# Patient Record
Sex: FEMALE | Race: WHITE | NOT HISPANIC OR LATINO | Employment: FULL TIME | URBAN - METROPOLITAN AREA
[De-identification: names, ages, dates, MRNs, and addresses within clinical notes are randomized per-mention and may not be internally consistent; named-entity substitution may affect disease eponyms.]

---

## 2017-01-02 RX ORDER — HYDROCHLOROTHIAZIDE 25 MG/1
TABLET ORAL
Qty: 90 TAB | Refills: 0 | Status: SHIPPED | OUTPATIENT
Start: 2017-01-02 | End: 2017-04-01 | Stop reason: SDUPTHER

## 2017-01-02 NOTE — TELEPHONE ENCOUNTER
Refill done. Patient is due for annual appointment. Please have patient schedule.  Pedrito Siddiqui M.D.

## 2017-01-31 ENCOUNTER — APPOINTMENT (OUTPATIENT)
Dept: MEDICAL GROUP | Facility: MEDICAL CENTER | Age: 32
End: 2017-01-31
Payer: COMMERCIAL

## 2017-02-09 ENCOUNTER — APPOINTMENT (OUTPATIENT)
Dept: MEDICAL GROUP | Facility: MEDICAL CENTER | Age: 32
End: 2017-02-09
Payer: COMMERCIAL

## 2017-02-14 ENCOUNTER — TELEPHONE (OUTPATIENT)
Dept: NEUROLOGY | Facility: MEDICAL CENTER | Age: 32
End: 2017-02-14

## 2017-02-14 RX ORDER — HYDROCODONE BITARTRATE AND ACETAMINOPHEN 5; 300 MG/1; MG/1
TABLET ORAL
Qty: 30 TAB | Refills: 0 | Status: SHIPPED
Start: 2017-02-14 | End: 2017-02-14 | Stop reason: SDUPTHER

## 2017-02-14 RX ORDER — HYDROCODONE BITARTRATE AND ACETAMINOPHEN 5; 300 MG/1; MG/1
TABLET ORAL
Qty: 30 TAB | Refills: 0 | Status: SHIPPED | OUTPATIENT
Start: 2017-02-14 | End: 2017-03-20

## 2017-02-14 RX ORDER — SUMATRIPTAN 100 MG/1
TABLET, FILM COATED ORAL
Qty: 9 TAB | Refills: 5 | Status: SHIPPED | OUTPATIENT
Start: 2017-02-14 | End: 2017-03-20

## 2017-02-14 NOTE — TELEPHONE ENCOUNTER
Pt is sending this message via [employer] e-mail. Please advise. Thank you.    Prescription Question  Message [id number]     From   Nataliia Merida    To   DAT Robels    Sent   2/12/2017 10:50 AM        Shanell Mckeon I was wondering if I can get another script for Vicodin? Also if I can get a script for imitrex? I went and tried to    the script for relpax and it was $400 for 6 pills with insurance.

## 2017-02-16 ENCOUNTER — PATIENT MESSAGE (OUTPATIENT)
Dept: MEDICAL GROUP | Facility: MEDICAL CENTER | Age: 32
End: 2017-02-16

## 2017-02-16 RX ORDER — FLUOXETINE HYDROCHLORIDE 20 MG/1
20 CAPSULE ORAL DAILY
Qty: 90 CAP | Refills: 0 | Status: SHIPPED | OUTPATIENT
Start: 2017-02-16 | End: 2017-06-28 | Stop reason: SDUPTHER

## 2017-03-10 ENCOUNTER — OFFICE VISIT (OUTPATIENT)
Dept: MEDICAL GROUP | Facility: PHYSICIAN GROUP | Age: 32
End: 2017-03-10
Payer: COMMERCIAL

## 2017-03-10 VITALS
SYSTOLIC BLOOD PRESSURE: 122 MMHG | DIASTOLIC BLOOD PRESSURE: 90 MMHG | OXYGEN SATURATION: 97 % | TEMPERATURE: 98.2 F | WEIGHT: 260.2 LBS | HEIGHT: 67 IN | BODY MASS INDEX: 40.84 KG/M2 | RESPIRATION RATE: 16 BRPM | HEART RATE: 86 BPM

## 2017-03-10 DIAGNOSIS — E66.01 MORBID OBESITY WITH BMI OF 40.0-44.9, ADULT (HCC): Chronic | ICD-10-CM

## 2017-03-10 DIAGNOSIS — F33.0 MDD (MAJOR DEPRESSIVE DISORDER), RECURRENT EPISODE, MILD (HCC): Chronic | ICD-10-CM

## 2017-03-10 DIAGNOSIS — Z13.89 SCREENING FOR CARDIOVASCULAR, RESPIRATORY, AND GENITOURINARY DISEASES: ICD-10-CM

## 2017-03-10 DIAGNOSIS — Z13.6 SCREENING FOR CARDIOVASCULAR, RESPIRATORY, AND GENITOURINARY DISEASES: ICD-10-CM

## 2017-03-10 DIAGNOSIS — F41.9 ANXIETY: ICD-10-CM

## 2017-03-10 DIAGNOSIS — I10 ESSENTIAL HYPERTENSION: Chronic | ICD-10-CM

## 2017-03-10 DIAGNOSIS — Z13.21 ENCOUNTER FOR VITAMIN DEFICIENCY SCREENING: ICD-10-CM

## 2017-03-10 DIAGNOSIS — Z13.83 SCREENING FOR CARDIOVASCULAR, RESPIRATORY, AND GENITOURINARY DISEASES: ICD-10-CM

## 2017-03-10 PROCEDURE — 99214 OFFICE O/P EST MOD 30 MIN: CPT | Performed by: NURSE PRACTITIONER

## 2017-03-10 ASSESSMENT — PATIENT HEALTH QUESTIONNAIRE - PHQ9: CLINICAL INTERPRETATION OF PHQ2 SCORE: 0

## 2017-03-11 NOTE — ASSESSMENT & PLAN NOTE
Patient reports that she has daily migraine headaches and is currently treated with Topamax and sumatriptan. She also reports that she has an ongoing daily headache other than her migraines which is a constant aching pain in the temples and forehead starting 6 years ago. Patient had epidural anesthesia for childbirth 6 years ago. Her headaches improved when she is laying supine and worsen when she is upright. She may benefit from a lumbar spinal tap to determine CSF pressure for possible low spinal fluid headache. She is given a discussed this with Dr. Du neurologist.

## 2017-03-11 NOTE — ASSESSMENT & PLAN NOTE
Patient is morbidly obese with a BMI of 40.74. She admits to poor diet and also drinks soda. We discussed food triggers may be contributing to her headaches and possible food she can eliminate from her diet.

## 2017-03-11 NOTE — ASSESSMENT & PLAN NOTE
Chronic condition: Patient is treated for chronic anxiety with Prozac and xanax as needed once or twice a month. Denies any recent panic attacks, suicidal or homicidal ideation. Anxiety increased because younger brother was recently diagnosed with Huntingtons disease

## 2017-03-11 NOTE — PROGRESS NOTES
Chief Complaint   Patient presents with   • Establish Care   • Medication Refill     Xanax        HPI:    Nataliia Merida is a 31 y.o. female here to establish care and to discuss the following:    Anxiety  Chronic condition: Patient is treated for chronic anxiety with Prozac and xanax as needed once or twice a month. Denies any recent panic attacks, suicidal or homicidal ideation. Anxiety increased because younger brother was recently diagnosed with Huntingtons disease        Chronic migraine  Patient reports that she has daily migraine headaches and is currently treated with Topamax and sumatriptan. She also reports that she has an ongoing daily headache other than her migraines which is a constant aching pain in the temples and forehead starting 6 years ago. Patient had epidural anesthesia for childbirth 6 years ago. Her headaches improved when she is laying supine and worsen when she is upright. She may benefit from a lumbar spinal tap to determine CSF pressure for possible low spinal fluid headache. She is given a discussed this with Dr. Du neurologist.    HTN (hypertension)  Chronic condition: Patient has been treated for hypertension for 4 years.She  is currently taking hctz.  She denies any chest pain, diaphoresis, shortness of breath, headaches, dizziness or blurred vision.     MDD (major depressive disorder), recurrent episode, mild  Chronic Condition: Patient is currently taking Prozac for ongoing depression without any side effects. She denies agitation, insomnia, hallucinations, delusions, feelings of worthlessness,  loss of interest, suicidal or homicidal ideation.     Morbid obesity with BMI of 40.0-44.9, adult (HCC)  Patient is morbidly obese with a BMI of 40.74. She admits to poor diet and also drinks soda. We discussed food triggers may be contributing to her headaches and possible food she can eliminate from her diet.        Current medicines (including changes today)  Current Outpatient  "Prescriptions   Medication Sig Dispense Refill   • fluoxetine (PROZAC) 20 MG Cap Take 1 Cap by mouth every day. 90 Cap 0   • sumatriptan (IMITREX) 100 MG tablet Take 1/2-1 tablet po at onset of headache, may repeat dose in 2 hours if unrelieved.  Do not exceed more than 2 tablets in 24 hours. 9 Tab 5   • Hydrocodone-Acetaminophen (VICODIN) 5-300 MG Tab Take one tablet po Q6 hours prn migraine.  Do not exceed more than 2 tablets in 24 hours. 30 Tab 0   • hydrochlorothiazide (HYDRODIURIL) 25 MG Tab TAKE ONE TABLET BY MOUTH DAILY 90 Tab 0   • eletriptan (RELPAX) 40 MG tablet Take 1/2-1 tablet po at onset of headache, may repeat dose in 2 hours if unrelieved.  Do not exceed more than 2 tablets in 24 hours. 9 Tab 2   • topiramate (TOPAMAX) 50 MG tablet Take one tablet qam and 1.5 tablets po qhs X 1 week then one tablet po qam and two tablets po qhs thereafter. 90 Tab 5   • alprazolam (XANAX) 0.25 MG Tab Take 1 Tab by mouth 1 time daily as needed for Anxiety. 20 Tab 0   • benzonatate (TESSALON) 100 MG Cap Take 1 Cap by mouth 3 times a day as needed for Cough. 30 Cap 0   • hydrocod polst-chlorphen polst (TUSSIONEX PENNKINETIC ER) 10-8 MG/5ML Liquid CR Take 5 mL by mouth every 12 hours. 60 mL 0   • hydrocodone-acetaminophen (NORCO) 5-325 MG Tab per tablet Take 1-2 Tabs by mouth every four hours as needed. 30 Tab 0   • levonorgestrel-ethinyl estradiol (SEASONALE) 0.15-0.03 MG per tablet Take 1 Tab by mouth every day. (Patient not taking: Reported on 10/12/2016) 84 Tab 3     No current facility-administered medications for this visit.     She  has a past medical history of Tension headache; Abnormal Pap smear; Depression (10/19/2009); and Hypertension.  She  has no past surgical history on file.  Social History   Substance Use Topics   • Smoking status: Never Smoker    • Smokeless tobacco: Never Used      Comment: \"sometimes\"   • Alcohol Use: No     Social History     Social History Narrative     History reviewed. No " "pertinent family history.  Family Status   Relation Status Death Age   • Mother Alive    • Father Alive          ROS  Review of Systems   Constitutional: Negative for fever, chills, weight loss and malaise/fatigue.   HENT: Negative for ear pain, nosebleeds, congestion, sore throat and neck pain.    Eyes: Negative for blurred vision.   Respiratory: Negative for cough, sputum production, shortness of breath and wheezing.    Cardiovascular: Negative for chest pain, palpitations,  and leg swelling.   Gastrointestinal: Negative for heartburn, nausea, vomiting, diarrhea and abdominal pain.   Genitourinary: Negative for dysuria, urgency and frequency.   Musculoskeletal: Negative for myalgias, back pain and joint pain.   Skin: Negative for rash and itching.   Neurological: Negative for dizziness, tingling, tremors, sensory change, focal weakness. Positive for headaches  Endo/Heme/Allergies: Does not bruise/bleed easily.   Psychiatric/Behavioral: Negative for depression, anxiety, suicidal ideas, insomnia and memory loss. Positive for depression and anxiety    All other systems reviewed and are negative except as in HPI.       Objective:     Blood pressure 122/90, pulse 86, temperature 36.8 °C (98.2 °F), resp. rate 16, height 1.702 m (5' 7.01\"), weight 118.026 kg (260 lb 3.2 oz), last menstrual period 02/24/2017, SpO2 97 %, not currently breastfeeding. Body mass index is 40.74 kg/(m^2).  Physical Exam:  Constitutional: Alert, no distress.  Skin: Warm, dry, good turgor, no rashes in visible areas.  Eye: Equal, round and reactive, conjunctiva clear, lids normal.  ENMT: Lips without lesions, good dentition, oropharynx clear. Ear canals are clear, TMs within normal limits bilaterally.   Neck: Trachea midline, no masses, no thyromegaly. No cervical or supraclavicular lymphadenopathy.  Respiratory: Unlabored respiratory effort, lungs clear to auscultation, no wheezes, no ronchi.  Cardiovascular: Normal S1, S2, no murmur, no " edema.  Abdomen: Morbidly obese Soft, non-tender, no masses, no hepatosplenomegaly.  Psych: Alert and oriented x3, normal affect and mood.  MS: Ambulates independently with steady gait. Has full range of motion of all extremities and spine.  Neuro:  Cranial nerves intact. DTRs within normal limits. No neurological deficits.  Head is smooth and symmetrical without lacerations, hemotympanum, mastoid bruising, raccoon eyes, otorrhea or rhinorrhea. There is no  scalp tenderness or temporal tenderness. Pupils are equal and reactive, and funduscopic exam is within normal limits, without papilledema. There is no lacrimation, or ptosis. There is full range of motion of the temporomandibular joint without crepitus or tenderness. Patient has full range of motion of neck without nuchal rigidity. Cranial nerves II through XII are intact. There are no cerebellar deficits as indicated by alternating hands, finger to nose, and steady gait    Assessment and Plan:   The following treatment plan was discussed   1. Anxiety      Continue current medication   2. Chronic migraine      Follow-up with neurology   3. Essential hypertension      Continue current medication   4. MDD (major depressive disorder), recurrent episode, mild (CMS-HCC)      Continue current medication   5. Morbid obesity with BMI of 40.0-44.9, adult (CMS-HCC)  Patient identified as having weight management issue.  Appropriate orders and counseling given.    Discussed diet   6. Screening for cardiovascular, respiratory, and genitourinary diseases  COMP METABOLIC PANEL    LIPID PROFILE    Labs ordered   7. Encounter for vitamin deficiency screening  VITAMIN D,25 HYDROXY    Labs ordered     Followup: Return in about 3 months (around 6/10/2017) for anxiety, depression.   Please note that this dictation was created using voice recognition software. I have made every reasonable attempt to correct obvious errors, but I expect that there are errors of grammar and possibly  content that I did not discover before finalizing the note.

## 2017-03-11 NOTE — ASSESSMENT & PLAN NOTE
Chronic condition: Patient has been treated for hypertension for 4 years.She  is currently taking hctz.  She denies any chest pain, diaphoresis, shortness of breath, headaches, dizziness or blurred vision.

## 2017-03-11 NOTE — ASSESSMENT & PLAN NOTE
Chronic Condition: Patient is currently taking Prozac for ongoing depression without any side effects. She denies agitation, insomnia, hallucinations, delusions, feelings of worthlessness,  loss of interest, suicidal or homicidal ideation.

## 2017-03-13 ENCOUNTER — TELEPHONE (OUTPATIENT)
Dept: NEUROLOGY | Facility: MEDICAL CENTER | Age: 32
End: 2017-03-13

## 2017-03-13 DIAGNOSIS — G44.89 OTHER HEADACHE SYNDROME: ICD-10-CM

## 2017-03-13 NOTE — TELEPHONE ENCOUNTER
Pt is sending this message via [employer] e-mail. Please advise. Thank you.    Non-Urgent Medical Question  Message [id number]     From   Nataliia Merida    To   DAT Robles    Sent   3/10/2017  5:58 PM        Shanell Mckeon,     So I saw Gely Barnett today as my new PCP. We were discussing my headaches. She had asked if had any kids and if I had any epidurals. I told her I did almost 6 years ago. Which is when my headaches started. She mentioned something called low CSF headaches. She said we should look at maybe look at getting a spinal tap ordered to check the pressure. So I was wondering what your thoughts were and if we could look at getting that ordered? Thank you!

## 2017-03-14 NOTE — TELEPHONE ENCOUNTER
"We can order a \"blood patch\" procedure which will hopefully provide relief for her.  The order is placed and she should be hearing from her eventual radiology to set up the appointment.  "

## 2017-03-14 NOTE — TELEPHONE ENCOUNTER
Non-Urgent Medical Question        From     Nataliia Merida      To     Neurology Mas      Sent     3/14/2017 10:31 AM            Cheng oro. So my headaches start from the moment I get up from bed until I go to bed. If I lay down, I am ok. They are constant. If I get up and down a lot, it makes them worse. The only thing that helps is the imitrex because it makes me sleepy and then I can lay down and go to sleep. But I can only take that when I am not working. I feel constant throbbing pressure when I am awake.

## 2017-03-15 ENCOUNTER — HOSPITAL ENCOUNTER (OUTPATIENT)
Dept: RADIOLOGY | Facility: MEDICAL CENTER | Age: 32
End: 2017-03-15
Attending: NURSE PRACTITIONER
Payer: COMMERCIAL

## 2017-03-15 ENCOUNTER — TELEPHONE (OUTPATIENT)
Dept: NEUROLOGY | Facility: MEDICAL CENTER | Age: 32
End: 2017-03-15

## 2017-03-15 DIAGNOSIS — R51.0 POSITIONAL HEADACHE: ICD-10-CM

## 2017-03-15 PROCEDURE — 72156 MRI NECK SPINE W/O & W/DYE: CPT

## 2017-03-15 PROCEDURE — 72157 MRI CHEST SPINE W/O & W/DYE: CPT

## 2017-03-15 PROCEDURE — A9579 GAD-BASE MR CONTRAST NOS,1ML: HCPCS | Performed by: NURSE PRACTITIONER

## 2017-03-15 PROCEDURE — 700117 HCHG RX CONTRAST REV CODE 255: Performed by: NURSE PRACTITIONER

## 2017-03-15 PROCEDURE — 72158 MRI LUMBAR SPINE W/O & W/DYE: CPT

## 2017-03-15 RX ADMIN — GADODIAMIDE 20 ML: 287 INJECTION INTRAVENOUS at 20:28

## 2017-03-15 NOTE — TELEPHONE ENCOUNTER
Message: patient would like to proceed with the spine MRI Stat and poss blood patch    Caller: yusra  Call Back #: [phone]  OK to leave detailed message: n

## 2017-03-16 ENCOUNTER — TELEPHONE (OUTPATIENT)
Dept: NEUROLOGY | Facility: MEDICAL CENTER | Age: 32
End: 2017-03-16

## 2017-03-16 NOTE — TELEPHONE ENCOUNTER
Pt is sending this message via [employer] e-mail. Please advise. Thank you.    Test Result Question  Message [id number]     From   Nataliia Merida    To   DAT Robles    Sent   3/16/2017  1:45 PM        I was wondering if my MRI results were in?

## 2017-03-20 ENCOUNTER — OFFICE VISIT (OUTPATIENT)
Dept: NEUROLOGY | Facility: MEDICAL CENTER | Age: 32
End: 2017-03-20
Payer: COMMERCIAL

## 2017-03-20 VITALS
HEART RATE: 81 BPM | WEIGHT: 255 LBS | HEIGHT: 67 IN | DIASTOLIC BLOOD PRESSURE: 74 MMHG | BODY MASS INDEX: 40.02 KG/M2 | RESPIRATION RATE: 16 BRPM | SYSTOLIC BLOOD PRESSURE: 110 MMHG | TEMPERATURE: 97.2 F | OXYGEN SATURATION: 95 %

## 2017-03-20 PROCEDURE — 99214 OFFICE O/P EST MOD 30 MIN: CPT | Performed by: NURSE PRACTITIONER

## 2017-03-20 RX ORDER — KETOROLAC TROMETHAMINE 10 MG/1
10 TABLET, FILM COATED ORAL EVERY 6 HOURS PRN
Qty: 30 TAB | Refills: 2 | Status: SHIPPED | OUTPATIENT
Start: 2017-03-20 | End: 2018-01-11

## 2017-03-20 RX ORDER — TOPIRAMATE 25 MG/1
100 CAPSULE, EXTENDED RELEASE ORAL
Qty: 120 EACH | Refills: 5 | Status: SHIPPED | OUTPATIENT
Start: 2017-03-20 | End: 2018-01-11

## 2017-03-20 ASSESSMENT — ENCOUNTER SYMPTOMS
SORE THROAT: 0
WEIGHT LOSS: 1
DOUBLE VISION: 0
ABDOMINAL PAIN: 0
DIARRHEA: 0
DIZZINESS: 0
DEPRESSION: 0
NERVOUS/ANXIOUS: 0
HEADACHES: 1
SEIZURES: 0
VOMITING: 0
NAUSEA: 0
COUGH: 0
MUSCULOSKELETAL NEGATIVE: 1

## 2017-03-20 NOTE — PROGRESS NOTES
"Subjective:      Nataliia Merida is a 31 y.o. female who presents with Follow-Up for Chronic Migraine.        HPI   Headaches are constant.  \"They are just annoying\".  She has been considering that something is wrong and is frustrated that the headaches persist.    She was just evaluated by the PCP.    Recent MRI's of spine:  1. Small right paracentral disc protrusions at the T6-7 and T7-8 levels.  2. No evidence of arachnoid diverticula.    She will awaken with most of her headaches and fall asleep with them.  The pain intensity waxes and wanes.  Does not notice a wax/wane pattern with her headaches.  Does not notice a menstrual quality.    Finds that she likes taking the imitrex but it will make her fall asleep.    Current Outpatient Prescriptions   Medication Sig Dispense Refill   • fluoxetine (PROZAC) 20 MG Cap Take 1 Cap by mouth every day. 90 Cap 0   • hydrochlorothiazide (HYDRODIURIL) 25 MG Tab TAKE ONE TABLET BY MOUTH DAILY 90 Tab 0   • topiramate (TOPAMAX) 50 MG tablet Take one tablet qam and 1.5 tablets po qhs X 1 week then one tablet po qam and two tablets po qhs thereafter. 90 Tab 5   • sumatriptan (IMITREX) 100 MG tablet Take 1/2-1 tablet po at onset of headache, may repeat dose in 2 hours if unrelieved.  Do not exceed more than 2 tablets in 24 hours. 9 Tab 5   • Hydrocodone-Acetaminophen (VICODIN) 5-300 MG Tab Take one tablet po Q6 hours prn migraine.  Do not exceed more than 2 tablets in 24 hours. (Patient not taking: Reported on 3/20/2017) 30 Tab 0   • eletriptan (RELPAX) 40 MG tablet Take 1/2-1 tablet po at onset of headache, may repeat dose in 2 hours if unrelieved.  Do not exceed more than 2 tablets in 24 hours. (Patient not taking: Reported on 3/20/2017) 9 Tab 2   • alprazolam (XANAX) 0.25 MG Tab Take 1 Tab by mouth 1 time daily as needed for Anxiety. 20 Tab 0   • benzonatate (TESSALON) 100 MG Cap Take 1 Cap by mouth 3 times a day as needed for Cough. (Patient not taking: Reported on " "3/20/2017) 30 Cap 0   • hydrocod polst-chlorphen polst (TUSSIONEX PENNKINETIC ER) 10-8 MG/5ML Liquid CR Take 5 mL by mouth every 12 hours. (Patient not taking: Reported on 3/20/2017) 60 mL 0   • hydrocodone-acetaminophen (NORCO) 5-325 MG Tab per tablet Take 1-2 Tabs by mouth every four hours as needed. 30 Tab 0   • levonorgestrel-ethinyl estradiol (SEASONALE) 0.15-0.03 MG per tablet Take 1 Tab by mouth every day. (Patient not taking: Reported on 10/12/2016) 84 Tab 3     No current facility-administered medications for this visit.         Review of Systems   Constitutional: Positive for weight loss.   HENT: Negative for hearing loss, nosebleeds and sore throat.         No recent head injury.   Eyes: Negative for double vision.        No new loss of vision.   Respiratory: Negative for cough.         No recent lung infections.   Cardiovascular: Negative for chest pain.   Gastrointestinal: Negative for nausea, vomiting, abdominal pain and diarrhea.   Genitourinary: Negative.    Musculoskeletal: Negative.    Skin: Negative.    Neurological: Positive for headaches. Negative for dizziness and seizures.   Endo/Heme/Allergies:        No history of endocrine dysfunction.  No new problems.   Psychiatric/Behavioral: Negative for depression. The patient is not nervous/anxious.         No recent mood changes.          Objective:     /74 mmHg  Pulse 81  Temp(Src) 36.2 °C (97.2 °F)  Resp 16  Ht 1.702 m (5' 7.01\")  Wt 115.667 kg (255 lb)  BMI 39.93 kg/m2  SpO2 95%  LMP 02/24/2017     Physical Exam   Constitutional: She is oriented to person, place, and time. She appears well-developed and well-nourished.   HENT:   Head: Normocephalic and atraumatic.   Eyes: EOM are normal.   Neck: Normal range of motion.   Cardiovascular: Normal rate and regular rhythm.    Pulmonary/Chest: Effort normal.   Neurological: She is alert and oriented to person, place, and time. She exhibits normal muscle tone. Gait normal.   No observable " changes in neurologic status.  See initial new patient examination for details.    Skin: Skin is warm.   Psychiatric: She has a normal mood and affect.   Flat affect           Assessment/Plan:     Chronic Migraine:  Headaches have not been well managed.  She is complaining of nearly a daily headache and will awaken with many of them.    She has many abortive prescription options available however prefers to use Exedrin migraine.  Continue to use Maxalt MLT's but does feel sleepy when taking it.    Counseled regarding anxiety and depression and activity level.  I would recommend a sleep study in the future.    Obtain labs as ordered.    Taper off of HTCZ-- she has a home BP monitoring kit.    Change TXP 50mg qhs to Qudexy 25mg capsules with titration to 100mg qhs ideally.    Encouraged to be active, exercise, eat healthy and routinely.  Recommend starting magnesium everyday.    We reviewed her spine MRI's at length.  It is decided at this time to not proceed with a blood patch.    Return for follow-up in 3 months.   I spent 35+ minutes with this patient, over fifty percent was spent counseling patient on their condition, best management practices, reviewing test results and risks and benefits of treatment.

## 2017-03-20 NOTE — PATIENT INSTRUCTIONS
Qudexy 25mg tablets:      Bedtime:  2 tablets X 1 week  3 tablets X 1 week  4 tablets therafter    FINDINGS:  The thoracic vertebral bodies have a normal height and alignment. The intervertebral disc have moderate narrowing at the T6-7 and T7-8 levelsa. The thoracic cord has a normal caliber course and signal intensity. There is no evidence of abnormal   enhancement in the thoracic spinal cord.    There is no evidence of central canal stenosis, or neural foraminal stenosis. There is small right paracentral disc protrusions at the T6-7 and more prominently at the T7-8 level.         Impression          1. Small right paracentral disc protrusions at the T6-7 and T7-8 levels.    2. No evidence of arachnoid diverticula.

## 2017-04-29 ENCOUNTER — HOSPITAL ENCOUNTER (OUTPATIENT)
Dept: LAB | Facility: MEDICAL CENTER | Age: 32
End: 2017-04-29
Attending: NURSE PRACTITIONER
Payer: COMMERCIAL

## 2017-04-29 DIAGNOSIS — Z13.21 ENCOUNTER FOR VITAMIN DEFICIENCY SCREENING: ICD-10-CM

## 2017-04-29 DIAGNOSIS — Z13.83 SCREENING FOR CARDIOVASCULAR, RESPIRATORY, AND GENITOURINARY DISEASES: ICD-10-CM

## 2017-04-29 DIAGNOSIS — Z13.89 SCREENING FOR CARDIOVASCULAR, RESPIRATORY, AND GENITOURINARY DISEASES: ICD-10-CM

## 2017-04-29 DIAGNOSIS — Z13.6 SCREENING FOR CARDIOVASCULAR, RESPIRATORY, AND GENITOURINARY DISEASES: ICD-10-CM

## 2017-04-29 LAB
25(OH)D3 SERPL-MCNC: 10 NG/ML (ref 30–100)
25(OH)D3 SERPL-MCNC: 11 NG/ML (ref 30–100)
ALBUMIN SERPL BCP-MCNC: 4.1 G/DL (ref 3.2–4.9)
ALBUMIN SERPL BCP-MCNC: 4.1 G/DL (ref 3.2–4.9)
ALBUMIN/GLOB SERPL: 1.2 G/DL
ALBUMIN/GLOB SERPL: 1.2 G/DL
ALP SERPL-CCNC: 114 U/L (ref 30–99)
ALP SERPL-CCNC: 114 U/L (ref 30–99)
ALT SERPL-CCNC: 15 U/L (ref 2–50)
ALT SERPL-CCNC: 16 U/L (ref 2–50)
ANION GAP SERPL CALC-SCNC: 8 MMOL/L (ref 0–11.9)
ANION GAP SERPL CALC-SCNC: 9 MMOL/L (ref 0–11.9)
AST SERPL-CCNC: 13 U/L (ref 12–45)
AST SERPL-CCNC: 14 U/L (ref 12–45)
BASOPHILS # BLD AUTO: 0.7 % (ref 0–1.8)
BASOPHILS # BLD: 0.05 K/UL (ref 0–0.12)
BILIRUB SERPL-MCNC: 0.5 MG/DL (ref 0.1–1.5)
BILIRUB SERPL-MCNC: 0.5 MG/DL (ref 0.1–1.5)
BUN SERPL-MCNC: 15 MG/DL (ref 8–22)
BUN SERPL-MCNC: 15 MG/DL (ref 8–22)
CALCIUM SERPL-MCNC: 9 MG/DL (ref 8.5–10.5)
CALCIUM SERPL-MCNC: 9 MG/DL (ref 8.5–10.5)
CHLORIDE SERPL-SCNC: 109 MMOL/L (ref 96–112)
CHLORIDE SERPL-SCNC: 110 MMOL/L (ref 96–112)
CHOLEST SERPL-MCNC: 185 MG/DL (ref 100–199)
CO2 SERPL-SCNC: 20 MMOL/L (ref 20–33)
CO2 SERPL-SCNC: 20 MMOL/L (ref 20–33)
CREAT SERPL-MCNC: 0.71 MG/DL (ref 0.5–1.4)
CREAT SERPL-MCNC: 0.72 MG/DL (ref 0.5–1.4)
EOSINOPHIL # BLD AUTO: 0.05 K/UL (ref 0–0.51)
EOSINOPHIL NFR BLD: 0.7 % (ref 0–6.9)
ERYTHROCYTE [DISTWIDTH] IN BLOOD BY AUTOMATED COUNT: 44.8 FL (ref 35.9–50)
GFR SERPL CREATININE-BSD FRML MDRD: >60 ML/MIN/1.73 M 2
GFR SERPL CREATININE-BSD FRML MDRD: >60 ML/MIN/1.73 M 2
GLOBULIN SER CALC-MCNC: 3.3 G/DL (ref 1.9–3.5)
GLOBULIN SER CALC-MCNC: 3.4 G/DL (ref 1.9–3.5)
GLUCOSE SERPL-MCNC: 92 MG/DL (ref 65–99)
GLUCOSE SERPL-MCNC: 93 MG/DL (ref 65–99)
HCT VFR BLD AUTO: 44.7 % (ref 37–47)
HDLC SERPL-MCNC: 47 MG/DL
HGB BLD-MCNC: 14.2 G/DL (ref 12–16)
IMM GRANULOCYTES # BLD AUTO: 0.02 K/UL (ref 0–0.11)
IMM GRANULOCYTES NFR BLD AUTO: 0.3 % (ref 0–0.9)
LDLC SERPL CALC-MCNC: 118 MG/DL
LYMPHOCYTES # BLD AUTO: 2.62 K/UL (ref 1–4.8)
LYMPHOCYTES NFR BLD: 36.8 % (ref 22–41)
MCH RBC QN AUTO: 28.4 PG (ref 27–33)
MCHC RBC AUTO-ENTMCNC: 31.8 G/DL (ref 33.6–35)
MCV RBC AUTO: 89.4 FL (ref 81.4–97.8)
MONOCYTES # BLD AUTO: 0.37 K/UL (ref 0–0.85)
MONOCYTES NFR BLD AUTO: 5.2 % (ref 0–13.4)
NEUTROPHILS # BLD AUTO: 4 K/UL (ref 2–7.15)
NEUTROPHILS NFR BLD: 56.3 % (ref 44–72)
NRBC # BLD AUTO: 0 K/UL
NRBC BLD AUTO-RTO: 0 /100 WBC
PLATELET # BLD AUTO: 310 K/UL (ref 164–446)
PMV BLD AUTO: 11.5 FL (ref 9–12.9)
POTASSIUM SERPL-SCNC: 3.8 MMOL/L (ref 3.6–5.5)
POTASSIUM SERPL-SCNC: 3.8 MMOL/L (ref 3.6–5.5)
PROT SERPL-MCNC: 7.4 G/DL (ref 6–8.2)
PROT SERPL-MCNC: 7.5 G/DL (ref 6–8.2)
RBC # BLD AUTO: 5 M/UL (ref 4.2–5.4)
SODIUM SERPL-SCNC: 138 MMOL/L (ref 135–145)
SODIUM SERPL-SCNC: 138 MMOL/L (ref 135–145)
T4 FREE SERPL-MCNC: 0.65 NG/DL (ref 0.53–1.43)
TRIGL SERPL-MCNC: 101 MG/DL (ref 0–149)
TSH SERPL DL<=0.005 MIU/L-ACNC: 1.36 UIU/ML (ref 0.3–3.7)
VIT B12 SERPL-MCNC: 400 PG/ML (ref 211–911)
WBC # BLD AUTO: 7.1 K/UL (ref 4.8–10.8)

## 2017-04-29 PROCEDURE — 82306 VITAMIN D 25 HYDROXY: CPT | Mod: 91

## 2017-04-29 PROCEDURE — 80053 COMPREHEN METABOLIC PANEL: CPT

## 2017-04-29 PROCEDURE — 80053 COMPREHEN METABOLIC PANEL: CPT | Mod: 91

## 2017-04-29 PROCEDURE — 84443 ASSAY THYROID STIM HORMONE: CPT

## 2017-04-29 PROCEDURE — 82607 VITAMIN B-12: CPT

## 2017-04-29 PROCEDURE — 85025 COMPLETE CBC W/AUTO DIFF WBC: CPT

## 2017-04-29 PROCEDURE — 82306 VITAMIN D 25 HYDROXY: CPT

## 2017-04-29 PROCEDURE — 80061 LIPID PANEL: CPT

## 2017-04-29 PROCEDURE — 84439 ASSAY OF FREE THYROXINE: CPT

## 2017-04-29 PROCEDURE — 36415 COLL VENOUS BLD VENIPUNCTURE: CPT

## 2017-05-01 ENCOUNTER — TELEPHONE (OUTPATIENT)
Dept: NEUROLOGY | Facility: MEDICAL CENTER | Age: 32
End: 2017-05-01

## 2017-05-01 NOTE — TELEPHONE ENCOUNTER
Please let Nataliia know that out of all her labs the only concern is a low Vit D level-- needs to be taking at least Vit D3 2000IU per day.

## 2017-05-03 DIAGNOSIS — E55.9 VITAMIN D DEFICIENCY: ICD-10-CM

## 2017-05-03 RX ORDER — ERGOCALCIFEROL 1.25 MG/1
50000 CAPSULE ORAL
Qty: 30 CAP | Refills: 0 | Status: SHIPPED | OUTPATIENT
Start: 2017-05-03 | End: 2018-01-11

## 2017-06-26 RX ORDER — FLUOXETINE HYDROCHLORIDE 20 MG/1
20 CAPSULE ORAL DAILY
Qty: 90 CAP | Refills: 0 | Status: CANCELLED | OUTPATIENT
Start: 2017-06-26

## 2017-06-28 DIAGNOSIS — F33.0 MDD (MAJOR DEPRESSIVE DISORDER), RECURRENT EPISODE, MILD (HCC): ICD-10-CM

## 2017-06-28 RX ORDER — FLUOXETINE HYDROCHLORIDE 20 MG/1
20 CAPSULE ORAL DAILY
Qty: 90 CAP | Refills: 0 | Status: SHIPPED | OUTPATIENT
Start: 2017-06-28 | End: 2017-09-27 | Stop reason: SDUPTHER

## 2017-07-12 ENCOUNTER — TELEPHONE (OUTPATIENT)
Dept: NEUROLOGY | Facility: MEDICAL CENTER | Age: 32
End: 2017-07-12

## 2017-07-12 RX ORDER — TOPIRAMATE 50 MG/1
50 CAPSULE, EXTENDED RELEASE ORAL EVERY MORNING
Qty: 90 EACH | Refills: 5 | Status: SHIPPED | OUTPATIENT
Start: 2017-07-12 | End: 2017-07-31 | Stop reason: SDUPTHER

## 2017-07-12 NOTE — TELEPHONE ENCOUNTER
Patient is currently taking 50mg in the morning and 100mg at night. She is willing to try the Quedexy, she reports that last time even with a discount card it was still $300, as the discount card only covered $100 of the medication.

## 2017-07-12 NOTE — TELEPHONE ENCOUNTER
Please let Nataliia know that at the pharmacy they need to run the Quedexy generic and name brand.  One will be preferred over the other meaning have a low to none co-pay.

## 2017-07-12 NOTE — TELEPHONE ENCOUNTER
Can you please call to see what her current dose of TPX is so I can prescribe the same dose in Quedexy?

## 2017-07-17 ENCOUNTER — TELEPHONE (OUTPATIENT)
Dept: NEUROLOGY | Facility: MEDICAL CENTER | Age: 32
End: 2017-07-17

## 2017-07-17 DIAGNOSIS — F33.0 MDD (MAJOR DEPRESSIVE DISORDER), RECURRENT EPISODE, MILD (HCC): ICD-10-CM

## 2017-07-17 RX ORDER — ALPRAZOLAM 0.25 MG/1
TABLET ORAL
Qty: 20 TAB | Refills: 0 | Status: SHIPPED | OUTPATIENT
Start: 2017-07-17 | End: 2017-12-05 | Stop reason: SDUPTHER

## 2017-07-17 NOTE — TELEPHONE ENCOUNTER
Patient's pharmacy is asking for:    Alprazolam 0.25mg Tab  Sig: Take one tablet po PRN for anxiety        Was the patient seen in the last year in this department? Yes  3/20/17    Does patient have an active prescription for medications requested? Yes     Received Request Via: Pharmacy    No follow up appointment at this time.

## 2017-07-31 ENCOUNTER — TELEPHONE (OUTPATIENT)
Dept: NEUROLOGY | Facility: MEDICAL CENTER | Age: 32
End: 2017-07-31

## 2017-07-31 RX ORDER — TOPIRAMATE 50 MG/1
50 CAPSULE, EXTENDED RELEASE ORAL EVERY MORNING
Qty: 90 EACH | Refills: 5 | Status: SHIPPED
Start: 2017-07-31 | End: 2018-07-02

## 2017-07-31 NOTE — TELEPHONE ENCOUNTER
Called and spoke with patient to let her know that [employer] was trying to reach out to her about patient assistance for her medication. She stated that she will call them in the morning. Could you please reprint a script for topiramate Er so that I can send it into their pharmacy?

## 2017-08-16 ENCOUNTER — HOSPITAL ENCOUNTER (OUTPATIENT)
Facility: MEDICAL CENTER | Age: 32
End: 2017-08-16
Payer: COMMERCIAL

## 2017-08-16 LAB
BDY FAT % MEASURED: 45.4 %
BP DIAS: 88 MMHG
BP SYS: 132 MMHG
CHOLEST SERPL-MCNC: 185 MG/DL (ref 100–199)
DIABETES HTDIA: NO
EVENT NAME HTEVT: NORMAL
FASTING HTFAS: YES
GLUCOSE SERPL-MCNC: 88 MG/DL (ref 65–99)
HDLC SERPL-MCNC: 44 MG/DL
HYPERTENSION HTHYP: YES
LDLC SERPL CALC-MCNC: 125 MG/DL
SCREENING LOC CITY HTCIT: NORMAL
SCREENING LOC STATE HTSTA: NORMAL
SCREENING LOCATION HTLOC: NORMAL
SMOKING HTSMO: NO
SUBSCRIBER ID HTSID: NORMAL
TRIGL SERPL-MCNC: 79 MG/DL (ref 0–149)

## 2017-08-16 PROCEDURE — 82947 ASSAY GLUCOSE BLOOD QUANT: CPT

## 2017-08-16 PROCEDURE — 80061 LIPID PANEL: CPT

## 2017-08-16 PROCEDURE — S5190 WELLNESS ASSESSMENT BY NONPH: HCPCS

## 2017-09-27 DIAGNOSIS — F33.0 MDD (MAJOR DEPRESSIVE DISORDER), RECURRENT EPISODE, MILD (HCC): ICD-10-CM

## 2017-09-28 RX ORDER — FLUOXETINE HYDROCHLORIDE 20 MG/1
CAPSULE ORAL
Qty: 90 CAP | Refills: 0 | Status: SHIPPED | OUTPATIENT
Start: 2017-09-28 | End: 2017-09-29 | Stop reason: SDUPTHER

## 2017-09-29 ENCOUNTER — OFFICE VISIT (OUTPATIENT)
Dept: MEDICAL GROUP | Facility: PHYSICIAN GROUP | Age: 32
End: 2017-09-29
Payer: COMMERCIAL

## 2017-09-29 VITALS
WEIGHT: 255 LBS | OXYGEN SATURATION: 98 % | HEIGHT: 67 IN | TEMPERATURE: 98.1 F | DIASTOLIC BLOOD PRESSURE: 86 MMHG | HEART RATE: 88 BPM | BODY MASS INDEX: 40.02 KG/M2 | SYSTOLIC BLOOD PRESSURE: 122 MMHG | RESPIRATION RATE: 14 BRPM

## 2017-09-29 DIAGNOSIS — G43.911 INTRACTABLE MIGRAINE WITH STATUS MIGRAINOSUS, UNSPECIFIED MIGRAINE TYPE: ICD-10-CM

## 2017-09-29 DIAGNOSIS — F33.0 MDD (MAJOR DEPRESSIVE DISORDER), RECURRENT EPISODE, MILD (HCC): ICD-10-CM

## 2017-09-29 DIAGNOSIS — Z23 NEED FOR VACCINATION: ICD-10-CM

## 2017-09-29 DIAGNOSIS — F41.9 ANXIETY: ICD-10-CM

## 2017-09-29 PROCEDURE — 99214 OFFICE O/P EST MOD 30 MIN: CPT | Mod: 25 | Performed by: NURSE PRACTITIONER

## 2017-09-29 PROCEDURE — 90471 IMMUNIZATION ADMIN: CPT | Performed by: NURSE PRACTITIONER

## 2017-09-29 PROCEDURE — 90686 IIV4 VACC NO PRSV 0.5 ML IM: CPT | Performed by: NURSE PRACTITIONER

## 2017-09-29 RX ORDER — FLUOXETINE HYDROCHLORIDE 20 MG/1
20 CAPSULE ORAL DAILY
Qty: 90 CAP | Refills: 0 | Status: SHIPPED | OUTPATIENT
Start: 2017-09-29 | End: 2018-01-02

## 2017-10-06 NOTE — ASSESSMENT & PLAN NOTE
Chronic condition: Patient is treated for chronic anxiety with Prozac and Xanax as needed once or twice a month She denies any recent panic attacks, suicidal or homicidal ideation. She is requesting refill on Prozac today

## 2017-10-06 NOTE — PROGRESS NOTES
Subjective:     Chief Complaint   Patient presents with   • Medication Management     Prozac    • Immunizations     Flu        HPI:  Nataliia Merida is a 32 y.o. female here today to discuss the following:    Anxiety  Chronic condition: Patient is treated for chronic anxiety with Prozac and Xanax as needed once or twice a month She denies any recent panic attacks, suicidal or homicidal ideation. She is requesting refill on Prozac today    MDD (major depressive disorder), recurrent episode, mild  Depression is currently controlled with Prozac.    Chronic migraine  Migraine headaches are currently controlled with Topamax and managed by neurology         Current medicines (including changes today)  Current Outpatient Prescriptions   Medication Sig Dispense Refill   • fluoxetine (PROZAC) 20 MG Cap Take 1 Cap by mouth every day. 90 Cap 0   • vitamin D, Ergocalciferol, (DRISDOL) 14431 UNITS Cap capsule Take 1 Cap by mouth every 7 days. 30 Cap 0   • topiramate (TOPAMAX) 50 MG tablet Take one tablet qam and 1.5 tablets po qhs X 1 week then one tablet po qam and two tablets po qhs thereafter. 90 Tab 5   • Topiramate ER (QUDEXY XR) 50 MG Capsule ER 24 Hour Sprinkle Take 50 mg by mouth every morning. Take 100 mg by mouth every night. 90 Each 5   • alprazolam (XANAX) 0.25 MG Tab Take one tablet po Q6 hours prn anxiety. 20 Tab 0   • Topiramate ER (QUDEXY XR) 25 MG Capsule ER 24 Hour Sprinkle Take 100 mg by mouth every bedtime. 120 Each 5   • ketorolac (TORADOL) 10 MG Tab Take 1 Tab by mouth every 6 hours as needed. 30 Tab 2     No current facility-administered medications for this visit.        She  has a past medical history of Abnormal Pap smear; Depression (10/19/2009); Hypertension; and Tension headache.    ROS   Review of Systems   Constitutional: Negative for fever, chills, weight loss and malaise/fatigue.   HENT: Negative for ear pain, nosebleeds, congestion, sore throat and neck pain.    Respiratory: Negative for  "cough, sputum production, shortness of breath and wheezing.    Cardiovascular: Negative for chest pain, palpitations,  and leg swelling.   Gastrointestinal: Negative for heartburn, nausea, vomiting, diarrhea and abdominal pain.   Neurological: Negative for dizziness, tingling, tremors, sensory change, focal weakness and Positive for headaches.   Psychiatric/Behavioral: Positive for depression And anxiety. Negative for suicidal ideas, insomnia and memory loss.    All other systems reviewed and are negative except as in HPI.     Objective:   Physical Exam:  Blood pressure 122/86, pulse 88, temperature 36.7 °C (98.1 °F), resp. rate 14, height 1.702 m (5' 7.01\"), weight 115.7 kg (255 lb), last menstrual period 09/15/2017, SpO2 98 %, not currently breastfeeding. Body mass index is 39.93 kg/m².   Physical Exam:  Alert, oriented in no acute distress.  Eye contact is good, speech goal directed, affect calm  HEENT: conjunctiva non-injected, sclera non-icteric.  Pinna normal.   Neck No adenopathy or masses in the neck or supraclavicular regions.  Lungs: clear to auscultation bilaterally with good excursion.  CV: regular rate and rhythm.   Ext: no edema, color normal, vascularity normal, temperature normal  MS: Normal gait and station    Assessment and Plan:   The following treatment plan was discussed   1. Need for vaccination  Flu Quad Inj >3 Year Pre-Filled (Preservative Free)   2. MDD (major depressive disorder), recurrent episode, mild (CMS-HCC)  fluoxetine (PROZAC) 20 MG Cap   3. Intractable migraine with status migrainosus, unspecified migraine type     4. Anxiety     5. Chronic migraine       I have placed the below orders and discussed them with an approved delegating provider. The MA is performing the below orders under the direction of Dr. Dave, who have provided verbal consent for supervision.  Followup: Return in about 6 months (around 3/29/2018), or if symptoms worsen or fail to improve, for depression, anxiety. "   Please note that this dictation was created using voice recognition software. I have made every reasonable attempt to correct obvious errors, but I expect that there are errors of grammar and possibly content that I did not discover before finalizing the note.

## 2017-12-05 DIAGNOSIS — F33.0 MDD (MAJOR DEPRESSIVE DISORDER), RECURRENT EPISODE, MILD (HCC): ICD-10-CM

## 2017-12-05 RX ORDER — ALPRAZOLAM 0.25 MG/1
TABLET ORAL
Qty: 20 TAB | Refills: 0 | Status: SHIPPED
Start: 2017-12-05 | End: 2018-01-17 | Stop reason: SDUPTHER

## 2017-12-05 NOTE — TELEPHONE ENCOUNTER
Was the patient seen in the last year in this department? Yes  3/20/17    Does patient have an active prescription for medications requested? Yes     Received Request Via: Pharmacy    No scheduled follow up appointment at this time.

## 2017-12-31 DIAGNOSIS — F33.0 MDD (MAJOR DEPRESSIVE DISORDER), RECURRENT EPISODE, MILD (HCC): ICD-10-CM

## 2018-01-02 RX ORDER — FLUOXETINE HYDROCHLORIDE 20 MG/1
CAPSULE ORAL
Qty: 90 CAP | Refills: 1 | Status: SHIPPED | OUTPATIENT
Start: 2018-01-02 | End: 2018-07-02 | Stop reason: SDUPTHER

## 2018-01-02 NOTE — TELEPHONE ENCOUNTER
Was the patient seen in the last year in this department? Yes     Does patient have an active prescription for medications requested? No     Received Request Via: Pharmacy      Pt met protocol?: Yes, OV 9/17

## 2018-01-11 ENCOUNTER — OFFICE VISIT (OUTPATIENT)
Dept: NEUROLOGY | Facility: MEDICAL CENTER | Age: 33
End: 2018-01-11
Payer: COMMERCIAL

## 2018-01-11 VITALS
HEART RATE: 80 BPM | OXYGEN SATURATION: 98 % | DIASTOLIC BLOOD PRESSURE: 70 MMHG | SYSTOLIC BLOOD PRESSURE: 118 MMHG | HEIGHT: 67 IN | RESPIRATION RATE: 16 BRPM | TEMPERATURE: 98.6 F

## 2018-01-11 DIAGNOSIS — F41.9 ANXIETY: ICD-10-CM

## 2018-01-11 PROCEDURE — 99214 OFFICE O/P EST MOD 30 MIN: CPT | Performed by: NURSE PRACTITIONER

## 2018-01-11 RX ORDER — ZOLMITRIPTAN 5 MG/1
TABLET, ORALLY DISINTEGRATING ORAL
Qty: 9 TAB | Refills: 5 | Status: SHIPPED | OUTPATIENT
Start: 2018-01-11 | End: 2021-01-07

## 2018-01-11 ASSESSMENT — ENCOUNTER SYMPTOMS
VOMITING: 0
SORE THROAT: 0
MUSCULOSKELETAL NEGATIVE: 1
DEPRESSION: 0
DOUBLE VISION: 0
ABDOMINAL PAIN: 0
HEADACHES: 1
NERVOUS/ANXIOUS: 1
DIARRHEA: 0
CONSTITUTIONAL NEGATIVE: 1
NAUSEA: 0
SEIZURES: 0
COUGH: 0

## 2018-01-11 NOTE — PROGRESS NOTES
Subjective:      Nataliia Merida is a 32 y.o. female who presents with Follow-Up (chronic migraine)            HPI  Seen today for migraine management.    Wakes-up with a migraine almost every day.  If she wakes-up in the early morning hour, then her migraine will be really bad when she finally awakens 1-2 hours later.  Sometimes she is needing to stay home and start work a few hours later.  Other times she is calling in to work the entire day.    May not drink as much water as she should.    Abortive treatment is imitrex but is not very effective.    Did have a normal at-home sleep study in 2014.    Emotionally she struggles with anxiety but feels that it is controlled at this time.    Current Outpatient Prescriptions   Medication Sig Dispense Refill   • fluoxetine (PROZAC) 20 MG Cap TAKE ONE CAPSULE BY MOUTH DAILY 90 Cap 1   • Topiramate ER (QUDEXY XR) 50 MG Capsule ER 24 Hour Sprinkle Take 50 mg by mouth every morning. Take 100 mg by mouth every night. 90 Each 5   • topiramate (TOPAMAX) 50 MG tablet Take one tablet qam and 1.5 tablets po qhs X 1 week then one tablet po qam and two tablets po qhs thereafter. 90 Tab 5   • alprazolam (XANAX) 0.25 MG Tab Take one tablet po Q6 hours prn anxiety. 20 Tab 0   • vitamin D, Ergocalciferol, (DRISDOL) 01105 UNITS Cap capsule Take 1 Cap by mouth every 7 days. (Patient not taking: Reported on 1/11/2018) 30 Cap 0   • Topiramate ER (QUDEXY XR) 25 MG Capsule ER 24 Hour Sprinkle Take 100 mg by mouth every bedtime. (Patient not taking: Reported on 1/11/2018) 120 Each 5   • ketorolac (TORADOL) 10 MG Tab Take 1 Tab by mouth every 6 hours as needed. 30 Tab 2     No current facility-administered medications for this visit.          Review of Systems   Constitutional: Negative.    HENT: Negative for hearing loss, nosebleeds and sore throat.         No recent head injury.   Eyes: Negative for double vision.        No new loss of vision.   Respiratory: Negative for cough.         No  "recent lung infections.   Cardiovascular: Negative for chest pain.   Gastrointestinal: Negative for abdominal pain, diarrhea, nausea and vomiting.   Genitourinary: Negative.    Musculoskeletal: Negative.    Skin: Negative.    Neurological: Positive for headaches. Negative for seizures.   Endo/Heme/Allergies:        No history of endocrine dysfunction.  No new problems.   Psychiatric/Behavioral: Negative for depression. The patient is nervous/anxious.         No recent mood changes.          Objective:     /70 Comment: 118/70  Pulse 80   Temp 37 °C (98.6 °F)   Resp 16   Ht 1.702 m (5' 7\")   SpO2 98%      Physical Exam   Constitutional: She is oriented to person, place, and time. She appears well-developed and well-nourished.   HENT:   Head: Normocephalic and atraumatic.   Eyes: EOM are normal.   Neck: Normal range of motion.   Cardiovascular: Normal rate and regular rhythm.    Pulmonary/Chest: Effort normal.   Neurological: She is alert and oriented to person, place, and time. She exhibits normal muscle tone. Gait normal.   No observable changes in neurologic status.  See initial new patient examination for details.    Skin: Skin is warm.   Psychiatric: She has a normal mood and affect.               Assessment/Plan:     Chronic Migraine:  Headaches have not been well managed.  She is complaining of nearly a daily headache and will awaken with many of them.     Counseled regarding anxiety and depression and activity level.  I would recommend a sleep study in the future, once again referral is not placed.     Obtain labs as ordered.     Continue Qudexy 50-100mg.    Encouraged to be active, exercise, eat healthy and routinely.  Recommend starting magnesium everyday.  Needs to drink more water!     Return for follow-up in 6 months.  Consider CGRP-modulator in the near future.   I spent 35+ minutes with this patient, over fifty percent was spent counseling patient on their condition, best management practices, " reviewing test results and risks and benefits of treatment.

## 2018-01-11 NOTE — LETTER
January 11, 2018        Nataliia Merida  [address] [place] [address]        To whom this may concern:    Ms Merida is followed by neurology for chronic intractable migraines.  She often needs to rest when she has a migraine.  We met this morning to discuss plan of care and she is very proactive with her health care.  If possible, please allow for schedule change to enhance her health and well-being.    If you have any questions or concerns, please don't hesitate to call.        Sincerely,        APURVA RoblesPASHLEY.    Electronically Signed

## 2018-02-01 DIAGNOSIS — F33.0 MDD (MAJOR DEPRESSIVE DISORDER), RECURRENT EPISODE, MILD (HCC): ICD-10-CM

## 2018-02-01 RX ORDER — ALPRAZOLAM 0.25 MG/1
TABLET ORAL
Qty: 20 TAB | Refills: 0 | Status: SHIPPED
Start: 2018-02-01 | End: 2018-05-01 | Stop reason: SDUPTHER

## 2018-02-06 DIAGNOSIS — J34.9 SINUS DISEASE: ICD-10-CM

## 2018-04-09 ENCOUNTER — OFFICE VISIT (OUTPATIENT)
Dept: URGENT CARE | Facility: PHYSICIAN GROUP | Age: 33
End: 2018-04-09
Payer: COMMERCIAL

## 2018-04-09 VITALS
OXYGEN SATURATION: 97 % | DIASTOLIC BLOOD PRESSURE: 86 MMHG | RESPIRATION RATE: 18 BRPM | BODY MASS INDEX: 38.51 KG/M2 | TEMPERATURE: 97.8 F | WEIGHT: 260 LBS | HEART RATE: 89 BPM | SYSTOLIC BLOOD PRESSURE: 112 MMHG | HEIGHT: 69 IN

## 2018-04-09 DIAGNOSIS — J06.9 UPPER RESPIRATORY TRACT INFECTION, UNSPECIFIED TYPE: ICD-10-CM

## 2018-04-09 PROCEDURE — 99214 OFFICE O/P EST MOD 30 MIN: CPT | Performed by: PHYSICIAN ASSISTANT

## 2018-04-09 ASSESSMENT — ENCOUNTER SYMPTOMS
SORE THROAT: 1
DIARRHEA: 0
VOMITING: 0
SPUTUM PRODUCTION: 1
CHILLS: 0
NECK PAIN: 0
WHEEZING: 0
TINGLING: 0
COUGH: 1
DIZZINESS: 0
ABDOMINAL PAIN: 0
MYALGIAS: 0
EYE DISCHARGE: 0
RHINORRHEA: 1
HEADACHES: 0
SHORTNESS OF BREATH: 0
EYE REDNESS: 0

## 2018-04-09 NOTE — PROGRESS NOTES
"Subjective:      Nataliia Merida is a 33 y.o. female who presents with Cough (cough, congestion, fevers x3 days)            Cough   This is a new problem. Episode onset: 3 days ago. The problem has been waxing and waning. The problem occurs every few minutes. The cough is non-productive. Associated symptoms include nasal congestion, postnasal drip, rhinorrhea and a sore throat. Pertinent negatives include no chest pain, chills, ear congestion, ear pain, eye redness, headaches, myalgias, rash, shortness of breath or wheezing. Associated symptoms comments: Low grade fevers  . Nothing aggravates the symptoms. She has tried OTC cough suppressant for the symptoms. The treatment provided mild relief. There is no history of asthma, bronchitis or pneumonia.       Review of Systems   Constitutional: Positive for malaise/fatigue. Negative for chills.   HENT: Positive for congestion, postnasal drip, rhinorrhea and sore throat. Negative for ear discharge and ear pain.    Eyes: Negative for discharge and redness.   Respiratory: Positive for cough and sputum production. Negative for shortness of breath and wheezing.    Cardiovascular: Negative for chest pain and leg swelling.   Gastrointestinal: Negative for abdominal pain, diarrhea and vomiting.   Genitourinary: Negative for dysuria and urgency.   Musculoskeletal: Negative for myalgias and neck pain.   Skin: Negative for itching and rash.   Neurological: Negative for dizziness, tingling and headaches.          Objective:     /86   Pulse 89   Temp 36.6 °C (97.8 °F)   Resp 18   Ht 1.753 m (5' 9\")   Wt 117.9 kg (260 lb)   SpO2 97%   BMI 38.40 kg/m²    PMH:  has a past medical history of Abnormal Pap smear; Depression (10/19/2009); Hypertension; and Tension headache.  MEDS:   Current Outpatient Prescriptions:   •  ALPRAZolam (XANAX) 0.25 MG Tab, TAKE ONE TABLET BY MOUTH EVERY 6 HOURS AS NEEDED FOR ANXIETY, Disp: 20 Tab, Rfl: 0  •  ketorolac (TORADOL) 10 MG Tab, " TAKE ONE TABLET BY MOUTH EVERY 6 HOURS AS NEEDED, Disp: 20 Tab, Rfl: 2  •  zolmitriptan (ZOMIG-ZMT) 5 MG disintegrating tablet, Take 1/2-1 tablet po at onset of headache, may repeat dose in 2 hours if unrelieved.  Do not exceed more than 2 tablets in 24 hours., Disp: 9 Tab, Rfl: 5  •  fluoxetine (PROZAC) 20 MG Cap, TAKE ONE CAPSULE BY MOUTH DAILY, Disp: 90 Cap, Rfl: 1  •  Topiramate ER (QUDEXY XR) 50 MG Capsule ER 24 Hour Sprinkle, Take 50 mg by mouth every morning. Take 100 mg by mouth every night., Disp: 90 Each, Rfl: 5  •  topiramate (TOPAMAX) 50 MG tablet, Take one tablet qam and 1.5 tablets po qhs X 1 week then one tablet po qam and two tablets po qhs thereafter., Disp: 90 Tab, Rfl: 5  ALLERGIES:   Allergies   Allergen Reactions   • Nkda [No Known Drug Allergy]      SURGHX: History reviewed. No pertinent surgical history.  SOCHX:  reports that she has never smoked. She has never used smokeless tobacco. She reports that she does not drink alcohol or use drugs.  FH: Family history was reviewed, no pertinent findings to report    Physical Exam   Constitutional: She is oriented to person, place, and time. She appears well-developed and well-nourished.   HENT:   Head: Normocephalic and atraumatic.   Mouth/Throat: No oropharyngeal exudate.   Ears- Canals clear- TM- with clear fluid effusions bilaterally.   Pos. PND, with slight erythema- without tonsillar edema or exudate.   Mild discharge noted bilaterally- to nares.      Eyes: EOM are normal. Pupils are equal, round, and reactive to light.   Neck: Normal range of motion. Neck supple.   Cardiovascular: Normal rate and regular rhythm.    No murmur heard.  Pulmonary/Chest: Effort normal and breath sounds normal. No respiratory distress.   Musculoskeletal: Normal range of motion. She exhibits no tenderness.   Lymphadenopathy:     She has no cervical adenopathy.   Neurological: She is alert and oriented to person, place, and time.   Skin: Skin is warm. No rash noted.    Psychiatric: She has a normal mood and affect. Her behavior is normal.   Vitals reviewed.              Assessment/Plan:     1. Upper respiratory tract infection, unspecified type    It was explained to the pt. Today that due to the viral nature of the pt's illness, we will treat symptomatically today. Encouraged OTC supportive meds PRN. Humidification, increase fluids, avoid night time dairy.   Patient given precautionary s/sx that mandate immediate follow up and evaluation in the ED. Advised of risks of not doing so.    DDX, Supportive care, and indications for immediate follow-up discussed with patient.    Instructed to return to clinic or nearest emergency department if we are not available for any change in condition, further concerns, or worsening of symptoms.    The patient demonstrated a good understanding and agreed with the treatment plan.

## 2018-05-01 DIAGNOSIS — F33.0 MDD (MAJOR DEPRESSIVE DISORDER), RECURRENT EPISODE, MILD (HCC): ICD-10-CM

## 2018-05-01 RX ORDER — ALPRAZOLAM 0.25 MG/1
TABLET ORAL
Qty: 20 TAB | Refills: 0 | Status: SHIPPED
Start: 2018-05-01 | End: 2018-07-09 | Stop reason: SDUPTHER

## 2018-05-01 RX ORDER — SUMATRIPTAN 100 MG/1
TABLET, FILM COATED ORAL
Qty: 9 TAB | Refills: 5 | Status: SHIPPED | OUTPATIENT
Start: 2018-05-01 | End: 2022-12-20

## 2018-07-02 ENCOUNTER — OFFICE VISIT (OUTPATIENT)
Dept: MEDICAL GROUP | Facility: PHYSICIAN GROUP | Age: 33
End: 2018-07-02
Payer: COMMERCIAL

## 2018-07-02 VITALS
BODY MASS INDEX: 38.36 KG/M2 | TEMPERATURE: 98.1 F | DIASTOLIC BLOOD PRESSURE: 84 MMHG | RESPIRATION RATE: 16 BRPM | HEIGHT: 69 IN | SYSTOLIC BLOOD PRESSURE: 116 MMHG | OXYGEN SATURATION: 95 % | HEART RATE: 87 BPM | WEIGHT: 259 LBS

## 2018-07-02 DIAGNOSIS — Z76.89 ENCOUNTER TO ESTABLISH CARE WITH NEW DOCTOR: ICD-10-CM

## 2018-07-02 DIAGNOSIS — F32.A DEPRESSION, UNSPECIFIED DEPRESSION TYPE: ICD-10-CM

## 2018-07-02 DIAGNOSIS — F33.0 MDD (MAJOR DEPRESSIVE DISORDER), RECURRENT EPISODE, MILD (HCC): ICD-10-CM

## 2018-07-02 PROBLEM — E66.01 MORBID OBESITY WITH BMI OF 40.0-44.9, ADULT (HCC): Status: RESOLVED | Noted: 2017-03-10 | Resolved: 2018-07-02

## 2018-07-02 PROCEDURE — 99214 OFFICE O/P EST MOD 30 MIN: CPT | Performed by: NURSE PRACTITIONER

## 2018-07-02 RX ORDER — FLUOXETINE HYDROCHLORIDE 40 MG/1
20 CAPSULE ORAL DAILY
Qty: 90 CAP | Refills: 3 | Status: SHIPPED | OUTPATIENT
Start: 2018-07-02 | End: 2019-09-26 | Stop reason: SDUPTHER

## 2018-07-02 ASSESSMENT — PATIENT HEALTH QUESTIONNAIRE - PHQ9
CLINICAL INTERPRETATION OF PHQ2 SCORE: 3
SUM OF ALL RESPONSES TO PHQ QUESTIONS 1-9: 15
5. POOR APPETITE OR OVEREATING: 3 - NEARLY EVERY DAY

## 2018-07-02 NOTE — LETTER
[facility]  DARREN Franklin.  [address]  [place] [address]  Fax: [phone]   Authorization for Release/Disclosure of   Protected Health Information   Name: DEVYN CAO : 1985 SSN: [id number]   Address: [address] [place] [address] Phone:    [phone] (home)    I authorize the entity listed below to release/disclose the PHI below to:   [facility]/DAT [place] and DAT [place]   Provider or Entity Name: Dr Fischer      Address   City, State, [facility]   Phone:      Fax:     Reason for request: continuity of care   Information to be released:    [  ] LAST COLONOSCOPY,  including any PATH REPORT and follow-up  [  ] LAST FIT/COLOGUARD RESULT [  ] LAST DEXA  [  ] LAST MAMMOGRAM  [ ** ] LAST PAP  [  ] LAST LABS [  ] RETINA EXAM REPORT  [  ] IMMUNIZATION RECORDS  [  ] Release all info      [  ] Check here and initial the line next to each item to release ALL health information INCLUDING  _____ Care and treatment for drug and / or alcohol abuse  _____ HIV testing, infection status, or AIDS  _____ Genetic Testing    DATES OF SERVICE OR TIME PERIOD TO BE DISCLOSED: _____________  I understand and acknowledge that:  * This Authorization may be revoked at any time by you in writing, except if your health information has already been used or disclosed.  * Your health information that will be used or disclosed as a result of you signing this authorization could be re-disclosed by the recipient. If this occurs, your re-disclosed health information may no longer be protected by State or Federal laws.  * You may refuse to sign this Authorization. Your refusal will not affect your ability to obtain treatment.  * This Authorization becomes effective upon signing and will  on (date) __________.      If no date is indicated, this Authorization will  one (1) year from the signature date.    Name: Devyn Cao       Date:     2018       PLEASE FAX REQUESTED RECORDS BACK TO: [phone]

## 2018-07-03 NOTE — PROGRESS NOTES
Chief Complaint   Patient presents with   • Depression   • Migraine     Med Management        HISTORY OF PRESENT ILLNESS: Patient is a 33 y.o. female new patient who presents today to discuss the following issues:    Encounter to establish care with new doctor  Is here to establish with a new primary care provider.  Was previously seen by Kirsten Waller.    BMI 38.0-38.9,adult  Patient is aware of BMI elevation.  Brief discussion of diet, exercise, and lifestyle modification.      Chronic migraine  Followed by neurology.    Depression  Was diagnosed with depression at age 18 and has tried many different medications over the years.  Prozac was working for her, but recently she doesn't think that it is strong enough.  Denies SI/HI.  Discussed options, and we will increase her prozac to 40 mg.      Patient Active Problem List    Diagnosis Date Noted   • Encounter to establish care with new doctor 07/02/2018   • BMI 38.0-38.9,adult 07/02/2018   • Depression 07/02/2018   • Chronic migraine 11/03/2015   • Anxiety 01/23/2015       Allergies:Nkda [no known drug allergy]    Current Outpatient Prescriptions   Medication Sig Dispense Refill   • FLUoxetine (PROZAC) 40 MG capsule Take 1 Cap by mouth every day. 90 Cap 3   • topiramate (TOPAMAX) 100 MG Tab TAKE ONE-HALF TABLET BY MOUTH EVERY MORNING AND TAKE ONE TABLET EVERY NIGHT AT BEDTIME 45 Tab 5   • topiramate (TOPAMAX) 50 MG tablet Take one tablet qam and 1.5 tablets po qhs X 1 week then one tablet po qam and two tablets po qhs thereafter. 90 Tab 5   • ALPRAZolam (XANAX) 0.25 MG Tab TAKE ONE TABLET BY MOUTH EVERY 6 HOURS AS NEEDED FOR ANXIETY 20 Tab 0   • sumatriptan (IMITREX) 100 MG tablet Take 1/2-1 tablet po at onset of headache, may repeat dose in 2 hours if unrelieved.  Do not exceed more than 2 tablets in 24 hours. 9 Tab 5   • ketorolac (TORADOL) 10 MG Tab TAKE ONE TABLET BY MOUTH EVERY 6 HOURS AS NEEDED 20 Tab 2   • zolmitriptan (ZOMIG-ZMT) 5 MG disintegrating  "tablet Take 1/2-1 tablet po at onset of headache, may repeat dose in 2 hours if unrelieved.  Do not exceed more than 2 tablets in 24 hours. 9 Tab 5     No current facility-administered medications for this visit.        Social History   Substance Use Topics   • Smoking status: Never Smoker   • Smokeless tobacco: Never Used      Comment: \"sometimes\"   • Alcohol use No       Family Status   Relation Status   • Mother Alive   • Father Alive   No family history on file.    Review of Systems:   Constitutional: Negative for fever, chills, weight loss and malaise/fatigue.   HENT: Negative for ear pain, nosebleeds, congestion, sore throat and neck pain.    Eyes: Negative for blurred vision.   Respiratory: Negative for cough, sputum production, shortness of breath and wheezing.    Cardiovascular: Negative for chest pain, palpitations, orthopnea and leg swelling.   Gastrointestinal: Negative for heartburn, nausea, vomiting and abdominal pain.   Genitourinary: Negative for dysuria, urgency and frequency.   Musculoskeletal: Negative for myalgias, joint pain, and back pain.  Skin: Negative for rash and itching.   Neurological: Negative for dizziness, tingling, tremors, sensory change, and focal weakness.  Positive for chronic migraine headaches.   Endo/Heme/Allergies: Does not bruise/bleed easily.   Psychiatric/Behavioral: Positive for depression and anxiety.  Denies SI/HI.  All other systems reviewed and are negative except as in HPI.    Exam:  Blood pressure 116/84, pulse 87, temperature 36.7 °C (98.1 °F), resp. rate 16, height 1.753 m (5' 9\"), weight 117.5 kg (259 lb), last menstrual period 06/18/2018, SpO2 95 %, not currently breastfeeding.  General:  Well nourished, well developed female in NAD  Head: Grossly normal.  Neck: Supple without JVD or bruit. Thyroid is not enlarged.  Pulmonary: Clear to ausculation. Normal effort. No rales, ronchi, or wheezing.  Cardiovascular: Regular rate and rhythm without murmur.   Abdomen:  " Bowel sounds + x 4. Soft, non-tender, nondistended.  Extremities: No clubbing, cyanosis, or edema.  Skin: Intact with no obvious rashes or lesions.  Neuro: Grossly intact.  Psych: Alert and oriented x 3.  Mood and affect appropriate.    Medical decision-making and discussion: Nataliia is here to establish with a new primary care provider.  We reviewed her past medical history and discussed her current medications.  A prescription for prozac 40 mg was sent to her pharmacy. She will sign a records release for her previous provider, [facility], and she will plan to follow-up here as needed.         Assessment/Plan:  1. Encounter to establish care with new doctor     2. BMI 38.0-38.9,adult  Patient identified as having weight management issue.  Appropriate orders and counseling given.   3. Depression, unspecified depression type  Patient has been identified as being depressed and appropriate orders and counseling have been given   4. MDD (major depressive disorder), recurrent episode, mild (HCC)  FLUoxetine (PROZAC) 40 MG capsule   5. Chronic migraine         Return if symptoms worsen or fail to improve.    Please note that this dictation was created using voice recognition software. I have made every reasonable attempt to correct obvious errors, but I expect that there are errors of grammar and possibly content that I did not discover before finalizing the note.

## 2018-07-03 NOTE — ASSESSMENT & PLAN NOTE
Was diagnosed with depression at age 18 and has tried many different medications over the years.  Prozac was working for her, but recently she doesn't think that it is strong enough.  Denies SI/HI.  Discussed options, and we will increase her prozac to 40 mg.

## 2018-07-09 DIAGNOSIS — F33.0 MDD (MAJOR DEPRESSIVE DISORDER), RECURRENT EPISODE, MILD (HCC): ICD-10-CM

## 2018-07-09 NOTE — TELEPHONE ENCOUNTER
.Was the patient seen in the last year in this department? Yes     Does patient have an active prescription for medications requested? Yes     Received Request Via: Pharmacy

## 2018-07-10 RX ORDER — ALPRAZOLAM 0.25 MG/1
TABLET ORAL
Qty: 30 TAB | Refills: 0 | Status: SHIPPED
Start: 2018-07-10 | End: 2018-08-02 | Stop reason: SDUPTHER

## 2018-08-02 DIAGNOSIS — F33.0 MDD (MAJOR DEPRESSIVE DISORDER), RECURRENT EPISODE, MILD (HCC): ICD-10-CM

## 2018-08-02 RX ORDER — ALPRAZOLAM 0.5 MG/1
TABLET ORAL
Qty: 30 TAB | Refills: 1 | Status: SHIPPED
Start: 2018-08-02 | End: 2018-08-22 | Stop reason: SDUPTHER

## 2018-08-22 ENCOUNTER — TELEPHONE (OUTPATIENT)
Dept: NEUROLOGY | Facility: MEDICAL CENTER | Age: 33
End: 2018-08-22

## 2018-08-22 DIAGNOSIS — F33.0 MDD (MAJOR DEPRESSIVE DISORDER), RECURRENT EPISODE, MILD (HCC): ICD-10-CM

## 2018-08-22 RX ORDER — ALPRAZOLAM 0.5 MG/1
TABLET ORAL
Qty: 30 TAB | Refills: 1 | Status: SHIPPED
Start: 2018-08-22 | End: 2018-12-05 | Stop reason: SDUPTHER

## 2018-09-19 ENCOUNTER — HOSPITAL ENCOUNTER (OUTPATIENT)
Dept: LAB | Facility: MEDICAL CENTER | Age: 33
End: 2018-09-19
Attending: OBSTETRICS & GYNECOLOGY
Payer: COMMERCIAL

## 2018-09-19 PROCEDURE — 88175 CYTOPATH C/V AUTO FLUID REDO: CPT

## 2018-09-19 PROCEDURE — 87624 HPV HI-RISK TYP POOLED RSLT: CPT

## 2018-09-21 LAB
CYTOLOGY REG CYTOL: NORMAL
HPV HR 12 DNA CVX QL NAA+PROBE: NEGATIVE
HPV16 DNA SPEC QL NAA+PROBE: NEGATIVE
HPV18 DNA SPEC QL NAA+PROBE: NEGATIVE
SPECIMEN SOURCE: NORMAL

## 2018-09-24 ENCOUNTER — IMMUNIZATION (OUTPATIENT)
Dept: OCCUPATIONAL MEDICINE | Facility: CLINIC | Age: 33
End: 2018-09-24

## 2018-09-24 DIAGNOSIS — Z23 NEED FOR VACCINATION: ICD-10-CM

## 2018-09-24 PROCEDURE — 90686 IIV4 VACC NO PRSV 0.5 ML IM: CPT | Performed by: PREVENTIVE MEDICINE

## 2018-12-03 ENCOUNTER — TELEPHONE (OUTPATIENT)
Dept: NEUROLOGY | Facility: MEDICAL CENTER | Age: 33
End: 2018-12-03

## 2018-12-03 DIAGNOSIS — F33.0 MDD (MAJOR DEPRESSIVE DISORDER), RECURRENT EPISODE, MILD (HCC): ICD-10-CM

## 2018-12-05 RX ORDER — HYDROCODONE BITARTRATE AND ACETAMINOPHEN 5; 300 MG/1; MG/1
TABLET ORAL
Qty: 30 TAB | Refills: 0 | Status: SHIPPED
Start: 2018-12-05 | End: 2018-12-06 | Stop reason: SDUPTHER

## 2018-12-05 RX ORDER — ALPRAZOLAM 0.5 MG/1
TABLET ORAL
Qty: 30 TAB | Refills: 1 | Status: SHIPPED
Start: 2018-12-05 | End: 2019-01-30 | Stop reason: SDUPTHER

## 2018-12-06 RX ORDER — HYDROCODONE BITARTRATE AND ACETAMINOPHEN 5; 300 MG/1; MG/1
TABLET ORAL
Qty: 30 TAB | Refills: 0 | Status: SHIPPED | OUTPATIENT
Start: 2018-12-06 | End: 2020-12-28 | Stop reason: SDUPTHER

## 2018-12-11 ENCOUNTER — OFFICE VISIT (OUTPATIENT)
Dept: URGENT CARE | Facility: PHYSICIAN GROUP | Age: 33
End: 2018-12-11
Payer: COMMERCIAL

## 2018-12-11 VITALS
DIASTOLIC BLOOD PRESSURE: 80 MMHG | HEART RATE: 108 BPM | RESPIRATION RATE: 20 BRPM | TEMPERATURE: 98 F | SYSTOLIC BLOOD PRESSURE: 132 MMHG | BODY MASS INDEX: 39.87 KG/M2 | OXYGEN SATURATION: 96 % | WEIGHT: 270 LBS

## 2018-12-11 DIAGNOSIS — J04.0 LARYNGITIS: ICD-10-CM

## 2018-12-11 DIAGNOSIS — J10.1 INFLUENZA A: ICD-10-CM

## 2018-12-11 DIAGNOSIS — H61.23 BILATERAL IMPACTED CERUMEN: ICD-10-CM

## 2018-12-11 LAB
FLUAV+FLUBV AG SPEC QL IA: POSITIVE
INT CON NEG: NEGATIVE
INT CON NEG: NEGATIVE
INT CON POS: POSITIVE
INT CON POS: POSITIVE
S PYO AG THROAT QL: NEGATIVE

## 2018-12-11 PROCEDURE — 99214 OFFICE O/P EST MOD 30 MIN: CPT | Performed by: PHYSICIAN ASSISTANT

## 2018-12-11 PROCEDURE — 87880 STREP A ASSAY W/OPTIC: CPT | Performed by: PHYSICIAN ASSISTANT

## 2018-12-11 PROCEDURE — 87804 INFLUENZA ASSAY W/OPTIC: CPT | Performed by: PHYSICIAN ASSISTANT

## 2018-12-11 RX ORDER — OSELTAMIVIR PHOSPHATE 75 MG/1
75 CAPSULE ORAL 2 TIMES DAILY
Qty: 10 CAP | Refills: 0 | Status: SHIPPED | OUTPATIENT
Start: 2018-12-11 | End: 2018-12-16

## 2018-12-11 NOTE — LETTER
December 11, 2018         Patient: Nataliia Merida   YOB: 1985   Date of Visit: 12/11/2018           To Whom it May Concern:    Nataliia Merida was seen in my clinic on 12/11/2018. She tested positive for Influenza A.    If you have any questions or concerns, please don't hesitate to call.        Sincerely,           Ria Reeves P.A.-C.  Electronically Signed

## 2018-12-12 DIAGNOSIS — J11.1 INFLUENZA: ICD-10-CM

## 2018-12-12 DIAGNOSIS — R05.9 COUGH IN ADULT: ICD-10-CM

## 2018-12-12 RX ORDER — PROMETHAZINE HYDROCHLORIDE AND CODEINE PHOSPHATE 6.25; 1 MG/5ML; MG/5ML
5 SYRUP ORAL 3 TIMES DAILY PRN
Qty: 150 ML | Refills: 0 | Status: SHIPPED | OUTPATIENT
Start: 2018-12-12 | End: 2018-12-22

## 2018-12-14 ASSESSMENT — ENCOUNTER SYMPTOMS
HEADACHES: 1
NAUSEA: 0
MYALGIAS: 1
SWOLLEN GLANDS: 0
ANOREXIA: 1
FATIGUE: 1
VOMITING: 0
SORE THROAT: 1
FEVER: 1
CHILLS: 1
CHANGE IN BOWEL HABIT: 0
COUGH: 1
ABDOMINAL PAIN: 0

## 2018-12-14 NOTE — PROGRESS NOTES
Subjective:      Nataliia Merida is a 33 y.o. female who presents with Pharyngitis (x3 days, cough, body aches, loss of voice )      Influenza   This is a new problem. The current episode started in the past 7 days (Symptoms started a little less than 48 hours ago). The problem occurs constantly. The problem has been gradually worsening. Associated symptoms include anorexia, chills, congestion, coughing, fatigue, a fever (Subjective), headaches, myalgias and a sore throat. Pertinent negatives include no abdominal pain, change in bowel habit, chest pain, nausea, swollen glands or vomiting. Nothing aggravates the symptoms. She has tried NSAIDs, sleep and drinking for the symptoms. The treatment provided mild relief.       Review of Systems   Constitutional: Positive for chills, fatigue and fever (Subjective).   HENT: Positive for congestion and sore throat.    Respiratory: Positive for cough.    Cardiovascular: Negative for chest pain.   Gastrointestinal: Positive for anorexia. Negative for abdominal pain, change in bowel habit, nausea and vomiting.   Musculoskeletal: Positive for myalgias.   Neurological: Positive for headaches.       PMH:  has a past medical history of Abnormal Pap smear; Depression (10/19/2009); Hypertension; and Tension headache.  MEDS:   Current Outpatient Prescriptions:   •  oseltamivir (TAMIFLU) 75 MG Cap, Take 1 Cap by mouth 2 times a day for 5 days., Disp: 10 Cap, Rfl: 0  •  ALPRAZolam (XANAX) 0.5 MG Tab, TAKE ONE TABLET BY MOUTH EVERY 6 HOURS AS NEEDED FOR ANXIETY, Disp: 30 Tab, Rfl: 1  •  FLUoxetine (PROZAC) 40 MG capsule, Take 1 Cap by mouth every day., Disp: 90 Cap, Rfl: 3  •  topiramate (TOPAMAX) 100 MG Tab, TAKE ONE-HALF TABLET BY MOUTH EVERY MORNING AND TAKE ONE TABLET EVERY NIGHT AT BEDTIME, Disp: 45 Tab, Rfl: 5  •  topiramate (TOPAMAX) 50 MG tablet, Take one tablet qam and 1.5 tablets po qhs X 1 week then one tablet po qam and two tablets po qhs thereafter., Disp: 90 Tab, Rfl:  5  •  promethazine-codeine (PHENERGAN-CODEINE) 6.25-10 MG/5ML Syrup, Take 5 mL by mouth 3 times a day as needed for Cough for up to 10 days., Disp: 150 mL, Rfl: 0  •  Hydrocodone-Acetaminophen (VICODIN) 5-300 MG Tab, Take one tablet po Q6 hours prn migraine.  Do not exceed more than 2 tablets in 24 hours., Disp: 30 Tab, Rfl: 0  •  sumatriptan (IMITREX) 100 MG tablet, Take 1/2-1 tablet po at onset of headache, may repeat dose in 2 hours if unrelieved.  Do not exceed more than 2 tablets in 24 hours., Disp: 9 Tab, Rfl: 5  •  ketorolac (TORADOL) 10 MG Tab, TAKE ONE TABLET BY MOUTH EVERY 6 HOURS AS NEEDED, Disp: 20 Tab, Rfl: 2  •  zolmitriptan (ZOMIG-ZMT) 5 MG disintegrating tablet, Take 1/2-1 tablet po at onset of headache, may repeat dose in 2 hours if unrelieved.  Do not exceed more than 2 tablets in 24 hours., Disp: 9 Tab, Rfl: 5  ALLERGIES:   Allergies   Allergen Reactions   • Nkda [No Known Drug Allergy]      SURGHX: No past surgical history on file.  SOCHX:  reports that she has never smoked. She has never used smokeless tobacco. She reports that she does not drink alcohol or use drugs.  FH: Family history was reviewed, no pertinent findings to report     Objective:     /80   Pulse (!) 108   Temp 36.7 °C (98 °F)   Resp 20   Wt 122.5 kg (270 lb)   SpO2 96%   BMI 39.87 kg/m²        Physical Exam   Constitutional: She is oriented to person, place, and time. She appears well-developed and well-nourished.   HENT:   Head: Normocephalic and atraumatic.   Right Ear: External ear normal.   Left Ear: External ear normal.   Nose: Mucosal edema and rhinorrhea present.   Mouth/Throat: Uvula is midline and mucous membranes are normal. Posterior oropharyngeal erythema present. No oropharyngeal exudate.   Bilateral ear canals with cerumen impaction   Eyes: Pupils are equal, round, and reactive to light. Conjunctivae are normal.   Neck: Normal range of motion.   Cardiovascular: Regular rhythm and normal heart sounds.   Tachycardia present.    No murmur heard.  Pulmonary/Chest: Effort normal and breath sounds normal. No accessory muscle usage. No respiratory distress. She has no decreased breath sounds. She has no wheezes. She has no rhonchi. She has no rales.   Lymphadenopathy:     She has no cervical adenopathy.   Neurological: She is alert and oriented to person, place, and time.   Skin: Skin is warm and dry. Capillary refill takes less than 2 seconds.   Psychiatric: She has a normal mood and affect. Her behavior is normal. Judgment normal.   Vitals reviewed.      POCT Rapid Strep A - Negative    POCT Influenza A/B - Positive for Influenza A      Cerumen removal completed by MA using irrigation technique.  Examination of the ears s/p cerumen removal reveals bilateral TM's intact without significant erythema. External canals sustain mild erythema no active bleeding present      Assessment/Plan:     1. Influenza A  - POCT Influenza A/B  - oseltamivir (TAMIFLU) 75 MG Cap; Take 1 Cap by mouth 2 times a day for 5 days.  Dispense: 10 Cap; Refill: 0  - PO fluids  - Rest  - Tylenol or ibuprofen as needed for fever > 100.4 F  - Note given for work    2. Laryngitis  - POCT Rapid Strep A    3. Bilateral impacted cerumen  - Follow up as needed      Differential Diagnosis, natural history, and supportive care discussed. Return to the Urgent Care or follow up with your PCP if symptoms fail to resolve, or for any new or worsening symptoms. Emergency room precautions discussed. Patient and/or family appears understanding of information.

## 2019-01-09 ENCOUNTER — TELEPHONE (OUTPATIENT)
Dept: NEUROLOGY | Facility: MEDICAL CENTER | Age: 34
End: 2019-01-09

## 2019-01-30 ENCOUNTER — OFFICE VISIT (OUTPATIENT)
Dept: NEUROLOGY | Facility: MEDICAL CENTER | Age: 34
End: 2019-01-30
Payer: COMMERCIAL

## 2019-01-30 VITALS
OXYGEN SATURATION: 95 % | WEIGHT: 267.2 LBS | SYSTOLIC BLOOD PRESSURE: 138 MMHG | HEIGHT: 69 IN | DIASTOLIC BLOOD PRESSURE: 82 MMHG | BODY MASS INDEX: 39.58 KG/M2 | HEART RATE: 68 BPM | TEMPERATURE: 97.2 F

## 2019-01-30 DIAGNOSIS — F41.9 ANXIETY: ICD-10-CM

## 2019-01-30 DIAGNOSIS — F33.0 MDD (MAJOR DEPRESSIVE DISORDER), RECURRENT EPISODE, MILD (HCC): ICD-10-CM

## 2019-01-30 PROBLEM — Z76.89 ENCOUNTER TO ESTABLISH CARE WITH NEW DOCTOR: Status: RESOLVED | Noted: 2018-07-02 | Resolved: 2019-01-30

## 2019-01-30 PROCEDURE — 99213 OFFICE O/P EST LOW 20 MIN: CPT | Performed by: NURSE PRACTITIONER

## 2019-01-30 RX ORDER — ZOLMITRIPTAN 5 MG/1
SPRAY NASAL
Qty: 6 EACH | Refills: 5 | Status: SHIPPED
Start: 2019-01-30 | End: 2019-12-19

## 2019-01-30 RX ORDER — ALPRAZOLAM 0.5 MG/1
TABLET ORAL
Qty: 30 TAB | Refills: 1 | Status: SHIPPED | OUTPATIENT
Start: 2019-01-30 | End: 2019-04-28

## 2019-01-30 ASSESSMENT — ENCOUNTER SYMPTOMS
SORE THROAT: 0
CONSTITUTIONAL NEGATIVE: 1
DIARRHEA: 0
MUSCULOSKELETAL NEGATIVE: 1
DOUBLE VISION: 0
NAUSEA: 0
NERVOUS/ANXIOUS: 1
DEPRESSION: 0
ABDOMINAL PAIN: 0
COUGH: 0
HEADACHES: 1
VOMITING: 0

## 2019-01-30 NOTE — PROGRESS NOTES
Subjective:      Nataliia Merida is a 33 y.o. female who presents with Follow-Up (Chronic migraine)          HPI Still has about 5-6 migraines per month with nearly daily headaches.  She is hopeful for much better management.    Typical headache: band of tightness on her forehead/temples that intensifies with tension/stress.  Photophobia, minimal nausea.    Has failed Botox.    Typically treats a migraine with imitrex tablets if she would like to sleep for the day.    TPX 50-100mg    Current Outpatient Prescriptions   Medication Sig Dispense Refill   • topiramate (TOPAMAX) 100 MG Tab TAKE ONE-HALF TABLET BY MOUTH EVERY MORNING AND TAKE ONE TABLET BY MOUTH EVERY NIGHT AT BEDTIME 45 Tab 5   • ALPRAZolam (XANAX) 0.5 MG Tab TAKE ONE TABLET BY MOUTH EVERY 6 HOURS AS NEEDED FOR ANXIETY 30 Tab 1   • FLUoxetine (PROZAC) 40 MG capsule Take 1 Cap by mouth every day. 90 Cap 3   • sumatriptan (IMITREX) 100 MG tablet Take 1/2-1 tablet po at onset of headache, may repeat dose in 2 hours if unrelieved.  Do not exceed more than 2 tablets in 24 hours. 9 Tab 5   • ketorolac (TORADOL) 10 MG Tab TAKE ONE TABLET BY MOUTH EVERY 6 HOURS AS NEEDED 20 Tab 2   • zolmitriptan (ZOMIG-ZMT) 5 MG disintegrating tablet Take 1/2-1 tablet po at onset of headache, may repeat dose in 2 hours if unrelieved.  Do not exceed more than 2 tablets in 24 hours. 9 Tab 5   • topiramate (TOPAMAX) 50 MG tablet Take one tablet qam and 1.5 tablets po qhs X 1 week then one tablet po qam and two tablets po qhs thereafter. 90 Tab 5     No current facility-administered medications for this visit.        Review of Systems   Constitutional: Negative.    HENT: Negative for hearing loss, nosebleeds and sore throat.         No recent head injury.   Eyes: Negative for double vision.        No new loss of vision.   Respiratory: Negative for cough.         No recent lung infections.   Cardiovascular: Negative for chest pain.   Gastrointestinal: Negative for abdominal  "pain, diarrhea, nausea and vomiting.   Genitourinary: Negative.    Musculoskeletal: Negative.    Skin: Negative.    Neurological: Positive for headaches.   Endo/Heme/Allergies:        No history of endocrine dysfunction.  No new problems.   Psychiatric/Behavioral: Negative for depression. The patient is nervous/anxious.         No recent mood changes.          Objective:     /82   Pulse 68   Temp 36.2 °C (97.2 °F) (Temporal)   Ht 1.753 m (5' 9\")   Wt 121.2 kg (267 lb 3.2 oz)   SpO2 95%   BMI 39.46 kg/m²      Physical Exam   Constitutional: She is oriented to person, place, and time. No distress.   HENT:   Head: Normocephalic and atraumatic.   Nose: Nose normal.   Eyes: Pupils are equal, round, and reactive to light.   Cardiovascular: Normal rate and regular rhythm.  Exam reveals no gallop and no friction rub.    No murmur heard.  Pulmonary/Chest: Effort normal and breath sounds normal. No respiratory distress.   Lymphadenopathy:     She has no cervical adenopathy.   Neurological: She is alert and oriented to person, place, and time.   CN II: Fundi normal, visual fields full to confrontation.  CN III, IV, VI: Pupils equal, round, and reactive to light.  Extraocular movements full and intact in horizontal and vertical gaze.  CN V: Normal in motor and sensory modalities.  CN VII: No evidence of facial asymmetry.  CN VIII: Hearing grossly intact.  CN IX, X: Palate elevates symmetrically and in the midline.  CN XI: Normal sternocleidomastoid strength.  CN XII: Tongue is in the midline.    Motor: Normal muscle bulk and tone, with full and symmetric strength.  Sensory: Intact to light touch, pinprick, vibration, proprioception, and graphesthesia.  DTR's: 2+ throughout with flexor plantar responses.  Cerebellar/Coordination: Normal finger to finger, finger-tapping, rapid alternating movements, and foot tapping.  Gait: Normal casual gait.  Walks well on heels and toes, as well as in tandem gait.   Skin: Skin is " warm and dry.           Assessment/Plan:     Chronic Migraine:  Headaches persist-- 5-6 bad migraines per month with nearly a daily headache.    Counseled regarding anxiety and depression and activity level.  I would recommend a sleep study in the future.     Continue Topiramate 50-100mg.     Encouraged to be active, exercise, eat healthy and routinely.  Recommend starting magnesium everyday.  Needs to drink more water!     Ms Merida has chronic daily migraines, defined as having 15 or more headaches days per month over a minimum of the last three months. Episodes last more than 4 hours (if untreated). Previous treatments for at least three months have included beta-blockers such as propranolol, anti-epileptics such as Topamax, Qudexy, Zonegran, NSAIDs such as naproxen, anti-depressants such as amitriptyline and Lexapro, Botox, and triptans. The patients has not responded to any of these treatments. At this point the only option left would be to use Ajovy (CGRP modulator).    New Rx for Zomig nasal spray provided.    We will continue Xanax as needed for panic attacks/anxiety.    Return for follow-up in 6 months.  To stay in close telephone contact.

## 2019-06-03 ENCOUNTER — TELEPHONE (OUTPATIENT)
Dept: NEUROLOGY | Facility: MEDICAL CENTER | Age: 34
End: 2019-06-03

## 2019-06-03 ENCOUNTER — OFFICE VISIT (OUTPATIENT)
Dept: NEUROLOGY | Facility: MEDICAL CENTER | Age: 34
End: 2019-06-03
Payer: COMMERCIAL

## 2019-06-03 VITALS
DIASTOLIC BLOOD PRESSURE: 74 MMHG | HEART RATE: 85 BPM | HEIGHT: 69 IN | SYSTOLIC BLOOD PRESSURE: 124 MMHG | WEIGHT: 265 LBS | BODY MASS INDEX: 39.25 KG/M2 | OXYGEN SATURATION: 94 % | TEMPERATURE: 97.9 F

## 2019-06-03 DIAGNOSIS — F41.9 ANXIETY: ICD-10-CM

## 2019-06-03 DIAGNOSIS — F32.A DEPRESSION, UNSPECIFIED DEPRESSION TYPE: ICD-10-CM

## 2019-06-03 PROCEDURE — 99214 OFFICE O/P EST MOD 30 MIN: CPT | Performed by: NURSE PRACTITIONER

## 2019-06-03 ASSESSMENT — PATIENT HEALTH QUESTIONNAIRE - PHQ9
1. LITTLE INTEREST OR PLEASURE IN DOING THINGS: NOT AT ALL
3. TROUBLE FALLING OR STAYING ASLEEP OR SLEEPING TOO MUCH: NEARLY EVERY DAY
8. MOVING OR SPEAKING SO SLOWLY THAT OTHER PEOPLE COULD HAVE NOTICED. OR THE OPPOSITE, BEING SO FIGETY OR RESTLESS THAT YOU HAVE BEEN MOVING AROUND A LOT MORE THAN USUAL: NEARLY EVERY DAY
6. FEELING BAD ABOUT YOURSELF - OR THAT YOU ARE A FAILURE OR HAVE LET YOURSELF OR YOUR FAMILY DOWN: NOT AL ALL
5. POOR APPETITE OR OVEREATING: NOT AT ALL
7. TROUBLE CONCENTRATING ON THINGS, SUCH AS READING THE NEWSPAPER OR WATCHING TELEVISION: NOT AT ALL
SUM OF ALL RESPONSES TO PHQ9 QUESTIONS 1 AND 2: 0
2. FEELING DOWN, DEPRESSED, IRRITABLE, OR HOPELESS: NOT AT ALL
4. FEELING TIRED OR HAVING LITTLE ENERGY: NEARLY EVERY DAY
SUM OF ALL RESPONSES TO PHQ QUESTIONS 1-9: 9
9. THOUGHTS THAT YOU WOULD BE BETTER OFF DEAD, OR OF HURTING YOURSELF: NOT AT ALL

## 2019-06-03 NOTE — PROGRESS NOTES
"Subjective:      Nataliia Merida is a 34 y.o. female who presents with Follow-Up (Chronic migraine)      Present with  today.      HPI  Migraines:  Still with nearly a daily headache/head pressure.  Perhaps her migraines have improved though with the increase of Topamax.  They are worried about potential or presumed side effects though and would like to taper the Topamax.    Of concern, she is having cognitive changes.  Sometimes she notices more of an impact in the morning hours.  She is concerned that the memory deficits are becoming more obvious and more peristent.    Forgetfulness is the biggest concern.    Describes loosing track of her words, forgetful of what she is saying.  Gets \"stuck\" on a word.    Naps are excessive and does sleep a lot at night.  Extremely tired every day.  She has not discussed with PCP.    Anxiety is heightened when she notices the cognitive changes.  At work, she tries to make light of the situation.    4/2015 Brain MRI Impression     1.  There are a few scattered punctate areas of of increased T2 signal intensity in the periventricular white matter consistent with areas of chronic ischemia, demyelination, or gliosis.    2.  No change from previous exam..         Current Outpatient Prescriptions   Medication Sig Dispense Refill   • ketorolac (TORADOL) 10 MG Tab TAKE ONE TABLET BY MOUTH EVERY 6 HOURS AS NEEDED 3 Tab 2   • NON SPECIFIED Ajovy 225mg/1.5ml injection  Use one injection, 225mg, SQ one time per month. 1 Each 5   • topiramate (TOPAMAX) 100 MG Tab TAKE ONE-HALF TABLET BY MOUTH EVERY MORNING AND TAKE ONE TABLET BY MOUTH EVERY NIGHT AT BEDTIME 45 Tab 5   • FLUoxetine (PROZAC) 40 MG capsule Take 1 Cap by mouth every day. 90 Cap 3   • topiramate (TOPAMAX) 50 MG tablet Take one tablet qam and 1.5 tablets po qhs X 1 week then one tablet po qam and two tablets po qhs thereafter. 90 Tab 5   • zolmitriptan (ZOMIG) 5 MG nasal solution Use one spray at onset of headache, may " "repeat dose in 2 hours if unrelieved. (Patient not taking: Reported on 6/3/2019) 6 Each 5   • sumatriptan (IMITREX) 100 MG tablet Take 1/2-1 tablet po at onset of headache, may repeat dose in 2 hours if unrelieved.  Do not exceed more than 2 tablets in 24 hours. 9 Tab 5   • zolmitriptan (ZOMIG-ZMT) 5 MG disintegrating tablet Take 1/2-1 tablet po at onset of headache, may repeat dose in 2 hours if unrelieved.  Do not exceed more than 2 tablets in 24 hours. (Patient not taking: Reported on 6/3/2019) 9 Tab 5     No current facility-administered medications for this visit.      Review of Systems   Constitutional: Positive for malaise/fatigue. Negative for weight loss.   HENT: Negative for hearing loss, nosebleeds and sore throat.         No recent head injury.   Eyes: Negative for double vision.        No new loss of vision.   Respiratory: Negative for cough.         No recent lung infections.   Cardiovascular: Negative for chest pain.   Gastrointestinal: Negative for abdominal pain, diarrhea, nausea and vomiting.   Genitourinary: Negative.    Musculoskeletal: Negative.    Skin: Negative.    Neurological: Positive for headaches. Negative for focal weakness and seizures.   Endo/Heme/Allergies:        No history of endocrine dysfunction.  No new problems.   Psychiatric/Behavioral: Positive for depression and memory loss. The patient is not nervous/anxious.         No recent mood changes.          Objective:     /74   Pulse 85   Temp 36.6 °C (97.9 °F) (Temporal)   Ht 1.753 m (5' 9\")   Wt 120.2 kg (265 lb)   SpO2 94%   BMI 39.13 kg/m²      Physical Exam   Constitutional: She is oriented to person, place, and time. She appears well-developed and well-nourished.   HENT:   Head: Normocephalic and atraumatic.   Eyes: Pupils are equal, round, and reactive to light.   Neck: Normal range of motion.   Cardiovascular: Normal rate and regular rhythm.    Pulmonary/Chest: Effort normal.   Neurological: She is alert and " oriented to person, place, and time. She exhibits normal muscle tone. Gait normal.   No observable changes in neurologic status.  See initial new patient examination for details.    Skin: Skin is warm.   Psychiatric: She exhibits abnormal recent memory.   Flat affect               Assessment/Plan:       Chronic Migraine:  With nearly a daily headache but minimal intensified migraines since dose increase of Topiramate from 100mg qhs to 50mg qam and 100mg qhs.     Counseled regarding anxiety and depression and activity level.  I would recommend a sleep study in the future as well as establishing with Behavioral Health, at the very least with a counselor.     Ultimately, they wish to begin topiramate reduction from 50-100mg to 100mg qhs.     Encouraged to be active, exercise, eat healthy and routinely.  Recommend starting magnesium everyday.  Needs to drink more water!     Ms Merida has chronic daily migraines, defined as having 15 or more headaches days per month over a minimum of the last three months. Episodes last more than 4 hours (if untreated). Previous treatments for at least three months have included beta-blockers such as propranolol, anti-epileptics such as Topamax, Qudexy, Zonegran, NSAIDs such as naproxen, anti-depressants such as amitriptyline and Lexapro, Botox, and triptans. The patients has not responded to any of these treatments. At this point the only option left would be to use Ajovy (CGRP modulator).     She has many options of triptans and NSAID's for abortive treatment.     We will continue Xanax as needed for panic attacks/anxiety.    Referral placed for [facility] per request.     Return for follow-up in 6-8 weeks.  To stay in close telephone contact.      I spent 35+ minutes with this patient, over fifty percent was spent counseling patient on their condition, best management practices, reviewing test results and risks and benefits of treatment.

## 2019-06-05 ASSESSMENT — ENCOUNTER SYMPTOMS
MUSCULOSKELETAL NEGATIVE: 1
SORE THROAT: 0
DOUBLE VISION: 0
FOCAL WEAKNESS: 0
DIARRHEA: 0
DEPRESSION: 1
NERVOUS/ANXIOUS: 0
NAUSEA: 0
WEIGHT LOSS: 0
ABDOMINAL PAIN: 0
SEIZURES: 0
MEMORY LOSS: 1
COUGH: 0
HEADACHES: 1
VOMITING: 0

## 2019-07-17 ENCOUNTER — HOSPITAL ENCOUNTER (OUTPATIENT)
Facility: MEDICAL CENTER | Age: 34
End: 2019-07-17
Payer: COMMERCIAL

## 2019-07-17 LAB
BDY FAT % MEASURED: 45.7 %
BP DIAS: 80 MMHG
BP SYS: 122 MMHG
CHOLEST SERPL-MCNC: 175 MG/DL (ref 100–199)
DIABETES HTDIA: NO
EVENT NAME HTEVT: NORMAL
FASTING HTFAS: YES
FASTING STATUS PATIENT QL REPORTED: NORMAL
GLUCOSE SERPL-MCNC: 100 MG/DL (ref 65–99)
HDLC SERPL-MCNC: 43 MG/DL
HYPERTENSION HTHYP: NO
LDLC SERPL CALC-MCNC: 115 MG/DL
SCREENING LOC CITY HTCIT: NORMAL
SCREENING LOC STATE HTSTA: NORMAL
SCREENING LOCATION HTLOC: NORMAL
SMOKING HTSMO: NO
SUBSCRIBER ID HTSID: NORMAL
TRIGL SERPL-MCNC: 85 MG/DL (ref 0–149)

## 2019-07-17 PROCEDURE — 82947 ASSAY GLUCOSE BLOOD QUANT: CPT

## 2019-07-17 PROCEDURE — 80061 LIPID PANEL: CPT

## 2019-07-17 PROCEDURE — S5190 WELLNESS ASSESSMENT BY NONPH: HCPCS

## 2019-09-23 ENCOUNTER — IMMUNIZATION (OUTPATIENT)
Dept: OCCUPATIONAL MEDICINE | Facility: CLINIC | Age: 34
End: 2019-09-23

## 2019-09-23 DIAGNOSIS — Z23 NEED FOR VACCINATION: ICD-10-CM

## 2019-09-23 PROCEDURE — 90686 IIV4 VACC NO PRSV 0.5 ML IM: CPT | Performed by: PREVENTIVE MEDICINE

## 2019-12-04 DIAGNOSIS — F33.0 MDD (MAJOR DEPRESSIVE DISORDER), RECURRENT EPISODE, MILD (HCC): ICD-10-CM

## 2019-12-05 RX ORDER — FLUOXETINE HYDROCHLORIDE 40 MG/1
CAPSULE ORAL
Qty: 30 CAP | Refills: 0 | Status: SHIPPED | OUTPATIENT
Start: 2019-12-05 | End: 2019-12-19 | Stop reason: SDUPTHER

## 2019-12-17 ENCOUNTER — TELEPHONE (OUTPATIENT)
Dept: NEUROLOGY | Facility: MEDICAL CENTER | Age: 34
End: 2019-12-17

## 2019-12-17 NOTE — TELEPHONE ENCOUNTER
Logan from patient:    Cheng Mckeon! Was wondering if you could go through my clinicals from my visit to [facility] in October. I know they are looking at putting me on a once a month injection instead of the topamax. They said I would need to do that through you.       Consult scanned in media .

## 2019-12-18 ENCOUNTER — TELEPHONE (OUTPATIENT)
Dept: NEUROLOGY | Facility: MEDICAL CENTER | Age: 34
End: 2019-12-18

## 2019-12-18 NOTE — TELEPHONE ENCOUNTER
Let's do a quick appointment to review the once a month injection option and prescribe.  Last seen June 2019.

## 2019-12-19 ENCOUNTER — OFFICE VISIT (OUTPATIENT)
Dept: MEDICAL GROUP | Facility: PHYSICIAN GROUP | Age: 34
End: 2019-12-19
Payer: COMMERCIAL

## 2019-12-19 VITALS
DIASTOLIC BLOOD PRESSURE: 86 MMHG | SYSTOLIC BLOOD PRESSURE: 102 MMHG | RESPIRATION RATE: 16 BRPM | HEIGHT: 69 IN | TEMPERATURE: 98.8 F | OXYGEN SATURATION: 98 % | BODY MASS INDEX: 40.88 KG/M2 | HEART RATE: 82 BPM | WEIGHT: 276 LBS

## 2019-12-19 DIAGNOSIS — F33.0 MDD (MAJOR DEPRESSIVE DISORDER), RECURRENT EPISODE, MILD (HCC): ICD-10-CM

## 2019-12-19 DIAGNOSIS — F32.A DEPRESSION, UNSPECIFIED DEPRESSION TYPE: ICD-10-CM

## 2019-12-19 PROCEDURE — 99213 OFFICE O/P EST LOW 20 MIN: CPT | Performed by: NURSE PRACTITIONER

## 2019-12-19 RX ORDER — FLUOXETINE HYDROCHLORIDE 40 MG/1
40 CAPSULE ORAL DAILY
Qty: 90 CAP | Refills: 3 | Status: SHIPPED | OUTPATIENT
Start: 2019-12-19 | End: 2021-01-08 | Stop reason: SDUPTHER

## 2019-12-19 RX ORDER — TOPIRAMATE 100 MG/1
100 TABLET, FILM COATED ORAL
COMMUNITY
Start: 2019-09-03 | End: 2019-12-19

## 2019-12-19 RX ORDER — FLUOXETINE HYDROCHLORIDE 20 MG/1
20 CAPSULE ORAL
COMMUNITY
End: 2019-12-19

## 2019-12-20 NOTE — PROGRESS NOTES
Chief Complaint   Patient presents with   • Medication Management       HISTORY OF PRESENT ILLNESS: Patient is a 34 y.o. female established patient who presents today to discuss the following issues:    Depression  Mood has improved since increasing prozac to 40 mg.  She would like to continue.  Due for refills.      BMI 40.0-44.9, adult ([facility])  Patient is aware of BMI elevation.  Brief discussion of diet, exercise, and lifestyle modification.        Patient Active Problem List    Diagnosis Date Noted   • Depression 07/02/2018   • BMI 40.0-44.9, adult ([facility]) 03/10/2017   • Chronic migraine 11/03/2015   • Anxiety 01/23/2015       Allergies:Nkda [no known drug allergy]    Current Outpatient Medications   Medication Sig Dispense Refill   • fluoxetine (PROZAC) 40 MG capsule Take 1 Cap by mouth every day. 90 Cap 3   • topiramate (TOPAMAX) 100 MG Tab TAKE ONE-HALF TABLET BY MOUTH EVERY MORNING AND TAKE ONE TABLET BY MOUTH EVERY NIGHT AT BEDTIME (Patient taking differently: Take 100 mg by mouth every day.) 45 Tab 5   • ketorolac (TORADOL) 10 MG Tab TAKE ONE TABLET BY MOUTH EVERY 6 HOURS AS NEEDED 3 Tab 2   • NON SPECIFIED Ajovy 225mg/1.5ml injection  Use one injection, 225mg, SQ one time per month. 1 Each 5   • sumatriptan (IMITREX) 100 MG tablet Take 1/2-1 tablet po at onset of headache, may repeat dose in 2 hours if unrelieved.  Do not exceed more than 2 tablets in 24 hours. 9 Tab 5   • zolmitriptan (ZOMIG-ZMT) 5 MG disintegrating tablet Take 1/2-1 tablet po at onset of headache, may repeat dose in 2 hours if unrelieved.  Do not exceed more than 2 tablets in 24 hours. 9 Tab 5     No current facility-administered medications for this visit.        Social History     Tobacco Use   • Smoking status: Never Smoker   • Smokeless tobacco: Never Used   Substance Use Topics   • Alcohol use: No     Alcohol/week: 0.0 oz   • Drug use: No       Family Status   Relation Name Status   • Mo  Alive   • Fa  Alive   History reviewed. No  "pertinent family history.    Review of Systems:   Constitutional: Negative for fever, chills, weight loss and malaise/fatigue.   HENT: Negative for ear pain, nosebleeds, congestion, sore throat and neck pain.    Eyes: Negative for blurred vision.   Respiratory: Negative for cough, sputum production, shortness of breath and wheezing.    Cardiovascular: Negative for chest pain, palpitations, orthopnea and leg swelling.   Gastrointestinal: Negative for heartburn, nausea, vomiting and abdominal pain.   Genitourinary: Negative for dysuria, urgency and frequency.   Musculoskeletal: Negative for myalgias, joint pain, and back pain.  Skin: Negative for rash and itching.   Neurological: Negative for dizziness, tingling, tremors, sensory change, focal weakness and headaches.   Endo/Heme/Allergies: Does not bruise/bleed easily.   Psychiatric/Behavioral: Positive for stable depression.  Denies SI/HI.    All other systems reviewed and are negative except as in HPI.    Exam:  Blood Pressure 102/86   Pulse 82   Temperature 37.1 °C (98.8 °F)   Respiration 16   Height 1.753 m (5' 9\")   Weight (Abnormal) 125.2 kg (276 lb)   Oxygen Saturation 98%   General:  Well nourished, well developed female in NAD  Head: Grossly normal.  Neck: Supple without JVD or bruit. Thyroid is not enlarged.  Pulmonary: Clear to ausculation. Normal effort. No rales, ronchi, or wheezing.  Cardiovascular: Regular rate and rhythm without murmur.   Abdomen:  Soft, nontender, nondistended.  Extremities: No clubbing, cyanosis, or edema.  Skin: Intact with no obvious rashes or lesions.  Neuro: Grossly intact.  Psych: Alert and oriented x 3.  Mood and affect appropriate.    Medical decision-making and discussion: Nataliia is here today for follow-up.  Her records were reviewed, medication was refilled, and she will follow-up here as needed.          Assessment/Plan:  1. BMI 40.0-44.9, adult ([facility])  Patient identified as having weight management issue.  Appropriate " orders and counseling given.   2. MDD (major depressive disorder), recurrent episode, mild ([facility]CC)  fluoxetine (PROZAC) 40 MG capsule   3. Depression, unspecified depression type         Return if symptoms worsen or fail to improve.    Please note that this dictation was created using voice recognition software. I have made every reasonable attempt to correct obvious errors, but I expect that there are errors of grammar and possibly content that I did not discover before finalizing the note.

## 2019-12-30 NOTE — ASSESSMENT & PLAN NOTE
Mood has improved since increasing prozac to 40 mg.  She would like to continue.  Due for refills.

## 2020-01-20 ENCOUNTER — OFFICE VISIT (OUTPATIENT)
Dept: NEUROLOGY | Facility: MEDICAL CENTER | Age: 35
End: 2020-01-20
Payer: COMMERCIAL

## 2020-01-20 VITALS
DIASTOLIC BLOOD PRESSURE: 84 MMHG | HEIGHT: 69 IN | TEMPERATURE: 98.6 F | WEIGHT: 276 LBS | BODY MASS INDEX: 40.88 KG/M2 | HEART RATE: 75 BPM | RESPIRATION RATE: 16 BRPM | OXYGEN SATURATION: 98 % | SYSTOLIC BLOOD PRESSURE: 106 MMHG

## 2020-01-20 DIAGNOSIS — F41.9 ANXIETY: ICD-10-CM

## 2020-01-20 PROCEDURE — 99214 OFFICE O/P EST MOD 30 MIN: CPT | Performed by: NURSE PRACTITIONER

## 2020-01-20 ASSESSMENT — ENCOUNTER SYMPTOMS
DEPRESSION: 1
NERVOUS/ANXIOUS: 0
DIARRHEA: 0
HEADACHES: 1
NAUSEA: 0
MUSCULOSKELETAL NEGATIVE: 1
VOMITING: 0
CONSTITUTIONAL NEGATIVE: 1
COUGH: 0
DOUBLE VISION: 0
SORE THROAT: 0
ABDOMINAL PAIN: 0
SEIZURES: 0

## 2020-01-20 NOTE — PROGRESS NOTES
Subjective:      Nataliia Merida is a 34 y.o. female who presents with Follow-Up (Chronic migraine)            HPI Here today for follow-up.    Most recently evaluated per Migraine clinic at [facility].  It has been recommended to her to try the CGRP inhibitor.    She has been sleeping less, more of a normal schedule because she can sleep up to 17 hours per day, and focusing on eating better.    Migraine profile:  Usually in the frontal region, pulsating in the frontal region.  Headaches are usually noticed in the morning.  It is tolerable and not as unbearable as in the past.    4/2015 Brain MRI:  Impression       1.  There are a few scattered punctate areas of of increased T2 signal intensity in the periventricular white matter consistent with areas of chronic ischemia, demyelination, or gliosis.    2.  No change from previous exam..          Ref. Range 4/29/2017 07:51   25-Hydroxy   Vitamin D 25 Latest Ref Range: 30 - 100 ng/mL 11 (L)     MVI daily   Vit D 5000IU  Current Outpatient Medications   Medication Sig Dispense Refill   • fluoxetine (PROZAC) 40 MG capsule Take 1 Cap by mouth every day. 90 Cap 3   • topiramate (TOPAMAX) 100 MG Tab TAKE ONE-HALF TABLET BY MOUTH EVERY MORNING AND TAKE ONE TABLET BY MOUTH EVERY NIGHT AT BEDTIME (Patient taking differently: Take 100 mg by mouth every day.) 45 Tab 5   • ketorolac (TORADOL) 10 MG Tab TAKE ONE TABLET BY MOUTH EVERY 6 HOURS AS NEEDED 3 Tab 2   • sumatriptan (IMITREX) 100 MG tablet Take 1/2-1 tablet po at onset of headache, may repeat dose in 2 hours if unrelieved.  Do not exceed more than 2 tablets in 24 hours. 9 Tab 5   • zolmitriptan (ZOMIG-ZMT) 5 MG disintegrating tablet Take 1/2-1 tablet po at onset of headache, may repeat dose in 2 hours if unrelieved.  Do not exceed more than 2 tablets in 24 hours. 9 Tab 5   • NON SPECIFIED Ajovy 225mg/1.5ml injection  Use one injection, 225mg, SQ one time per month. 1 Each 5     No current facility-administered  "medications for this visit.        Review of Systems   Constitutional: Negative.  Weight loss: 12# weight gain.   HENT: Negative for hearing loss, nosebleeds and sore throat.         No recent head injury.   Eyes: Negative for double vision.        No new loss of vision.   Respiratory: Negative for cough.         No recent lung infections.   Cardiovascular: Negative for chest pain.   Gastrointestinal: Negative for abdominal pain, diarrhea, nausea and vomiting.   Genitourinary: Negative.    Musculoskeletal: Negative.    Skin: Negative.    Neurological: Positive for headaches. Negative for seizures.   Endo/Heme/Allergies:        No history of endocrine dysfunction.  No new problems.   Psychiatric/Behavioral: Positive for depression. The patient is not nervous/anxious.         No recent mood changes.          Objective:     /84 (BP Location: Left arm, Patient Position: Sitting, BP Cuff Size: Adult long)   Pulse 75   Temp 37 °C (98.6 °F) (Temporal)   Resp 16   Ht 1.753 m (5' 9.02\")   Wt (!) 125.2 kg (276 lb)   SpO2 98%   BMI 40.74 kg/m²      Physical Exam  Constitutional:       Appearance: She is well-developed. She is obese.   HENT:      Head: Normocephalic and atraumatic.   Eyes:      Pupils: Pupils are equal, round, and reactive to light.   Neck:      Musculoskeletal: Normal range of motion.   Cardiovascular:      Rate and Rhythm: Normal rate and regular rhythm.   Pulmonary:      Effort: Pulmonary effort is normal.   Musculoskeletal: Normal range of motion.   Skin:     General: Skin is warm.   Neurological:      Mental Status: She is alert and oriented to person, place, and time.      Motor: No abnormal muscle tone.      Gait: Gait normal.      Comments: No observable changes in neurologic status.  See initial new patient examination for details.    Psychiatric:      Comments: Flat affect                Assessment/Plan:     Chronic Migraine:  Intercurrently seen at [facility] with " recommendation for lifestyle changes and to introduce CGRP inhibitor.    Migraine profile:  Usually in the frontal region, pulsating in the frontal region.  Headaches are usually noticed in the morning.  It is tolerable and not as unbearable as in the past.    Continue Topiramate 100mg qhs.     Counseled regarding anxiety and depression and activity level.  I would recommend a sleep study in the future as well as establishing with Behavioral Health, at the very least with a counselor.    Encouraged to be active, exercise, eat healthy and routinely.  Recommend starting magnesium everyday.  Needs to drink more water!     Ms Merida has chronic daily migraines, defined as having 15 or more headaches days per month over a minimum of the last three months. Episodes last more than 4 hours (if untreated). Previous treatments for at least three months have included beta-blockers such as propranolol, anti-epileptics such as Topamax, Qudexy, Zonegran, NSAIDs such as naproxen, anti-depressants such as amitriptyline and Lexapro, Botox, and triptans. The patients has not responded to any of these treatments. At this point the only option left would be to use Emgality (CGRP modulator) with new Rx being submitted today.     She has many options of triptans and NSAID's for abortive treatment.     We will continue Xanax as needed for panic attacks/anxiety.     Return for follow-up in 6 months.  To stay in close telephone contact.      I spent 35+ minutes with this patient, over fifty percent was spent counseling patient on their condition, best management practices, reviewing test results and risks and benefits of treatment.

## 2020-07-27 ENCOUNTER — TELEPHONE (OUTPATIENT)
Dept: NEUROLOGY | Facility: MEDICAL CENTER | Age: 35
End: 2020-07-27

## 2020-10-06 RX ORDER — GALCANEZUMAB 120 MG/ML
120 INJECTION, SOLUTION SUBCUTANEOUS
Qty: 1 EACH | Refills: 2 | Status: SHIPPED | OUTPATIENT
Start: 2020-10-06 | End: 2021-01-04 | Stop reason: SDUPTHER

## 2020-10-09 ENCOUNTER — IMMUNIZATION (OUTPATIENT)
Dept: OCCUPATIONAL MEDICINE | Facility: CLINIC | Age: 35
End: 2020-10-09

## 2020-10-09 DIAGNOSIS — Z23 NEED FOR VACCINATION: ICD-10-CM

## 2020-10-09 PROCEDURE — 90686 IIV4 VACC NO PRSV 0.5 ML IM: CPT | Performed by: NURSE PRACTITIONER

## 2020-12-04 ENCOUNTER — OFFICE VISIT (OUTPATIENT)
Dept: URGENT CARE | Facility: PHYSICIAN GROUP | Age: 35
End: 2020-12-04
Payer: COMMERCIAL

## 2020-12-04 ENCOUNTER — HOSPITAL ENCOUNTER (OUTPATIENT)
Facility: MEDICAL CENTER | Age: 35
End: 2020-12-04
Attending: FAMILY MEDICINE
Payer: COMMERCIAL

## 2020-12-04 VITALS
SYSTOLIC BLOOD PRESSURE: 130 MMHG | DIASTOLIC BLOOD PRESSURE: 100 MMHG | TEMPERATURE: 98 F | RESPIRATION RATE: 20 BRPM | HEIGHT: 69 IN | HEART RATE: 67 BPM | OXYGEN SATURATION: 99 % | BODY MASS INDEX: 39.84 KG/M2 | WEIGHT: 269 LBS

## 2020-12-04 DIAGNOSIS — J02.0 STREP THROAT: ICD-10-CM

## 2020-12-04 LAB
FLUAV+FLUBV AG SPEC QL IA: NEGATIVE
INT CON NEG: NEGATIVE
INT CON NEG: NEGATIVE
INT CON POS: POSITIVE
INT CON POS: POSITIVE
S PYO AG THROAT QL: POSITIVE

## 2020-12-04 PROCEDURE — 87880 STREP A ASSAY W/OPTIC: CPT | Performed by: FAMILY MEDICINE

## 2020-12-04 PROCEDURE — 87804 INFLUENZA ASSAY W/OPTIC: CPT | Performed by: FAMILY MEDICINE

## 2020-12-04 PROCEDURE — 99214 OFFICE O/P EST MOD 30 MIN: CPT | Performed by: FAMILY MEDICINE

## 2020-12-04 PROCEDURE — U0003 INFECTIOUS AGENT DETECTION BY NUCLEIC ACID (DNA OR RNA); SEVERE ACUTE RESPIRATORY SYNDROME CORONAVIRUS 2 (SARS-COV-2) (CORONAVIRUS DISEASE [COVID-19]), AMPLIFIED PROBE TECHNIQUE, MAKING USE OF HIGH THROUGHPUT TECHNOLOGIES AS DESCRIBED BY CMS-2020-01-R: HCPCS

## 2020-12-04 RX ORDER — AMOXICILLIN 875 MG/1
875 TABLET, COATED ORAL 2 TIMES DAILY
Qty: 20 TAB | Refills: 0 | Status: SHIPPED | OUTPATIENT
Start: 2020-12-04 | End: 2020-12-14

## 2020-12-04 NOTE — PROGRESS NOTES
"Subjective:        Chief Complaint   Patient presents with   • Pharyngitis     Pt reports headaches, fatigue,body aches. Onset 2 days.                  Pharyngitis   This is a new problem. The current episode started in the past 2 days. The problem has been unchanged. There has been no fever. The pain is moderate. Associated symptoms include a HA, myalgias. Pertinent negatives include no abdominal pain,   coughing, diarrhea, headaches, shortness of breath or vomiting. no exposure to strep or mono.   has tried acetaminophen for the symptoms. The treatment provided mild relief.     Social History     Tobacco Use   • Smoking status: Never Smoker   • Smokeless tobacco: Never Used   Substance Use Topics   • Alcohol use: No     Alcohol/week: 0.0 oz   • Drug use: No       Past Medical History:   Diagnosis Date   • Abnormal Pap smear    • Depression 10/19/2009   • Hypertension    • Tension headache        Review of Systems   Constitutional: Positive for malaise/fatigue. Negative for fever and weight loss.   HENT: Positive for sore throat  Respiratory: Negative for cough, sputum production and shortness of breath.    Cardiovascular: Negative for chest pain.   Gastrointestinal: Negative for nausea, vomiting, abdominal pain and diarrhea.   Genitourinary: Negative.    Neurological: Negative for dizziness and headaches.   All other systems reviewed and are negative.         Objective:   /100 (BP Location: Left arm, Patient Position: Sitting, BP Cuff Size: Large adult)   Pulse 67   Temp 36.7 °C (98 °F) (Temporal)   Resp 20   Ht 1.753 m (5' 9\")   Wt 122 kg (269 lb)   SpO2 99%         Physical Exam   Constitutional:   oriented to person, place, and time.  appears well-developed and well-nourished. No distress.   HENT:   Head: Normocephalic and atraumatic.   Right Ear: External ear normal.   Left Ear: External ear normal.   Nose: Mucosal edema present. Right sinus exhibits no maxillary sinus tenderness and no frontal " sinus tenderness. Left sinus exhibits no maxillary sinus tenderness and no frontal sinus tenderness.   Mouth/Throat: no posterior oropharyngeal exudate.   There is posterior oropharyngeal erythema present. No posterior oropharyngeal edema.   Tonsils 2+ bilaterally     Eyes: Conjunctivae and EOM are normal. Pupils are equal, round, and reactive to light. Right eye exhibits no discharge. Left eye exhibits no discharge. No scleral icterus.   Neck: Normal range of motion. Neck supple. No JVD present. No tracheal deviation present. No thyromegaly present.   Cardiovascular: Normal rate, regular rhythm, normal heart sounds and intact distal pulses.  Exam reveals no friction rub.    No murmur heard.  Pulmonary/Chest: Effort normal and breath sounds normal. No respiratory distress.   no wheezes.   no rales.    Musculoskeletal:  exhibits no edema.   Lymphadenopathy:   + cervical LAD  Neurological:   alert and oriented to person, place, and time.   Skin: Skin is warm and dry. No erythema.   Psychiatric:   normal mood and affect.   Nursing note and vitals reviewed.         Rapid influenza negative.     Assessment/Plan:     1. Strep throat     - POCT Rapid Strep A positive  - amoxicillin (AMOXIL) 875 MG tablet; Take 1 Tab by mouth 2 times a day for 10 days.  Dispense: 20 Tab; Refill: 0          Will send screen for COVID  Home isolation per CDC guidelines

## 2020-12-05 DIAGNOSIS — J02.0 STREP THROAT: ICD-10-CM

## 2020-12-05 LAB
COVID ORDER STATUS COVID19: NORMAL
SARS-COV-2 RNA RESP QL NAA+PROBE: NOTDETECTED
SPECIMEN SOURCE: NORMAL

## 2020-12-07 ENCOUNTER — TELEPHONE (OUTPATIENT)
Dept: NEUROLOGY | Facility: MEDICAL CENTER | Age: 35
End: 2020-12-07

## 2020-12-07 NOTE — TELEPHONE ENCOUNTER
"Logan from patient:       \"Hi there! I don't have a pcp anymore at this time. But I called [facility] this morning with my symptoms and they said I should get a COVID test. All the [facility] and [facility] are booked up. Can I get an order please? I am having full body chills, aches, nausea, sore throat, headaches\"     Please advise.     "

## 2020-12-07 NOTE — TELEPHONE ENCOUNTER
I did not see that it had been address already. I did reply to patient seeing if she had any concerns or flare ups from her headaches.

## 2020-12-07 NOTE — TELEPHONE ENCOUNTER
I addressed this problem on Saturday.  Can you please f/u with a call to make sure she is taken care of?  Have her let me know if her headaches flare-up or has any other concerns.

## 2020-12-11 NOTE — TELEPHONE ENCOUNTER
Can I ask what time of day her headaches seem to be the worst?  Also, how often are her headaches occuring?  Are they reduced or totally aborted with rescue medication-- if so, which ones.   requires assistance

## 2020-12-20 DIAGNOSIS — Z23 NEED FOR VACCINATION: ICD-10-CM

## 2020-12-21 ENCOUNTER — APPOINTMENT (OUTPATIENT)
Dept: FAMILY PLANNING/WOMEN'S HEALTH CLINIC | Facility: IMMUNIZATION CENTER | Age: 35
End: 2020-12-21
Attending: FAMILY MEDICINE
Payer: COMMERCIAL

## 2020-12-21 ENCOUNTER — IMMUNIZATION (OUTPATIENT)
Dept: FAMILY PLANNING/WOMEN'S HEALTH CLINIC | Facility: IMMUNIZATION CENTER | Age: 35
End: 2020-12-21
Payer: COMMERCIAL

## 2020-12-21 DIAGNOSIS — Z23 ENCOUNTER FOR VACCINATION: Primary | ICD-10-CM

## 2020-12-21 DIAGNOSIS — Z23 NEED FOR VACCINATION: ICD-10-CM

## 2020-12-21 PROCEDURE — 0001A PFIZER SARS-COV-2 VACCINE: CPT

## 2020-12-21 PROCEDURE — 91300 PFIZER SARS-COV-2 VACCINE: CPT

## 2020-12-28 DIAGNOSIS — F41.9 ANXIETY: ICD-10-CM

## 2020-12-28 RX ORDER — ALPRAZOLAM 0.5 MG/1
0.5 TABLET ORAL 2 TIMES DAILY PRN
Qty: 30 TAB | Refills: 0 | Status: SHIPPED | OUTPATIENT
Start: 2020-12-28 | End: 2021-03-28

## 2020-12-28 RX ORDER — ALPRAZOLAM 0.5 MG/1
TABLET ORAL
Qty: 30 TAB | Refills: 0 | Status: CANCELLED | OUTPATIENT
Start: 2020-12-28 | End: 2021-01-28

## 2020-12-28 RX ORDER — HYDROCODONE BITARTRATE AND ACETAMINOPHEN 5; 300 MG/1; MG/1
TABLET ORAL
Qty: 30 TAB | Refills: 0 | Status: SHIPPED | OUTPATIENT
Start: 2020-12-28 | End: 2021-06-16 | Stop reason: SDUPTHER

## 2020-12-28 NOTE — TELEPHONE ENCOUNTER
"Received request via: Patient    Was the patient seen in the last year in this department? Yes    Does the patient have an active prescription (recently filled or refills available) for medication(s) requested? No     MyChart from patient:     \"Cheng Mckeon! I was wondering if I can get a fill on my Vicodin and my Xanax please?\"    Last seen  01/20/2020   Next appt not scheduled     "

## 2021-01-04 RX ORDER — GALCANEZUMAB 120 MG/ML
120 INJECTION, SOLUTION SUBCUTANEOUS
Qty: 3 EACH | Refills: 1 | Status: SHIPPED | OUTPATIENT
Start: 2021-01-04 | End: 2021-06-16 | Stop reason: SDUPTHER

## 2021-01-04 NOTE — TELEPHONE ENCOUNTER
"Received request via: Pharmacy    Was the patient seen in the last year in this department? Yes    Does the patient have an active prescription (recently filled or refills available) for medication(s) requested? No     MyChart from patient:    \"Cheng Mckeon, you asked me to remind you about getting the Emgality refilled and putting a new auth through insurance at the beginning of 2021 so we can see if it will actually cover any of it. I'm due for my next dose on the 12th so I just wanted to get started on the process ASAP.     Thank you!\"           "

## 2021-01-07 ENCOUNTER — TELEMEDICINE (OUTPATIENT)
Dept: NEUROLOGY | Facility: MEDICAL CENTER | Age: 36
End: 2021-01-07
Attending: NURSE PRACTITIONER
Payer: COMMERCIAL

## 2021-01-07 ENCOUNTER — TELEMEDICINE (OUTPATIENT)
Dept: MEDICAL GROUP | Facility: IMAGING CENTER | Age: 36
End: 2021-01-07
Payer: COMMERCIAL

## 2021-01-07 VITALS — HEIGHT: 69 IN | BODY MASS INDEX: 39.71 KG/M2

## 2021-01-07 VITALS — WEIGHT: 275 LBS | HEIGHT: 69 IN | BODY MASS INDEX: 40.73 KG/M2

## 2021-01-07 DIAGNOSIS — Z13.220 SCREENING FOR HYPERLIPIDEMIA: ICD-10-CM

## 2021-01-07 DIAGNOSIS — Z13.1 SCREENING FOR DIABETES MELLITUS (DM): ICD-10-CM

## 2021-01-07 DIAGNOSIS — Z13.0 SCREENING FOR DEFICIENCY ANEMIA: ICD-10-CM

## 2021-01-07 DIAGNOSIS — F41.9 ANXIETY: ICD-10-CM

## 2021-01-07 DIAGNOSIS — R53.83 FATIGUE, UNSPECIFIED TYPE: ICD-10-CM

## 2021-01-07 DIAGNOSIS — G89.29 CHRONIC PAIN OF RIGHT KNEE: ICD-10-CM

## 2021-01-07 DIAGNOSIS — M25.561 CHRONIC PAIN OF RIGHT KNEE: ICD-10-CM

## 2021-01-07 DIAGNOSIS — F33.41 RECURRENT MAJOR DEPRESSIVE DISORDER, IN PARTIAL REMISSION (HCC): ICD-10-CM

## 2021-01-07 DIAGNOSIS — Z76.89 ENCOUNTER TO ESTABLISH CARE WITH NEW DOCTOR: ICD-10-CM

## 2021-01-07 DIAGNOSIS — Z82.0 FAMILY HISTORY OF HUNTINGTON'S DISEASE: ICD-10-CM

## 2021-01-07 PROCEDURE — 99214 OFFICE O/P EST MOD 30 MIN: CPT | Mod: 95,CR | Performed by: NURSE PRACTITIONER

## 2021-01-07 ASSESSMENT — PATIENT HEALTH QUESTIONNAIRE - PHQ9
5. POOR APPETITE OR OVEREATING: NOT AT ALL
6. FEELING BAD ABOUT YOURSELF - OR THAT YOU ARE A FAILURE OR HAVE LET YOURSELF OR YOUR FAMILY DOWN: NOT AL ALL
3. TROUBLE FALLING OR STAYING ASLEEP OR SLEEPING TOO MUCH: MORE THAN HALF THE DAYS
SUM OF ALL RESPONSES TO PHQ9 QUESTIONS 1 AND 2: 0
SUM OF ALL RESPONSES TO PHQ QUESTIONS 1-9: 4
4. FEELING TIRED OR HAVING LITTLE ENERGY: MORE THAN HALF THE DAYS
8. MOVING OR SPEAKING SO SLOWLY THAT OTHER PEOPLE COULD HAVE NOTICED. OR THE OPPOSITE, BEING SO FIGETY OR RESTLESS THAT YOU HAVE BEEN MOVING AROUND A LOT MORE THAN USUAL: NOT AT ALL
7. TROUBLE CONCENTRATING ON THINGS, SUCH AS READING THE NEWSPAPER OR WATCHING TELEVISION: NOT AT ALL
2. FEELING DOWN, DEPRESSED, IRRITABLE, OR HOPELESS: NOT AT ALL
1. LITTLE INTEREST OR PLEASURE IN DOING THINGS: NOT AT ALL
9. THOUGHTS THAT YOU WOULD BE BETTER OFF DEAD, OR OF HURTING YOURSELF: NOT AT ALL

## 2021-01-07 ASSESSMENT — PAIN SCALES - GENERAL: PAINLEVEL: 3=SLIGHT PAIN

## 2021-01-07 NOTE — PROGRESS NOTES
Virtual Visit: Established Patient   This visit was conducted via Zoom using secure and encrypted videoconferencing technology. The patient was in a private location in the state of [place].    The patient's identity was confirmed and verbal consent was obtained for this virtual visit.    Subjective:   CC: Chronic Migraines and Anxiety      Chief Complaint   Patient presents with   • Follow-Up     Chronic migraine       Nataliia Merida is a 35 y.o. female presenting for evaluation and management of:      Chronic Migraines:  Greatly appreciates the start of Emgality and reports she has many fewer headaches overall and utilizes an abortive medication rarely.  The Emgality is working very well as long as she stays consistent.  If the injection is late then she notices more headaches.    Triggers: poor sleep schedule and eating schedule.    She would like to taper off the Topamax.  Recalls that when she was taking a much higher dose than 100mg qhs she experienced cognitive fogginess and paraesthesias.    Abortive treatment: Aleve and tylenol      Strep throat in December 2020.    Anxiety primarily due to the chronic and debilitating condition of her 1/2 brother with [place]'s Disease.    4/2015 Brain MRI:  Impression         1.  There are a few scattered punctate areas of of increased T2 signal intensity in the periventricular white matter consistent with areas of chronic ischemia, demyelination, or gliosis.    2.  No change from previous exam..        ROS   Denies any recent fevers or chills. No nausea or vomiting. No chest pains or shortness of breath.     Allergies   Allergen Reactions   • Nkda [No Known Drug Allergy]        CBD edible at night prn  Current medicines (including changes today)  Current Outpatient Medications   Medication Sig Dispense Refill   • Galcanezumab-gnlm (EMGALITY) 120 MG/ML Solution Auto-injector Inject 120 mg under the skin every 30 days. 3 Each 1   • HYDROcodone-Acetaminophen  (VICODIN) 5-300 MG Tab Take one tablet po Q6 hours prn migraine.  Do not exceed more than 2 tablets in 24 hours. 30 Tab 0   • ALPRAZolam (XANAX) 0.5 MG Tab Take 1 Tab by mouth 2 times a day as needed for Anxiety (do not exceed more than 2 tablets in 24 hours) for up to 90 days. 30 Tab 0   • topiramate (TOPAMAX) 100 MG Tab TAKE ONE-HALF TABLET BY MOUTH EVERY MORNING AND TAKE ONE TABLET BY MOUTH EVERY NIGHT AT BEDTIME 45 Tab 5   • fluoxetine (PROZAC) 40 MG capsule Take 1 Cap by mouth every day. 90 Cap 3   • ketorolac (TORADOL) 10 MG Tab TAKE ONE TABLET BY MOUTH EVERY 6 HOURS AS NEEDED 3 Tab 2   • sumatriptan (IMITREX) 100 MG tablet Take 1/2-1 tablet po at onset of headache, may repeat dose in 2 hours if unrelieved.  Do not exceed more than 2 tablets in 24 hours. 9 Tab 5   • zolmitriptan (ZOMIG-ZMT) 5 MG disintegrating tablet Take 1/2-1 tablet po at onset of headache, may repeat dose in 2 hours if unrelieved.  Do not exceed more than 2 tablets in 24 hours. (Patient not taking: Reported on 12/4/2020) 9 Tab 5     No current facility-administered medications for this visit.        Patient Active Problem List    Diagnosis Date Noted   • Depression 07/02/2018   • BMI 40.0-44.9, adult ([facility]) 03/10/2017   • Chronic migraine 11/03/2015   • Anxiety 01/23/2015       No family history on file.    She  has a past medical history of Abnormal Pap smear, Depression (10/19/2009), Hypertension, and Tension headache.  She  has no past surgical history on file.       Objective:   There were no vitals taken for this visit.    Physical Exam: morbid obesity  Constitutional: Alert, no distress, well-groomed.  Skin: No rashes in visible areas.  Eye: Round. Conjunctiva clear, lids normal. No icterus.   ENMT: Lips pink without lesions, good dentition, moist mucous membranes. Phonation normal.  Neck: No masses, no thyromegaly. Moves freely without pain.  Respiratory: Unlabored respiratory effort, no cough or audible wheeze  Psych: Alert and  oriented x3, normal affect and mood, mildly flat.      Assessment and Plan:   The following treatment plan was discussed:     Chronic Migraine:  Has utilized Emgality monthly self-injection for one year now.  Migraine profile has greatly reduced and headaches are rare unless she is overdue for the CGRP injection.     Migraine profile:  Usually in the frontal region, pulsating in the frontal region.  Headaches are usually noticed in the morning.  It is tolerable and not as unbearable as in the past.     Has taken Topiramate 100mg qhs for 2+ years.  Plans to taper off Topamax from 100mg qhs to stopping per instructions.    We will continue Xanax as needed for panic attacks/anxiety    Referral placed for genetic counseling as paternal 1/2 brother is in late stages of [place]'s Disease.    Follow-up: Return in one year or sooner if needed.

## 2021-01-07 NOTE — PROGRESS NOTES
Virtual Visit: New Patient   This visit was conducted via Zoom using secure and encrypted videoconferencing technology. The patient was in a private location in the state of [place].    The patient's identity was confirmed and verbal consent was obtained for this virtual visit.  Patient is aware that this is a billable encounter.  Subjective:   CC:   Chief Complaint   Patient presents with   • [facility]   • Knee Pain     right knee, x 2 months     Nataliia Merida is a 35 y.o. female presenting to [facility]. Prior PCP was Tyshawn Enriquez and to discuss the evaluation and management of:     Depression:  Reports that this is a well controlled condition.  Reports taking Prozac 40 mg daily for the past 4 to 5 years.  Denies any side effects from medication.  States that she does have ongoing excessive daytime fatigue and she is not sure if this is from her medication and/or other medications that she uses for her treatment of her migraines.  Denies any noted snoring and/or apnea while she sleeps.  States that she takes Xanax 0.5 mg as needed for anxiety.  States that sometimes her anxiety is a trigger for migraines.  States that she does not use this medication often.  Denies any concerns at this time.    Depression Screening  Little interest or pleasure in doing things?   Not at all  Feeling down, depressed , or hopeless?  Not at all  Trouble falling or staying asleep, or sleeping too much?   More than half the days  Feeling tired or having little energy?   More than half the days  Poor appetite or overeating?   Not at all  Feeling bad about yourself - or that you are a failure or have let yourself or your family down?  Not al all  Trouble concentrating on things, such as reading the newspaper or watching television?  Not at all  Moving or speaking so slowly that other people could have noticed.  Or the opposite - being so fidgety or restless that you have been moving around a lot more than usual?   Not at  all  Thoughts that you would be better off dead, or of hurting yourself?   Not at all  Patient Health Questionnaire Score:  4    If depressive symptoms identified deferred to follow up visit unless specifically addressed in assesment and plan.    Interpretation of PHQ-9 Total Score   Score Severity   1-4 No Depression   5-9 Mild Depression   10-14 Moderate Depression   15-19 Moderately Severe Depression   20-27 Severe Depression    Migraines:  Reports seeing neurology about every 6 months for management of ongoing migraines.  States with her current regimen of medication her migraines are well controlled.  States that she is currently working with her neurologist to decrease her Topamax due to side effects and having better control of migraines with once a month injectable Emgality.  States that she has weaned herself to 50 mg Topamax daily and will continue at this regimen for 1 month and evaluate if she can continue to wean.  Denies any concerns at this time.    Right knee pain:  Reports a history of injury to her right knee when she was 18 years old.  Denies any intervention at that time.  Denies any problems since.  States for the last 2 months she has increasing right knee pain with walking and bending down.  States that her knee is swollen daily.  Denies any new injury or trauma to area.  Reports hearing popping in her knee often.  Denies any giving of knee going up or down stairs.  States pain is intermittently sharp and/or dull with certain positions.  Denies taking any regular over-the-counter medication and or remedies to improve symptoms.  States that she is intermittently used ibuprofen, ice, rice, and heat with minimal improvement.    ROS:  Constitutional: Denies fever, chills, night sweats, weight loss/gain or malaise. Fatigue, see HPI.  HENT: Denies nasal congestion, sore throat, hearing loss, enlarged thyroid, or difficulty swallowing.   Eyes: Denies changes in vision, pain.  Wears corrective wear,  glasses.  Respiratory: Denies cough, SOB at rest or activity.    Cardiovascular: Denies tachycardia, chest pain, palpitations, or leg swelling.   Gastrointestinal: Denies N/V/C/D, abdominal pain, loss appetite, reflux, or hematochezia.  Genitourinary: Denies difficulty voiding, dysuria, nocturia, or hematuria.   Skin: Negative for rash or worrisome moles.   Neurological: Negative for dizziness, focal weakness and headaches.   Endo/Heme/Allergies: Denies bruise/bleed easily, allergies.   Psychiatric/Behavioral: Denies depression, nervous/anxious, difficulty sleeping.  MSK: Right knee pain, see HPI.    Allergies   Allergen Reactions   • Nkda [No Known Drug Allergy]      Current medicines (including changes today)  Current Outpatient Medications   Medication Sig Dispense Refill   • fluoxetine (PROZAC) 40 MG capsule Take 1 Cap by mouth every day. 90 Cap 3   • Galcanezumab-gnlm (EMGALITY) 120 MG/ML Solution Auto-injector Inject 120 mg under the skin every 30 days. 3 Each 1   • HYDROcodone-Acetaminophen (VICODIN) 5-300 MG Tab Take one tablet po Q6 hours prn migraine.  Do not exceed more than 2 tablets in 24 hours. 30 Tab 0   • ALPRAZolam (XANAX) 0.5 MG Tab Take 1 Tab by mouth 2 times a day as needed for Anxiety (do not exceed more than 2 tablets in 24 hours) for up to 90 days. 30 Tab 0   • topiramate (TOPAMAX) 100 MG Tab TAKE ONE-HALF TABLET BY MOUTH EVERY MORNING AND TAKE ONE TABLET BY MOUTH EVERY NIGHT AT BEDTIME 45 Tab 5   • ketorolac (TORADOL) 10 MG Tab TAKE ONE TABLET BY MOUTH EVERY 6 HOURS AS NEEDED 3 Tab 2   • sumatriptan (IMITREX) 100 MG tablet Take 1/2-1 tablet po at onset of headache, may repeat dose in 2 hours if unrelieved.  Do not exceed more than 2 tablets in 24 hours. 9 Tab 5     No current facility-administered medications for this visit.      She  has a past medical history of Abnormal Pap smear, Anxiety, Depression (10/19/2009), Hypertension, Migraine, and Tension headache.  She  has no past surgical  "history on file.    Family History   Problem Relation Age of Onset   • No Known Problems Mother    • No Known Problems Father    • No Known Problems Sister    • Other Brother         huntings disease   • No Known Problems Maternal Grandmother    • No Known Problems Maternal Grandfather    • No Known Problems Paternal Grandmother    • No Known Problems Paternal Grandfather    • No Known Problems Brother    • No Known Problems Sister      Family Status   Relation Name Status   • Mo  Alive   • Fa  Alive     Patient Active Problem List    Diagnosis Date Noted   • Depression 07/02/2018   • BMI 40.0-44.9, adult (HCC) 03/10/2017   • Chronic migraine 11/03/2015   • Anxiety 01/23/2015      Objective:   Ht 1.753 m (5' 9\")   Wt 124.7 kg (275 lb)   LMP 12/16/2020   BMI 40.61 kg/m²   Respiratory rate observed during visit: 14 bpm    Physical Exam:  Constitutional: Alert, no distress, well-groomed.  Skin: No rashes in visible areas.  Eye: Round. Conjunctiva clear, lids normal. No icterus.   ENMT: Lips pink without lesions, good dentition, moist mucous membranes. Phonation normal.  Neck: No masses, no thyromegaly. Moves freely without pain.  Respiratory: Unlabored respiratory effort, no cough or audible wheeze  Psych: Alert and oriented x3, normal affect and mood.     Assessment and Plan:   1. Encounter to establish care with new doctor  2. Screening for diabetes mellitus (DM)  3. Screening for hyperlipidemia  4. Screening for deficiency anemia  Reviewed with patient medication use and side effects. Medical, past, surgical history reviewed with patient. Discussed with patient the risk and benefits of receiving vaccines. Discussed CDC recommendations for immunizations and USPSTF guidelines for screening exams.  Discussed getting second COVID-19 vaccine and then in 4 weeks from vaccine getting Tdap, verbalized understanding willingness. Encouraged patient to wash hands regularly and avoid sick contacts while supporting immune " system with Vitamin C and Zinc.  Discussed the overall benefit of a well-balanced diet, regular exercise, and stress management, verbalized understanding. Discussed preventive screening labs with patient, verbalized understanding. Will evaluate plan of care once labs are obtained.    - CBC WITH DIFFERENTIAL; Future  - Comp Metabolic Panel; Future  - Lipid Profile; Future    5. Chronic pain of right knee  This is a chronic stable condition. Discussed referral to orthopedics for further evaluation, verbalized understanding and willingness to participation in referral. Discussed using ice therapy 4 times a day for 20 minutes each time. Discussed taking ibuprofen 200-600 mg TID as needed for pain with food. Instructed to not exceed more than 3 days in a row to decrease risk of GI bleed or GI upset. RICE: Rest, Ice (ice therapy 4 times a day for 20 minutes each time), Compression (ankle brace or ace bandage, as well as, wear supportive shoes), and Elevation.     - REFERRAL TO ORTHOPEDICS    6. Fatigue, unspecified type  This is a chronic stable condition. Will evaluate plan of care once labs are obtained.    - TSH WITH REFLEX TO FT4; Future    7. Recurrent major depressive disorder, in partial remission (HCC)  This is a chronic stable condition. Reviewed PHQ9 results with patient, verbalized understanding.  Instructed continuing Prozac 40 mg daily as tolerated. Discussed with patient serotonin syndrome and symptoms that she is at increased risk if she takes NSAIDs continuously, verbalized understanding.  Instructed to notify PCP before he starts any medication or supplement.  Directed to seek emergency services if she develops any suicidal/homicidal thoughts.    - fluoxetine (PROZAC) 40 MG capsule; Take 1 Cap by mouth every day.  Dispense: 90 Cap; Refill: 3    8. Chronic migraine  This is a chronic stable condition.  Instructed to continue plan of care and medication regimen of neurology.     Follow-up: Return for  Pending lab results and referral.

## 2021-01-08 RX ORDER — FLUOXETINE HYDROCHLORIDE 40 MG/1
40 CAPSULE ORAL DAILY
Qty: 90 CAP | Refills: 3 | Status: SHIPPED | OUTPATIENT
Start: 2021-01-08 | End: 2022-02-03

## 2021-01-11 ENCOUNTER — IMMUNIZATION (OUTPATIENT)
Dept: FAMILY PLANNING/WOMEN'S HEALTH CLINIC | Facility: IMMUNIZATION CENTER | Age: 36
End: 2021-01-11
Attending: FAMILY MEDICINE
Payer: COMMERCIAL

## 2021-01-11 DIAGNOSIS — Z23 ENCOUNTER FOR VACCINATION: Primary | ICD-10-CM

## 2021-01-11 PROCEDURE — 0002A PFIZER SARS-COV-2 VACCINE: CPT

## 2021-01-11 PROCEDURE — 91300 PFIZER SARS-COV-2 VACCINE: CPT

## 2021-03-18 ENCOUNTER — HOSPITAL ENCOUNTER (OUTPATIENT)
Facility: MEDICAL CENTER | Age: 36
End: 2021-03-18
Attending: PHYSICIAN ASSISTANT
Payer: COMMERCIAL

## 2021-03-18 LAB
BASOPHILS # BLD AUTO: 0.5 % (ref 0–1.8)
BASOPHILS # BLD: 0.04 K/UL (ref 0–0.12)
EOSINOPHIL # BLD AUTO: 0.08 K/UL (ref 0–0.51)
EOSINOPHIL NFR BLD: 0.9 % (ref 0–6.9)
ERYTHROCYTE [DISTWIDTH] IN BLOOD BY AUTOMATED COUNT: 44.9 FL (ref 35.9–50)
HCT VFR BLD AUTO: 43.5 % (ref 37–47)
HGB BLD-MCNC: 13.6 G/DL (ref 12–16)
IMM GRANULOCYTES # BLD AUTO: 0.02 K/UL (ref 0–0.11)
IMM GRANULOCYTES NFR BLD AUTO: 0.2 % (ref 0–0.9)
LYMPHOCYTES # BLD AUTO: 3.27 K/UL (ref 1–4.8)
LYMPHOCYTES NFR BLD: 37.5 % (ref 22–41)
MCH RBC QN AUTO: 27.9 PG (ref 27–33)
MCHC RBC AUTO-ENTMCNC: 31.3 G/DL (ref 33.6–35)
MCV RBC AUTO: 89.3 FL (ref 81.4–97.8)
MONOCYTES # BLD AUTO: 0.67 K/UL (ref 0–0.85)
MONOCYTES NFR BLD AUTO: 7.7 % (ref 0–13.4)
NEUTROPHILS # BLD AUTO: 4.65 K/UL (ref 2–7.15)
NEUTROPHILS NFR BLD: 53.2 % (ref 44–72)
NRBC # BLD AUTO: 0 K/UL
NRBC BLD-RTO: 0 /100 WBC
PLATELET # BLD AUTO: 306 K/UL (ref 164–446)
PMV BLD AUTO: 11.3 FL (ref 9–12.9)
RBC # BLD AUTO: 4.87 M/UL (ref 4.2–5.4)
TSH SERPL DL<=0.005 MIU/L-ACNC: 2.76 UIU/ML (ref 0.38–5.33)
WBC # BLD AUTO: 8.7 K/UL (ref 4.8–10.8)

## 2021-03-18 PROCEDURE — 87624 HPV HI-RISK TYP POOLED RSLT: CPT

## 2021-03-18 PROCEDURE — 87491 CHLMYD TRACH DNA AMP PROBE: CPT

## 2021-03-18 PROCEDURE — 87591 N.GONORRHOEAE DNA AMP PROB: CPT

## 2021-03-18 PROCEDURE — 84443 ASSAY THYROID STIM HORMONE: CPT

## 2021-03-18 PROCEDURE — 88175 CYTOPATH C/V AUTO FLUID REDO: CPT

## 2021-03-18 PROCEDURE — 36415 COLL VENOUS BLD VENIPUNCTURE: CPT

## 2021-03-18 PROCEDURE — 85025 COMPLETE CBC W/AUTO DIFF WBC: CPT

## 2021-03-19 LAB
C TRACH DNA GENITAL QL NAA+PROBE: NEGATIVE
CYTOLOGY REG CYTOL: NORMAL
HPV HR 12 DNA CVX QL NAA+PROBE: NEGATIVE
HPV16 DNA SPEC QL NAA+PROBE: NEGATIVE
HPV18 DNA SPEC QL NAA+PROBE: NEGATIVE
N GONORRHOEA DNA GENITAL QL NAA+PROBE: NEGATIVE
SPECIMEN SOURCE: NORMAL
SPECIMEN SOURCE: NORMAL

## 2021-06-16 RX ORDER — GALCANEZUMAB 120 MG/ML
120 INJECTION, SOLUTION SUBCUTANEOUS
Qty: 3 EACH | Refills: 0 | Status: SHIPPED | OUTPATIENT
Start: 2021-06-16 | End: 2021-08-11 | Stop reason: SDUPTHER

## 2021-06-16 RX ORDER — HYDROCODONE BITARTRATE AND ACETAMINOPHEN 5; 300 MG/1; MG/1
TABLET ORAL
Qty: 30 TABLET | Refills: 0 | Status: SHIPPED | OUTPATIENT
Start: 2021-06-16 | End: 2021-07-17

## 2021-06-16 NOTE — TELEPHONE ENCOUNTER
"Received request via: Pharmacy    Was the patient seen in the last year in this department? Yes    Does the patient have an active prescription (recently filled or refills available) for medication(s) requested? No       \"Cheng Mckeon! I was wondering if I could get a refill for my Vicodin? I know you are leaving soon. Also I want to make sure my prescription for my Emgality is still good?\"      "

## 2021-06-21 ENCOUNTER — TELEPHONE (OUTPATIENT)
Dept: NEUROLOGY | Facility: MEDICAL CENTER | Age: 36
End: 2021-06-21

## 2021-06-21 RX ORDER — HYDROCODONE BITARTRATE AND ACETAMINOPHEN 5; 325 MG/1; MG/1
1-2 TABLET ORAL EVERY 6 HOURS PRN
Qty: 30 TABLET | Refills: 0 | Status: SHIPPED | OUTPATIENT
Start: 2021-06-21 | End: 2021-07-21

## 2021-06-21 NOTE — TELEPHONE ENCOUNTER
Pharmacy called state that they are not able to get the the hydrocodone-acetaminophen 5-300 mg.    They are asking if patient can have the hydrocodone-acetaminophen 5-325 mg instead. If yes if could you can please send a new rx.     Please advise.

## 2021-08-04 DIAGNOSIS — F43.21 GRIEVING: ICD-10-CM

## 2021-08-10 RX ORDER — TOPIRAMATE 100 MG/1
TABLET, FILM COATED ORAL
Qty: 45 TABLET | Refills: 2 | Status: SHIPPED
Start: 2021-08-10 | End: 2021-11-17

## 2021-08-10 NOTE — TELEPHONE ENCOUNTER
Received request via: Pharmacy    Was the patient seen in the last year in this department? Yes    Does the patient have an active prescription (recently filled or refills available) for medication(s) requested? No     Last seen by Linda Du 01/2021   Next appt not scheduled. Pt notified that appt is need for further refills.

## 2021-08-11 RX ORDER — GALCANEZUMAB 120 MG/ML
120 INJECTION, SOLUTION SUBCUTANEOUS
Qty: 3 EACH | Refills: 0 | Status: SHIPPED | OUTPATIENT
Start: 2021-08-11 | End: 2022-08-11

## 2021-09-30 ENCOUNTER — IMMUNIZATION (OUTPATIENT)
Dept: OCCUPATIONAL MEDICINE | Facility: CLINIC | Age: 36
End: 2021-09-30
Payer: COMMERCIAL

## 2021-09-30 DIAGNOSIS — Z23 ENCOUNTER FOR VACCINATION: Primary | ICD-10-CM

## 2021-09-30 DIAGNOSIS — Z23 NEED FOR VACCINATION: Primary | ICD-10-CM

## 2021-09-30 PROCEDURE — 91300 PFIZER SARS-COV-2 VACCINE: CPT | Performed by: INTERNAL MEDICINE

## 2021-09-30 PROCEDURE — 0003A PFIZER SARS-COV-2 VACCINE: CPT | Performed by: INTERNAL MEDICINE

## 2021-09-30 PROCEDURE — 90686 IIV4 VACC NO PRSV 0.5 ML IM: CPT | Performed by: NURSE PRACTITIONER

## 2021-11-01 ENCOUNTER — OFFICE VISIT (OUTPATIENT)
Dept: URGENT CARE | Facility: PHYSICIAN GROUP | Age: 36
End: 2021-11-01
Payer: COMMERCIAL

## 2021-11-01 VITALS
RESPIRATION RATE: 20 BRPM | OXYGEN SATURATION: 94 % | DIASTOLIC BLOOD PRESSURE: 88 MMHG | SYSTOLIC BLOOD PRESSURE: 134 MMHG | HEIGHT: 70 IN | WEIGHT: 280 LBS | TEMPERATURE: 97.6 F | HEART RATE: 90 BPM | BODY MASS INDEX: 40.09 KG/M2

## 2021-11-01 DIAGNOSIS — J01.90 ACUTE NON-RECURRENT SINUSITIS, UNSPECIFIED LOCATION: ICD-10-CM

## 2021-11-01 PROCEDURE — 99213 OFFICE O/P EST LOW 20 MIN: CPT | Performed by: FAMILY MEDICINE

## 2021-11-01 RX ORDER — AMOXICILLIN AND CLAVULANATE POTASSIUM 875; 125 MG/1; MG/1
1 TABLET, FILM COATED ORAL 2 TIMES DAILY
Qty: 14 TABLET | Refills: 0 | Status: SHIPPED | OUTPATIENT
Start: 2021-11-01 | End: 2021-11-08

## 2021-11-01 NOTE — PROGRESS NOTES
"  Subjective:      36 y.o. female presents to urgent care for cold symptoms that started approximately two weeks ago. She is experiencing sore throat, increased facial pressure, and bilateral ear pressure. She denies any cough, fever, diarrhea, vomiting, body aches, metallic tastes, or tooth pain. She has tried Dayquil, mucinex, Tylenol, and Iburpofen, nothing is really improving her symptoms. She denies any tobacco product use. No history of asthma or COPD. She is fully vaccinated against COVID. No known sick contacts.     She denies any other questions or concerns at this time.    Current problem list, medication, and past medical/surgical history were reviewed in Epic.    ROS  See HPI     Objective:      /88 (BP Location: Right arm, Patient Position: Sitting, BP Cuff Size: Adult)   Pulse 90   Temp 36.4 °C (97.6 °F) (Temporal)   Resp 20   Ht 1.778 m (5' 10\")   Wt (!) 127 kg (280 lb)   LMP 10/15/2021 (Approximate)   SpO2 94%   Breastfeeding No   BMI 40.18 kg/m²     Physical Exam  Constitutional:       General: She is not in acute distress.     Appearance: She is not diaphoretic.   HENT:      Right Ear: Tympanic membrane, ear canal and external ear normal.      Left Ear: Tympanic membrane, ear canal and external ear normal.      Nose:      Right Sinus: Maxillary sinus tenderness and frontal sinus tenderness present.      Left Sinus: Maxillary sinus tenderness and frontal sinus tenderness present.      Mouth/Throat:      Palate: No lesions.      Pharynx: Oropharynx is clear. Uvula midline. Posterior oropharyngeal erythema present.      Tonsils: No tonsillar exudate. 1+ on the right. 1+ on the left.   Cardiovascular:      Rate and Rhythm: Normal rate and regular rhythm.      Heart sounds: Normal heart sounds.   Pulmonary:      Effort: Pulmonary effort is normal. No respiratory distress.      Breath sounds: Normal breath sounds.   Neurological:      Mental Status: She is alert.   Psychiatric:         " Mood and Affect: Affect normal.         Judgment: Judgment normal.       Assessment/Plan:     1. Acute non-recurrent sinusitis, unspecified location  Symptoms have been present for greater than 14 days, without any improvement.  Therefore likely to be bacterial in origin.  Prescription has been sent for sinusitis.  - amoxicillin-clavulanate (AUGMENTIN) 875-125 MG Tab; Take 1 Tablet by mouth 2 times a day for 7 days.  Dispense: 14 Tablet; Refill: 0      Instructed to return to Urgent Care or nearest Emergency Department if symptoms fail to improve, for any change in condition, further concerns, or new concerning symptoms. Patient states understanding of the plan of care and discharge instructions.    Chiquita Ludwig M.D.

## 2021-11-17 ENCOUNTER — OFFICE VISIT (OUTPATIENT)
Dept: MEDICAL GROUP | Facility: PHYSICIAN GROUP | Age: 36
End: 2021-11-17
Payer: COMMERCIAL

## 2021-11-17 VITALS
HEIGHT: 69 IN | OXYGEN SATURATION: 99 % | WEIGHT: 286 LBS | RESPIRATION RATE: 16 BRPM | SYSTOLIC BLOOD PRESSURE: 138 MMHG | HEART RATE: 78 BPM | TEMPERATURE: 97.6 F | DIASTOLIC BLOOD PRESSURE: 96 MMHG | BODY MASS INDEX: 42.36 KG/M2

## 2021-11-17 DIAGNOSIS — R53.83 FATIGUE, UNSPECIFIED TYPE: ICD-10-CM

## 2021-11-17 DIAGNOSIS — Z23 NEED FOR VACCINATION: ICD-10-CM

## 2021-11-17 DIAGNOSIS — N93.9 ABNORMAL VAGINAL BLEEDING: ICD-10-CM

## 2021-11-17 DIAGNOSIS — J01.00 ACUTE MAXILLARY SINUSITIS, RECURRENCE NOT SPECIFIED: Primary | ICD-10-CM

## 2021-11-17 PROCEDURE — 99214 OFFICE O/P EST MOD 30 MIN: CPT | Mod: 25 | Performed by: NURSE PRACTITIONER

## 2021-11-17 PROCEDURE — 90471 IMMUNIZATION ADMIN: CPT | Performed by: NURSE PRACTITIONER

## 2021-11-17 PROCEDURE — 90715 TDAP VACCINE 7 YRS/> IM: CPT | Performed by: NURSE PRACTITIONER

## 2021-11-17 RX ORDER — METHYLPREDNISOLONE 4 MG/1
TABLET ORAL
Qty: 21 TABLET | Refills: 0 | Status: ON HOLD | OUTPATIENT
Start: 2021-11-17 | End: 2022-02-10

## 2021-11-17 RX ORDER — DOXYCYCLINE HYCLATE 100 MG
100 TABLET ORAL 2 TIMES DAILY
Qty: 14 TABLET | Refills: 0 | Status: ON HOLD | OUTPATIENT
Start: 2021-11-17 | End: 2022-02-10

## 2021-11-23 ENCOUNTER — HOSPITAL ENCOUNTER (OUTPATIENT)
Dept: RADIOLOGY | Facility: MEDICAL CENTER | Age: 36
End: 2021-11-23
Attending: NURSE PRACTITIONER
Payer: COMMERCIAL

## 2021-11-23 DIAGNOSIS — N93.9 ABNORMAL VAGINAL BLEEDING: ICD-10-CM

## 2021-11-23 PROCEDURE — 76830 TRANSVAGINAL US NON-OB: CPT

## 2021-11-24 NOTE — PROGRESS NOTES
Subjective:     CC: Sinus infection, fatigue, abnormal vaginal bleeding    HPI:   Nataliia presents today to establish care with me. She is an established patient with [facility], but her provider is relocating.  Her past medical, family, social and surgical history were reviewed today. She reports a sinus infection for about five weeks that is persistent despite a course of antibiotics.  She also has some abnormal vaginal bleeding that she was supposed to have a workup with GYN about, but the order was inadvertently not placed.      Past Medical History:   Diagnosis Date   • Abnormal Pap smear    • Anxiety    • Depression 10/19/2009   • Hypertension    • Migraine    • Tension headache        Social History     Tobacco Use   • Smoking status: Never Smoker   • Smokeless tobacco: Never Used   Vaping Use   • Vaping Use: Never used   Substance Use Topics   • Alcohol use: No     Alcohol/week: 0.0 oz   • Drug use: No       Current Outpatient Medications Ordered in Epic   Medication Sig Dispense Refill   • HYDROcodone-Acetaminophen (VICODIN PO) Take 375 mg by mouth.     • doxycycline (VIBRAMYCIN) 100 MG Tab Take 1 Tablet by mouth 2 times a day. 14 Tablet 0   • methylPREDNISolone (MEDROL DOSEPAK) 4 MG Tablet Therapy Pack As directed on the packaging label. 21 Tablet 0   • Galcanezumab-gnlm (EMGALITY) 120 MG/ML Solution Auto-injector Inject 120 mg under the skin every 30 days. 3 Each 0   • fluoxetine (PROZAC) 40 MG capsule Take 1 Cap by mouth every day. 90 Cap 3   • ketorolac (TORADOL) 10 MG Tab TAKE ONE TABLET BY MOUTH EVERY 6 HOURS AS NEEDED 3 Tab 2   • sumatriptan (IMITREX) 100 MG tablet Take 1/2-1 tablet po at onset of headache, may repeat dose in 2 hours if unrelieved.  Do not exceed more than 2 tablets in 24 hours. 9 Tab 5     No current Epic-ordered facility-administered medications on file.       Allergies:  Nkda [no known drug allergy]    Health Maintenance: Completed    ROS:  Gen: no fevers/chills, no  "changes in weight  Eyes: no changes in vision  ENT: no sore throat, no hearing loss, no bloody nose  Pulm: no sob, no cough  CV: no chest pain, no palpitations  GI: no nausea/vomiting, no diarrhea  : no dysuria  MSk: no myalgias  Skin: no rash  Neuro: no headaches, no numbness/tingling  Heme/Lymph: no easy bruising      Objective:       Exam:  /96 (BP Location: Left arm, Patient Position: Sitting, BP Cuff Size: Adult long)   Pulse 78   Temp 36.4 °C (97.6 °F)   Resp 16   Ht 1.753 m (5' 9\")   Wt (!) 130 kg (286 lb)   LMP 11/08/2021   SpO2 99%   BMI 42.23 kg/m²  Body mass index is 42.23 kg/m².    Gen: Alert and oriented, No apparent distress.  HEENT:  NCAT, PERRLA.  Diffuse tenderness over maxillary sinuses.  Oropharynx without erythema or edema.    Neck: Neck is supple without lymphadenopathy.   Lungs: Normal effort, CTA bilaterally, no wheezes, rhonchi, or rales  CV: Regular rate and rhythm. No murmurs, rubs, or gallops.  Ext: No clubbing, cyanosis, edema.    Labs: None.    Assessment & Plan:     36 y.o. female with the following -     1. Acute maxillary sinusitis, recurrence not specified  Acute condition. Patient reports that she has had a sinus infection for over five weeks. She was seen in urgent care on 11/1/2021 and given a course of antibiotics.  She is still symptomatic with sinus pain, pressure and headaches despite the course of antibiotics. She does have sinus drainage. She denies fever or chills.  She reports significant fatigue associated with this infection.   - doxycycline (VIBRAMYCIN) 100 MG Tab; Take 1 Tablet by mouth 2 times a day.  Dispense: 14 Tablet; Refill: 0  - methylPREDNISolone (MEDROL DOSEPAK) 4 MG Tablet Therapy Pack; As directed on the packaging label.  Dispense: 21 Tablet; Refill: 0    2. Fatigue, unspecified type  - ASHLEE REFLEXIVE PROFILE; Future  - Sed Rate; Future  - CRP QUANTITIVE (NON-CARDIAC); Future  - TSH WITH REFLEX TO FT4; Future  - VITAMIN B12; Future  - " FERRITIN; Future  - IRON/TOTAL IRON BIND; Future  - CBC WITHOUT DIFFERENTIAL; Future    3. Abnormal vaginal bleeding  Abnormal vaginal bleeding  Acute condition.  Patient states that she has had abnormal vaginal bleeding for eight months with clotting after her periods. Her periods are heavier than normal. Her period stops and then the clots begin four to five days later, and those last about 1 to 2 days.  She was supposed to have a transvaginal ultrasound with her GYN provider, but the order did not get placed.  She reports she does have pain with these episodes.  Discussed with patient that I will order the ultrasound and have her follow up with her GYN provider for review.  She is in agreement with this plan.   - US-PELVIC COMPLETE (TRANSABDOMINAL/TRANSVAGINAL) (COMBO); Future    4. Need for vaccination  - Tdap Vaccine =>8YO IM      Return in about 4 weeks (around 12/15/2021), or if symptoms worsen or fail to improve.    I have placed the below orders and discussed them with an approved delegating provider.  The MA is performing the below orders under the direction of Angelina Reyes MD.    Please note that this dictation was created using voice recognition software. I have made every reasonable attempt to correct obvious errors, but I expect that there are errors of grammar and possibly content that I did not discover before finalizing the note.

## 2021-12-06 ENCOUNTER — HOSPITAL ENCOUNTER (OUTPATIENT)
Dept: LAB | Facility: MEDICAL CENTER | Age: 36
End: 2021-12-06
Attending: NURSE PRACTITIONER
Payer: COMMERCIAL

## 2021-12-06 DIAGNOSIS — R53.83 FATIGUE, UNSPECIFIED TYPE: ICD-10-CM

## 2021-12-06 LAB
CRP SERPL HS-MCNC: 3.07 MG/DL (ref 0–0.75)
ERYTHROCYTE [DISTWIDTH] IN BLOOD BY AUTOMATED COUNT: 41.6 FL (ref 35.9–50)
ERYTHROCYTE [SEDIMENTATION RATE] IN BLOOD BY WESTERGREN METHOD: 29 MM/HOUR (ref 0–25)
FERRITIN SERPL-MCNC: 114 NG/ML (ref 10–291)
HCT VFR BLD AUTO: 43.1 % (ref 37–47)
HGB BLD-MCNC: 14.2 G/DL (ref 12–16)
IRON SATN MFR SERPL: 30 % (ref 15–55)
IRON SERPL-MCNC: 86 UG/DL (ref 40–170)
MCH RBC QN AUTO: 30 PG (ref 27–33)
MCHC RBC AUTO-ENTMCNC: 32.9 G/DL (ref 33.6–35)
MCV RBC AUTO: 90.9 FL (ref 81.4–97.8)
PLATELET # BLD AUTO: 268 K/UL (ref 164–446)
PMV BLD AUTO: 10.5 FL (ref 9–12.9)
RBC # BLD AUTO: 4.74 M/UL (ref 4.2–5.4)
TIBC SERPL-MCNC: 285 UG/DL (ref 250–450)
TSH SERPL DL<=0.005 MIU/L-ACNC: 2.31 UIU/ML (ref 0.38–5.33)
UIBC SERPL-MCNC: 199 UG/DL (ref 110–370)
VIT B12 SERPL-MCNC: 495 PG/ML (ref 211–911)
WBC # BLD AUTO: 7.8 K/UL (ref 4.8–10.8)

## 2021-12-06 PROCEDURE — 36415 COLL VENOUS BLD VENIPUNCTURE: CPT

## 2021-12-06 PROCEDURE — 86038 ANTINUCLEAR ANTIBODIES: CPT

## 2021-12-06 PROCEDURE — 85652 RBC SED RATE AUTOMATED: CPT

## 2021-12-06 PROCEDURE — 82607 VITAMIN B-12: CPT

## 2021-12-06 PROCEDURE — 84443 ASSAY THYROID STIM HORMONE: CPT

## 2021-12-06 PROCEDURE — 83540 ASSAY OF IRON: CPT

## 2021-12-06 PROCEDURE — 83550 IRON BINDING TEST: CPT

## 2021-12-06 PROCEDURE — 86140 C-REACTIVE PROTEIN: CPT

## 2021-12-06 PROCEDURE — 85027 COMPLETE CBC AUTOMATED: CPT

## 2021-12-06 PROCEDURE — 82728 ASSAY OF FERRITIN: CPT

## 2021-12-08 DIAGNOSIS — J01.01 ACUTE RECURRENT MAXILLARY SINUSITIS: ICD-10-CM

## 2021-12-10 DIAGNOSIS — R53.83 FATIGUE, UNSPECIFIED TYPE: ICD-10-CM

## 2021-12-10 LAB — NUCLEAR IGG SER QL IA: NORMAL

## 2021-12-13 ENCOUNTER — HOSPITAL ENCOUNTER (OUTPATIENT)
Dept: LAB | Facility: MEDICAL CENTER | Age: 36
End: 2021-12-13
Attending: NURSE PRACTITIONER
Payer: COMMERCIAL

## 2021-12-13 DIAGNOSIS — R53.83 FATIGUE, UNSPECIFIED TYPE: ICD-10-CM

## 2021-12-13 LAB
ALBUMIN SERPL BCP-MCNC: 3.8 G/DL (ref 3.2–4.9)
ALBUMIN/GLOB SERPL: 1.2 G/DL
ALP SERPL-CCNC: 107 U/L (ref 30–99)
ALT SERPL-CCNC: 25 U/L (ref 2–50)
ANION GAP SERPL CALC-SCNC: 14 MMOL/L (ref 7–16)
AST SERPL-CCNC: 17 U/L (ref 12–45)
BILIRUB SERPL-MCNC: 0.4 MG/DL (ref 0.1–1.5)
BUN SERPL-MCNC: 17 MG/DL (ref 8–22)
CALCIUM SERPL-MCNC: 8.8 MG/DL (ref 8.5–10.5)
CHLORIDE SERPL-SCNC: 105 MMOL/L (ref 96–112)
CO2 SERPL-SCNC: 21 MMOL/L (ref 20–33)
CREAT SERPL-MCNC: 0.6 MG/DL (ref 0.5–1.4)
FASTING STATUS PATIENT QL REPORTED: NORMAL
GLOBULIN SER CALC-MCNC: 3.3 G/DL (ref 1.9–3.5)
GLUCOSE SERPL-MCNC: 91 MG/DL (ref 65–99)
POTASSIUM SERPL-SCNC: 4.1 MMOL/L (ref 3.6–5.5)
PROT SERPL-MCNC: 7.1 G/DL (ref 6–8.2)
SODIUM SERPL-SCNC: 140 MMOL/L (ref 135–145)

## 2021-12-13 PROCEDURE — 86664 EPSTEIN-BARR NUCLEAR ANTIGEN: CPT

## 2021-12-13 PROCEDURE — 82306 VITAMIN D 25 HYDROXY: CPT

## 2021-12-13 PROCEDURE — 86663 EPSTEIN-BARR ANTIBODY: CPT

## 2021-12-13 PROCEDURE — 80053 COMPREHEN METABOLIC PANEL: CPT

## 2021-12-13 PROCEDURE — 86665 EPSTEIN-BARR CAPSID VCA: CPT

## 2021-12-13 PROCEDURE — 36415 COLL VENOUS BLD VENIPUNCTURE: CPT

## 2021-12-17 LAB
25(OH)D3 SERPL-MCNC: 12 NG/ML (ref 30–80)
EBV EA-D IGG SER-ACNC: <5 U/ML (ref 0–10.9)
EBV NA IGG SER IA-ACNC: <3 U/ML (ref 0–21.9)
EBV VCA IGG SER IA-ACNC: 428 U/ML (ref 0–21.9)
EBV VCA IGM SER IA-ACNC: <10 U/ML (ref 0–43.9)

## 2021-12-21 DIAGNOSIS — R79.89 LOW VITAMIN D LEVEL: ICD-10-CM

## 2021-12-21 DIAGNOSIS — R74.8 ELEVATED ALKALINE PHOSPHATASE LEVEL: ICD-10-CM

## 2021-12-21 RX ORDER — ERGOCALCIFEROL 1.25 MG/1
50000 CAPSULE ORAL
Qty: 8 CAPSULE | Refills: 0 | Status: SHIPPED | OUTPATIENT
Start: 2021-12-21 | End: 2022-09-27

## 2022-02-02 ENCOUNTER — HOSPITAL ENCOUNTER (OUTPATIENT)
Facility: MEDICAL CENTER | Age: 37
End: 2022-02-02
Attending: OTOLARYNGOLOGY | Admitting: OTOLARYNGOLOGY
Payer: COMMERCIAL

## 2022-02-03 DIAGNOSIS — F33.41 RECURRENT MAJOR DEPRESSIVE DISORDER, IN PARTIAL REMISSION (HCC): ICD-10-CM

## 2022-02-03 RX ORDER — FLUOXETINE HYDROCHLORIDE 40 MG/1
CAPSULE ORAL
Qty: 90 CAPSULE | Refills: 3 | Status: SHIPPED | OUTPATIENT
Start: 2022-02-03 | End: 2022-10-28 | Stop reason: SDUPTHER

## 2022-02-08 ENCOUNTER — PRE-ADMISSION TESTING (OUTPATIENT)
Dept: ADMISSIONS | Facility: MEDICAL CENTER | Age: 37
End: 2022-02-08
Attending: OTOLARYNGOLOGY
Payer: COMMERCIAL

## 2022-02-08 VITALS — HEIGHT: 69 IN | WEIGHT: 278 LBS | BODY MASS INDEX: 41.18 KG/M2

## 2022-02-08 RX ORDER — HYDROCODONE BITARTRATE AND ACETAMINOPHEN 5; 325 MG/1; MG/1
1 TABLET ORAL EVERY 4 HOURS PRN
COMMUNITY
End: 2022-12-20

## 2022-02-08 ASSESSMENT — FIBROSIS 4 INDEX: FIB4 SCORE: 0.43

## 2022-02-09 ENCOUNTER — PRE-ADMISSION TESTING (OUTPATIENT)
Dept: ADMISSIONS | Facility: MEDICAL CENTER | Age: 37
End: 2022-02-09
Attending: OTOLARYNGOLOGY
Payer: COMMERCIAL

## 2022-02-09 ENCOUNTER — HOSPITAL ENCOUNTER (OUTPATIENT)
Dept: RADIOLOGY | Facility: MEDICAL CENTER | Age: 37
End: 2022-02-09

## 2022-02-09 ENCOUNTER — ANESTHESIA EVENT (OUTPATIENT)
Dept: SURGERY | Facility: MEDICAL CENTER | Age: 37
End: 2022-02-09
Payer: COMMERCIAL

## 2022-02-09 DIAGNOSIS — Z01.812 PRE-OPERATIVE LABORATORY EXAMINATION: ICD-10-CM

## 2022-02-09 LAB
HCG SERPL QL: NEGATIVE
SARS-COV+SARS-COV-2 AG RESP QL IA.RAPID: NOTDETECTED
SPECIMEN SOURCE: NORMAL

## 2022-02-09 PROCEDURE — 84703 CHORIONIC GONADOTROPIN ASSAY: CPT

## 2022-02-09 PROCEDURE — 87426 SARSCOV CORONAVIRUS AG IA: CPT

## 2022-02-09 PROCEDURE — 36415 COLL VENOUS BLD VENIPUNCTURE: CPT

## 2022-02-10 ENCOUNTER — ANESTHESIA (OUTPATIENT)
Dept: SURGERY | Facility: MEDICAL CENTER | Age: 37
End: 2022-02-10
Payer: COMMERCIAL

## 2022-02-10 ENCOUNTER — HOSPITAL ENCOUNTER (OUTPATIENT)
Facility: MEDICAL CENTER | Age: 37
End: 2022-02-10
Attending: OTOLARYNGOLOGY | Admitting: OTOLARYNGOLOGY
Payer: COMMERCIAL

## 2022-02-10 VITALS
HEIGHT: 69 IN | OXYGEN SATURATION: 93 % | SYSTOLIC BLOOD PRESSURE: 128 MMHG | WEIGHT: 282.63 LBS | DIASTOLIC BLOOD PRESSURE: 82 MMHG | TEMPERATURE: 97.3 F | HEART RATE: 77 BPM | RESPIRATION RATE: 18 BRPM | BODY MASS INDEX: 41.86 KG/M2

## 2022-02-10 DIAGNOSIS — J32.0 CHRONIC RIGHT MAXILLARY SINUSITIS: ICD-10-CM

## 2022-02-10 LAB
GRAM STN SPEC: NORMAL
PATHOLOGY CONSULT NOTE: NORMAL
SIGNIFICANT IND 70042: NORMAL
SITE SITE: NORMAL
SOURCE SOURCE: NORMAL

## 2022-02-10 PROCEDURE — 501838 HCHG SUTURE GENERAL: Performed by: OTOLARYNGOLOGY

## 2022-02-10 PROCEDURE — 700102 HCHG RX REV CODE 250 W/ 637 OVERRIDE(OP): Performed by: OTOLARYNGOLOGY

## 2022-02-10 PROCEDURE — 87075 CULTR BACTERIA EXCEPT BLOOD: CPT

## 2022-02-10 PROCEDURE — 88311 DECALCIFY TISSUE: CPT

## 2022-02-10 PROCEDURE — 160035 HCHG PACU - 1ST 60 MINS PHASE I: Performed by: OTOLARYNGOLOGY

## 2022-02-10 PROCEDURE — A9270 NON-COVERED ITEM OR SERVICE: HCPCS | Performed by: ANESTHESIOLOGY

## 2022-02-10 PROCEDURE — 160048 HCHG OR STATISTICAL LEVEL 1-5: Performed by: OTOLARYNGOLOGY

## 2022-02-10 PROCEDURE — A9270 NON-COVERED ITEM OR SERVICE: HCPCS | Performed by: OTOLARYNGOLOGY

## 2022-02-10 PROCEDURE — 87186 SC STD MICRODIL/AGAR DIL: CPT

## 2022-02-10 PROCEDURE — 700111 HCHG RX REV CODE 636 W/ 250 OVERRIDE (IP): Performed by: OTOLARYNGOLOGY

## 2022-02-10 PROCEDURE — 700101 HCHG RX REV CODE 250: Performed by: ANESTHESIOLOGY

## 2022-02-10 PROCEDURE — 160046 HCHG PACU - 1ST 60 MINS PHASE II: Performed by: OTOLARYNGOLOGY

## 2022-02-10 PROCEDURE — 700105 HCHG RX REV CODE 258: Performed by: OTOLARYNGOLOGY

## 2022-02-10 PROCEDURE — 700102 HCHG RX REV CODE 250 W/ 637 OVERRIDE(OP): Performed by: ANESTHESIOLOGY

## 2022-02-10 PROCEDURE — 160036 HCHG PACU - EA ADDL 30 MINS PHASE I: Performed by: OTOLARYNGOLOGY

## 2022-02-10 PROCEDURE — 700111 HCHG RX REV CODE 636 W/ 250 OVERRIDE (IP): Performed by: ANESTHESIOLOGY

## 2022-02-10 PROCEDURE — 160025 RECOVERY II MINUTES (STATS): Performed by: OTOLARYNGOLOGY

## 2022-02-10 PROCEDURE — 160041 HCHG SURGERY MINUTES - EA ADDL 1 MIN LEVEL 4: Performed by: OTOLARYNGOLOGY

## 2022-02-10 PROCEDURE — 88305 TISSUE EXAM BY PATHOLOGIST: CPT

## 2022-02-10 PROCEDURE — 700101 HCHG RX REV CODE 250: Performed by: OTOLARYNGOLOGY

## 2022-02-10 PROCEDURE — 160002 HCHG RECOVERY MINUTES (STAT): Performed by: OTOLARYNGOLOGY

## 2022-02-10 PROCEDURE — 160029 HCHG SURGERY MINUTES - 1ST 30 MINS LEVEL 4: Performed by: OTOLARYNGOLOGY

## 2022-02-10 PROCEDURE — 87070 CULTURE OTHR SPECIMN AEROBIC: CPT

## 2022-02-10 PROCEDURE — 87102 FUNGUS ISOLATION CULTURE: CPT

## 2022-02-10 PROCEDURE — 160009 HCHG ANES TIME/MIN: Performed by: OTOLARYNGOLOGY

## 2022-02-10 PROCEDURE — 87205 SMEAR GRAM STAIN: CPT

## 2022-02-10 PROCEDURE — 87077 CULTURE AEROBIC IDENTIFY: CPT

## 2022-02-10 RX ORDER — ONDANSETRON 2 MG/ML
INJECTION INTRAMUSCULAR; INTRAVENOUS PRN
Status: DISCONTINUED | OUTPATIENT
Start: 2022-02-10 | End: 2022-02-10 | Stop reason: SURG

## 2022-02-10 RX ORDER — BACITRACIN ZINC 500 [USP'U]/G
OINTMENT TOPICAL
Status: DISCONTINUED
Start: 2022-02-10 | End: 2022-02-10 | Stop reason: HOSPADM

## 2022-02-10 RX ORDER — OXYMETAZOLINE HYDROCHLORIDE 0.05 G/100ML
SPRAY NASAL
Status: DISCONTINUED
Start: 2022-02-10 | End: 2022-02-10 | Stop reason: HOSPADM

## 2022-02-10 RX ORDER — CEFAZOLIN SODIUM 1 G/3ML
INJECTION, POWDER, FOR SOLUTION INTRAMUSCULAR; INTRAVENOUS PRN
Status: DISCONTINUED | OUTPATIENT
Start: 2022-02-10 | End: 2022-02-10 | Stop reason: SURG

## 2022-02-10 RX ORDER — MEPERIDINE HYDROCHLORIDE 25 MG/ML
6.25 INJECTION INTRAMUSCULAR; INTRAVENOUS; SUBCUTANEOUS
Status: DISCONTINUED | OUTPATIENT
Start: 2022-02-10 | End: 2022-02-10 | Stop reason: HOSPADM

## 2022-02-10 RX ORDER — DIPHENHYDRAMINE HYDROCHLORIDE 50 MG/ML
12.5 INJECTION INTRAMUSCULAR; INTRAVENOUS
Status: DISCONTINUED | OUTPATIENT
Start: 2022-02-10 | End: 2022-02-10 | Stop reason: HOSPADM

## 2022-02-10 RX ORDER — HYDROMORPHONE HYDROCHLORIDE 2 MG/ML
INJECTION, SOLUTION INTRAMUSCULAR; INTRAVENOUS; SUBCUTANEOUS PRN
Status: DISCONTINUED | OUTPATIENT
Start: 2022-02-10 | End: 2022-02-10 | Stop reason: SURG

## 2022-02-10 RX ORDER — SODIUM CHLORIDE, SODIUM LACTATE, POTASSIUM CHLORIDE, CALCIUM CHLORIDE 600; 310; 30; 20 MG/100ML; MG/100ML; MG/100ML; MG/100ML
INJECTION, SOLUTION INTRAVENOUS CONTINUOUS
Status: DISCONTINUED | OUTPATIENT
Start: 2022-02-10 | End: 2022-02-10 | Stop reason: HOSPADM

## 2022-02-10 RX ORDER — EPINEPHRINE 1 MG/ML(1)
AMPUL (ML) INJECTION
Status: DISCONTINUED | OUTPATIENT
Start: 2022-02-10 | End: 2022-02-10 | Stop reason: HOSPADM

## 2022-02-10 RX ORDER — METOCLOPRAMIDE HYDROCHLORIDE 5 MG/ML
INJECTION INTRAMUSCULAR; INTRAVENOUS PRN
Status: DISCONTINUED | OUTPATIENT
Start: 2022-02-10 | End: 2022-02-10 | Stop reason: SURG

## 2022-02-10 RX ORDER — HALOPERIDOL 5 MG/ML
1 INJECTION INTRAMUSCULAR
Status: DISCONTINUED | OUTPATIENT
Start: 2022-02-10 | End: 2022-02-10 | Stop reason: HOSPADM

## 2022-02-10 RX ORDER — HYDROMORPHONE HYDROCHLORIDE 1 MG/ML
0.4 INJECTION, SOLUTION INTRAMUSCULAR; INTRAVENOUS; SUBCUTANEOUS
Status: DISCONTINUED | OUTPATIENT
Start: 2022-02-10 | End: 2022-02-10 | Stop reason: HOSPADM

## 2022-02-10 RX ORDER — SCOLOPAMINE TRANSDERMAL SYSTEM 1 MG/1
1 PATCH, EXTENDED RELEASE TRANSDERMAL
Status: CANCELLED | OUTPATIENT
Start: 2022-02-10

## 2022-02-10 RX ORDER — LIDOCAINE HYDROCHLORIDE AND EPINEPHRINE 10; 10 MG/ML; UG/ML
INJECTION, SOLUTION INFILTRATION; PERINEURAL
Status: DISCONTINUED | OUTPATIENT
Start: 2022-02-10 | End: 2022-02-10 | Stop reason: HOSPADM

## 2022-02-10 RX ORDER — MIDAZOLAM HYDROCHLORIDE 1 MG/ML
INJECTION INTRAMUSCULAR; INTRAVENOUS PRN
Status: DISCONTINUED | OUTPATIENT
Start: 2022-02-10 | End: 2022-02-10 | Stop reason: SURG

## 2022-02-10 RX ORDER — HYDROMORPHONE HYDROCHLORIDE 1 MG/ML
0.2 INJECTION, SOLUTION INTRAMUSCULAR; INTRAVENOUS; SUBCUTANEOUS
Status: DISCONTINUED | OUTPATIENT
Start: 2022-02-10 | End: 2022-02-10 | Stop reason: HOSPADM

## 2022-02-10 RX ORDER — CELECOXIB 200 MG/1
200 CAPSULE ORAL ONCE
Status: COMPLETED | OUTPATIENT
Start: 2022-02-10 | End: 2022-02-10

## 2022-02-10 RX ORDER — DEXAMETHASONE SODIUM PHOSPHATE 4 MG/ML
INJECTION, SOLUTION INTRA-ARTICULAR; INTRALESIONAL; INTRAMUSCULAR; INTRAVENOUS; SOFT TISSUE PRN
Status: DISCONTINUED | OUTPATIENT
Start: 2022-02-10 | End: 2022-02-10 | Stop reason: SURG

## 2022-02-10 RX ORDER — LIDOCAINE HYDROCHLORIDE AND EPINEPHRINE 10; 10 MG/ML; UG/ML
INJECTION, SOLUTION INFILTRATION; PERINEURAL
Status: DISCONTINUED
Start: 2022-02-10 | End: 2022-02-10 | Stop reason: HOSPADM

## 2022-02-10 RX ORDER — HYDROMORPHONE HYDROCHLORIDE 1 MG/ML
0.1 INJECTION, SOLUTION INTRAMUSCULAR; INTRAVENOUS; SUBCUTANEOUS
Status: DISCONTINUED | OUTPATIENT
Start: 2022-02-10 | End: 2022-02-10 | Stop reason: HOSPADM

## 2022-02-10 RX ORDER — ACETAMINOPHEN 500 MG
1000 TABLET ORAL ONCE
Status: COMPLETED | OUTPATIENT
Start: 2022-02-10 | End: 2022-02-10

## 2022-02-10 RX ORDER — MIDAZOLAM HYDROCHLORIDE 1 MG/ML
1 INJECTION INTRAMUSCULAR; INTRAVENOUS
Status: DISCONTINUED | OUTPATIENT
Start: 2022-02-10 | End: 2022-02-10 | Stop reason: HOSPADM

## 2022-02-10 RX ORDER — EPINEPHRINE 1 MG/ML
INJECTION INTRAMUSCULAR; INTRAVENOUS; SUBCUTANEOUS
Status: DISCONTINUED
Start: 2022-02-10 | End: 2022-02-10 | Stop reason: HOSPADM

## 2022-02-10 RX ORDER — AMOXICILLIN AND CLAVULANATE POTASSIUM 875; 125 MG/1; MG/1
1 TABLET, FILM COATED ORAL 2 TIMES DAILY
Qty: 20 TABLET | Refills: 0 | Status: SHIPPED | OUTPATIENT
Start: 2022-02-10 | End: 2022-02-20

## 2022-02-10 RX ORDER — ESMOLOL HYDROCHLORIDE 10 MG/ML
INJECTION INTRAVENOUS PRN
Status: DISCONTINUED | OUTPATIENT
Start: 2022-02-10 | End: 2022-02-10 | Stop reason: SURG

## 2022-02-10 RX ORDER — LABETALOL HYDROCHLORIDE 5 MG/ML
5 INJECTION, SOLUTION INTRAVENOUS
Status: DISCONTINUED | OUTPATIENT
Start: 2022-02-10 | End: 2022-02-10 | Stop reason: HOSPADM

## 2022-02-10 RX ORDER — HYDRALAZINE HYDROCHLORIDE 20 MG/ML
5 INJECTION INTRAMUSCULAR; INTRAVENOUS
Status: DISCONTINUED | OUTPATIENT
Start: 2022-02-10 | End: 2022-02-10 | Stop reason: HOSPADM

## 2022-02-10 RX ORDER — ONDANSETRON 2 MG/ML
4 INJECTION INTRAMUSCULAR; INTRAVENOUS
Status: COMPLETED | OUTPATIENT
Start: 2022-02-10 | End: 2022-02-10

## 2022-02-10 RX ORDER — LIDOCAINE HYDROCHLORIDE 20 MG/ML
INJECTION, SOLUTION EPIDURAL; INFILTRATION; INTRACAUDAL; PERINEURAL PRN
Status: DISCONTINUED | OUTPATIENT
Start: 2022-02-10 | End: 2022-02-10 | Stop reason: SURG

## 2022-02-10 RX ORDER — BACITRACIN ZINC 500 [USP'U]/G
OINTMENT TOPICAL
Status: DISCONTINUED | OUTPATIENT
Start: 2022-02-10 | End: 2022-02-10 | Stop reason: HOSPADM

## 2022-02-10 RX ADMIN — HYDROMORPHONE HYDROCHLORIDE 1 MG: 2 INJECTION INTRAMUSCULAR; INTRAVENOUS; SUBCUTANEOUS at 07:44

## 2022-02-10 RX ADMIN — SODIUM CHLORIDE, POTASSIUM CHLORIDE, SODIUM LACTATE AND CALCIUM CHLORIDE: 600; 310; 30; 20 INJECTION, SOLUTION INTRAVENOUS at 07:19

## 2022-02-10 RX ADMIN — MIDAZOLAM HYDROCHLORIDE 2 MG: 1 INJECTION, SOLUTION INTRAMUSCULAR; INTRAVENOUS at 07:28

## 2022-02-10 RX ADMIN — DEXAMETHASONE SODIUM PHOSPHATE 8 MG: 4 INJECTION, SOLUTION INTRA-ARTICULAR; INTRALESIONAL; INTRAMUSCULAR; INTRAVENOUS; SOFT TISSUE at 07:42

## 2022-02-10 RX ADMIN — SODIUM CHLORIDE, POTASSIUM CHLORIDE, SODIUM LACTATE AND CALCIUM CHLORIDE: 600; 310; 30; 20 INJECTION, SOLUTION INTRAVENOUS at 07:28

## 2022-02-10 RX ADMIN — FENTANYL CITRATE 100 MCG: 50 INJECTION, SOLUTION INTRAMUSCULAR; INTRAVENOUS at 07:33

## 2022-02-10 RX ADMIN — ACETAMINOPHEN 1000 MG: 500 TABLET ORAL at 07:12

## 2022-02-10 RX ADMIN — CEFAZOLIN 3 G: 330 INJECTION, POWDER, FOR SOLUTION INTRAMUSCULAR; INTRAVENOUS at 07:28

## 2022-02-10 RX ADMIN — ONDANSETRON 4 MG: 2 INJECTION INTRAMUSCULAR; INTRAVENOUS at 08:14

## 2022-02-10 RX ADMIN — ROCURONIUM BROMIDE 100 MG: 10 INJECTION, SOLUTION INTRAVENOUS at 07:37

## 2022-02-10 RX ADMIN — CELECOXIB 200 MG: 200 CAPSULE ORAL at 07:12

## 2022-02-10 RX ADMIN — LIDOCAINE HYDROCHLORIDE 100 MG: 20 INJECTION, SOLUTION EPIDURAL; INFILTRATION; INTRACAUDAL at 07:28

## 2022-02-10 RX ADMIN — METOCLOPRAMIDE 10 MG: 5 INJECTION, SOLUTION INTRAMUSCULAR; INTRAVENOUS at 07:28

## 2022-02-10 RX ADMIN — PROPOFOL 200 MG: 10 INJECTION, EMULSION INTRAVENOUS at 07:37

## 2022-02-10 RX ADMIN — ESMOLOL HYDROCHLORIDE 20 MG: 100 INJECTION, SOLUTION INTRAVENOUS at 07:43

## 2022-02-10 RX ADMIN — ONDANSETRON 4 MG: 2 INJECTION INTRAMUSCULAR; INTRAVENOUS at 09:18

## 2022-02-10 ASSESSMENT — PAIN SCALES - GENERAL: PAIN_LEVEL: 0

## 2022-02-10 ASSESSMENT — PAIN DESCRIPTION - PAIN TYPE
TYPE: SURGICAL PAIN

## 2022-02-10 ASSESSMENT — FIBROSIS 4 INDEX: FIB4 SCORE: 0.43

## 2022-02-10 NOTE — OR NURSING
COVID-19 Pre-surgery screenin. Do you have an undiagnosed respiratory illness or symptoms such as coughing or sneezing? NO (Yes/No)      2. Do you have an unexplained fever greater than 100.4 degrees Fahrenheit or 38 degrees Celsius?     NO (Yes/No)    3. Have you had direct exposure to a patient who tested positive for Covid-19?    NO (Yes/No)    4. Have you had any loss of your sense of taste or smell? Have you had N/V or sore throat? NO    Patient has been informed of visitor policy and asked to wear a mask upon entering the hospital   YES (Yes/No)

## 2022-02-10 NOTE — ANESTHESIA TIME REPORT
Anesthesia Start and Stop Event Times     Date Time Event    2/10/2022 0727 Ready for Procedure     0728 Anesthesia Start     0841 Anesthesia Stop        Responsible Staff  02/10/22    Name Role Begin End    Domingo Brown M.D. Anesth 0728 0841        Preop Diagnosis (Free Text):  Pre-op Diagnosis     Chronic maxillary sinusitis        Preop Diagnosis (Codes):    Premium Reason  Non-Premium    Comments:

## 2022-02-10 NOTE — ANESTHESIA PREPROCEDURE EVALUATION
Case: [id number] Date/Time: 02/10/22 0715    Procedures:       STEREOTACTIC COMPUTER ASSISTED PROCEDURE CRANIAL,EXTRADURAL (N/A Head)      ENDOSCOPY, PARANASAL SINUS, WITH TOTAL ETHMOIDECTOMY, SPHENOIDOTOMY, MAXILLARY ANTROSTOMY, SPHEN+MAX SIN TISS REM, AND EXPLORATION FRONT SIN (Right Nose)      SEPTOPLASTY, NOSE (N/A Nose)    Anesthesia type: General    Pre-op diagnosis: Chronic maxillary sinusitis    Location: [facility] ROOM 22 / SURGERY SAME DAY [facility]    Surgeons: LUL Valdez H&P:  PAST MEDICAL HISTORY:   36 y.o. female who presents for Procedure(s) (LRB):  STEREOTACTIC COMPUTER ASSISTED PROCEDURE CRANIAL,EXTRADURAL (N/A)  ENDOSCOPY, PARANASAL SINUS, WITH TOTAL ETHMOIDECTOMY, SPHENOIDOTOMY, MAXILLARY ANTROSTOMY, SPHEN+MAX SIN TISS REM, AND EXPLORATION FRONT SIN (Right)  SEPTOPLASTY, NOSE (N/A).  She has current and past medical problems significant for:    Patient denies history of seizures or strokes  Patient denies history of diabetes or thyroid disease  Patient denies history of GERD or esophageal disease  Patient denies history of EMMANUEL  Patient denies history of previous MI, chest pain or shortness of breath  Patient denies history of renal failure  Patient denies history of upper or lower extremity motor/sensory deficits   Patient denies history of bleeding disorders  Patient denies history of complications with anesthesia    Able to climb 2 flights of stair without dyspnea or angina, > 4 METs     Past Medical History:   Diagnosis Date   • Abnormal Pap smear    • Anxiety    • Anxiety disorder 02/08/2022    medicated   • Dental disorder 02/08/2022    lower permanent retainer   • Depression 10/19/2009   • Hypertension     10 years ago but resolved   • Migraine    • Tension headache        SMOKING/ALCOHOL/RECREATIONAL DRUG USE:  Social History     Tobacco Use   • Smoking status: Never Smoker   • Smokeless tobacco: Never Used   Vaping Use   • Vaping Use: Never used   Substance Use Topics   •  Alcohol use: No     Alcohol/week: 0.0 oz   • Drug use: Yes     Types: Inhaled     Comment: marijuana for anxiety     Social History     Substance and Sexual Activity   Drug Use Yes   • Types: Inhaled    Comment: marijuana for anxiety       PAST SURGICAL HISTORY:  No past surgical history on file.    ALLERGIES:   Allergies   Allergen Reactions   • Nkda [No Known Drug Allergy]        MEDICATIONS:  No current facility-administered medications on file prior to encounter.     Current Outpatient Medications on File Prior to Encounter   Medication Sig Dispense Refill   • ergocalciferol (DRISDOL) 39240 UNIT capsule Take 1 Capsule by mouth every 7 days. 8 Capsule 0   • doxycycline (VIBRAMYCIN) 100 MG Tab Take 1 Tablet by mouth 2 times a day. (Patient not taking: Reported on 2/8/2022) 14 Tablet 0   • methylPREDNISolone (MEDROL DOSEPAK) 4 MG Tablet Therapy Pack As directed on the packaging label. (Patient not taking: Reported on 2/8/2022) 21 Tablet 0   • Galcanezumab-gnlm (EMGALITY) 120 MG/ML Solution Auto-injector Inject 120 mg under the skin every 30 days. 3 Each 0   • ketorolac (TORADOL) 10 MG Tab TAKE ONE TABLET BY MOUTH EVERY 6 HOURS AS NEEDED 3 Tab 2   • sumatriptan (IMITREX) 100 MG tablet Take 1/2-1 tablet po at onset of headache, may repeat dose in 2 hours if unrelieved.  Do not exceed more than 2 tablets in 24 hours. 9 Tab 5       LABS:  Lab Results   Component Value Date/Time    HEMOGLOBIN 14.2 12/06/2021 0630    HEMATOCRIT 43.1 12/06/2021 0630    WBC 7.8 12/06/2021 0630     Lab Results   Component Value Date/Time    SODIUM 140 12/13/2021 0607    POTASSIUM 4.1 12/13/2021 0607    CHLORIDE 105 12/13/2021 0607    CO2 21 12/13/2021 0607    GLUCOSE 91 12/13/2021 0607    BUN 17 12/13/2021 0607    CALCIUM 8.8 12/13/2021 0607       SARS-CoV2 result: Negative    Urine hcg negative    PREVIOUS ANESTHETICS: See EMR  __________________________________________    Relevant Problems   NEURO   (positive) Chronic migraine       CARDIAC   (positive) Chronic migraine       Physical Exam    Airway   Mallampati: II  TM distance: >3 FB  Neck ROM: full       Cardiovascular - normal exam  Rhythm: regular  Rate: normal  (-) murmur     Dental - normal exam           Pulmonary - normal exam  Breath sounds clear to auscultation     Abdominal   (+) obese     Neurological - normal exam                 Anesthesia Plan    ASA 3   ASA physical status 3 criteria: morbid obesity - BMI greater than or equal to 40    Plan - general       Airway plan will be ETT          Induction: intravenous    Postoperative Plan: Postoperative administration of opioids is intended.    Pertinent diagnostic labs and testing reviewed    Informed Consent:    Anesthetic plan and risks discussed with patient.    Use of blood products discussed with: patient whom consented to blood products.

## 2022-02-10 NOTE — DISCHARGE INSTRUCTIONS
ACTIVITY: Rest and take it easy for the first 24 hours.  A responsible adult is recommended to remain with you during that time.  It is normal to feel sleepy.  We encourage you to not do anything that requires balance, judgment or coordination.    MILD FLU-LIKE SYMPTOMS ARE NORMAL. YOU MAY EXPERIENCE GENERALIZED MUSCLE ACHES, THROAT IRRITATION, HEADACHE AND/OR SOME NAUSEA.    FOR 24 HOURS DO NOT:  Drive, operate machinery or run household appliances.  Drink beer or alcoholic beverages.   Make important decisions or sign legal documents.    SPECIAL INSTRUCTIONS: neilmed rinses 5x/day start tomorrow   Follow up 1 week    DIET: To avoid nausea, slowly advance diet as tolerated, avoiding spicy or greasy foods for the first day.  Add more substantial food to your diet according to your physician's instructions.  Babies can be fed formula or breast milk as soon as they are hungry.  INCREASE FLUIDS AND FIBER TO AVOID CONSTIPATION.    FOLLOW-UP APPOINTMENT:  A follow-up appointment should be arranged with your doctor in [phone]; call to schedule.    You should CALL YOUR PHYSICIAN if you develop:  Fever greater than 101 degrees F.  Pain not relieved by medication, or persistent nausea or vomiting.  Excessive bleeding (blood soaking through dressing) or unexpected drainage from the wound.  Extreme redness or swelling around the incision site, drainage of pus or foul smelling drainage.  Inability to urinate or empty your bladder within 8 hours.  Problems with breathing or chest pain.    You should call [phone] if you develop problems with breathing or chest pain.  If you are unable to contact your doctor or surgical center, you should go to the nearest emergency room or urgent care center.  Physician's telephone #: [phone]    If any questions arise, call your doctor.  If your doctor is not available, please feel free to call the Surgical Center at [phone].     A registered nurse may call you a few days after your  surgery to see how you are doing after your procedure.    MEDICATIONS: Resume taking daily medication.  Take prescribed pain medication with food.  If no medication is prescribed, you may take non-aspirin pain medication if needed.  PAIN MEDICATION CAN BE VERY CONSTIPATING.  Take a stool softener or laxative such as senokot, pericolace, or milk of magnesia if needed.     Prescription given for none     Last pain medication given at Tylenol and Celebrex at 7:00 am    If your physician has prescribed pain medication that includes Acetaminophen (Tylenol), do not take additional Acetaminophen (Tylenol) while taking the prescribed medication.    Depression / Suicide Risk    As you are discharged from this [facility] facility, it is important to learn how to keep safe from harming yourself.    Recognize the warning signs:  · Abrupt changes in personality, positive or negative- including increase in energy   · Giving away possessions  · Change in eating patterns- significant weight changes-  positive or negative  · Change in sleeping patterns- unable to sleep or sleeping all the time   · Unwillingness or inability to communicate  · Depression  · Unusual sadness, discouragement and loneliness  · Talk of wanting to die  · Neglect of personal appearance   · Rebelliousness- reckless behavior  · Withdrawal from people/activities they love  · Confusion- inability to concentrate     If you or a loved one observes any of these behaviors or has concerns about self-harm, here's what you can do:  · Talk about it- your feelings and reasons for harming yourself  · Remove any means that you might use to hurt yourself (examples: pills, rope, extension cords, firearm)  · Get professional help from the community (Mental Health, Substance Abuse, psychological counseling)  · Do not be alone:Call your Safe Contact- someone whom you trust who will be there for you.  · Call your local CRISIS HOTLINE 367-8039 or 308-428-8361  · Call your  local Children's Mobile Crisis Response Team [place] (263) 095-4710 or www.[employer]  · Call the toll free National Suicide Prevention Hotlines   · National Suicide Prevention Lifeline 534-995-STFL (3426)  · National Hope Line Network 800-SUICIDE (906-2843)

## 2022-02-10 NOTE — ANESTHESIA PROCEDURE NOTES
Airway    Date/Time: 2/10/2022 7:41 AM  Performed by: Domingo Brown M.D.  Authorized by: Domingo Brown M.D.     Location:  OR  Urgency:  Elective  Indications for Airway Management:  Anesthesia      Spontaneous Ventilation: absent    Sedation Level:  Deep  Preoxygenated: Yes    Patient Position:  Sniffing  Mask Difficulty Assessment:  2 - vent by mask + OA or adjuvant +/- NMBA  Final Airway Type:  Endotracheal airway  Final Endotracheal Airway:  ETT  Cuffed: Yes    Technique Used for Successful ETT Placement:  Video laryngoscopy    Insertion Site:  Oral  Blade Type:  Glide  Laryngoscope Blade/Videolaryngoscope Blade Size:  3  ETT Size (mm):  7.0  Measured from:  Lips  ETT to Lips (cm):  22  Placement Verified by: auscultation and capnometry    Cormack-Lehane Classification:  Grade I - full view of glottis  Number of Attempts at Approach:  1   atraumatic

## 2022-02-10 NOTE — OR NURSING
0831 Patient arrived from OR. ID verified. Attached to monitors. 6L 02 via mask. Patient arousable. Nasal packing in place. Vital signs stable.   0853 Called and updated  plan of care.   0903 Patient tolerating po inakeke   0926 nasal drip pad  In place no bleeding noted.   0931  Anesthesiologist at the bedside talking to patient.   1000 Criteria met to transition patient to stage 2 recovery.   1010 Discharge instructions given to patient and  both verbalize understanding. Copy of instructions given to .   1014 Patient escorted via w/c to responsible adult with all her personal belongings.

## 2022-02-10 NOTE — ANESTHESIA POSTPROCEDURE EVALUATION
Patient: Nataliia Merida    Procedure Summary     Date: 02/10/22 Room / Location: [facility] ROOM 22 / SURGERY SAME DAY [facility]    Anesthesia Start: 0728 Anesthesia Stop: 0841    Procedures:       STEREOTACTIC COMPUTER ASSISTED PROCEDURE CRANIAL,EXTRADURAL (N/A Head)      ENDOSCOPY, PARANASAL SINUS, WITH TOTAL ETHMOIDECTOMY, MAXILLARY ANTROSTOMY, EXPLORATION FRONTAL SINUS (Right Nose) Diagnosis: (Chronic maxillary sinusitis)    Surgeons: Art Smiley M.D. Responsible Provider: Domingo Brown M.D.    Anesthesia Type: general ASA Status: 3          Final Anesthesia Type: general  Last vitals  BP   Blood Pressure: 153/95    Temp   36.7 °C (98 °F)    Pulse   78   Resp   17    SpO2   96 %      Anesthesia Post Evaluation    Patient location during evaluation: PACU  Patient participation: complete - patient participated  Level of consciousness: awake and alert  Pain score: 0    Airway patency: patent  Anesthetic complications: no  Cardiovascular status: hemodynamically stable  Respiratory status: acceptable  Hydration status: euvolemic    PONV: none          No complications documented.     Nurse Pain Score: 0 (NPRS)

## 2022-02-10 NOTE — OR SURGEON
Immediate Post OP Note    PreOp Diagnosis: crs      PostOp Diagnosis: same       Procedure(s):  1. R max   2. R total ethmoid   3. With IGS   Surgeon(s):  Art Smiley M.D.    Anesthesiologist/Type of Anesthesia:  Anesthesiologist: Domingo Brown M.D./General    Surgical Staff:  Circulator: Dunia Pires R.N.  Scrub Person: Adrianna Avalos    Specimens removed if any:  ID Type Source Tests Collected by Time Destination   1 : RIGHT MAXILLARY Other Other AFB CULTURE, FUNGAL CULTURE, AEROBIC/ANAEROBIC CULTURE (SURGERY) Art Smiley M.D. 2/10/2022  8:16 AM    A : right sinus content Other Other PATHOLOGY SPECIMEN Art Smiley M.D. 2/10/2022  7:04 AM        Estimated Blood Loss: 25cc    Findings: thick white mucus R max     Complications: none        2/10/2022 8:20 AM Art Smiley M.D.

## 2022-02-11 LAB
FUNGUS SPEC FUNGUS STN: NORMAL
SIGNIFICANT IND 70042: NORMAL
SITE SITE: NORMAL
SOURCE SOURCE: NORMAL

## 2022-02-12 LAB
BACTERIA WND AEROBE CULT: ABNORMAL
BACTERIA WND AEROBE CULT: ABNORMAL
GRAM STN SPEC: ABNORMAL
SIGNIFICANT IND 70042: ABNORMAL
SITE SITE: ABNORMAL
SOURCE SOURCE: ABNORMAL

## 2022-02-13 LAB
BACTERIA SPEC ANAEROBE CULT: NORMAL
SIGNIFICANT IND 70042: NORMAL
SITE SITE: NORMAL
SOURCE SOURCE: NORMAL

## 2022-03-08 LAB
FUNGUS SPEC CULT: NORMAL
FUNGUS SPEC FUNGUS STN: NORMAL
SIGNIFICANT IND 70042: NORMAL
SITE SITE: NORMAL
SOURCE SOURCE: NORMAL

## 2022-05-25 ENCOUNTER — HOSPITAL ENCOUNTER (OUTPATIENT)
Dept: LAB | Facility: MEDICAL CENTER | Age: 37
End: 2022-05-25
Attending: PHYSICIAN ASSISTANT
Payer: COMMERCIAL

## 2022-05-25 ENCOUNTER — HOSPITAL ENCOUNTER (OUTPATIENT)
Facility: MEDICAL CENTER | Age: 37
End: 2022-05-25
Attending: PHYSICIAN ASSISTANT
Payer: COMMERCIAL

## 2022-05-25 LAB
BASOPHILS # BLD AUTO: 0.5 % (ref 0–1.8)
BASOPHILS # BLD: 0.05 K/UL (ref 0–0.12)
CYTOLOGY REG CYTOL: NORMAL
EOSINOPHIL # BLD AUTO: 0.09 K/UL (ref 0–0.51)
EOSINOPHIL NFR BLD: 0.8 % (ref 0–6.9)
ERYTHROCYTE [DISTWIDTH] IN BLOOD BY AUTOMATED COUNT: 42.2 FL (ref 35.9–50)
HCT VFR BLD AUTO: 44.3 % (ref 37–47)
HGB BLD-MCNC: 14.5 G/DL (ref 12–16)
IMM GRANULOCYTES # BLD AUTO: 0.05 K/UL (ref 0–0.11)
IMM GRANULOCYTES NFR BLD AUTO: 0.5 % (ref 0–0.9)
LYMPHOCYTES # BLD AUTO: 3.33 K/UL (ref 1–4.8)
LYMPHOCYTES NFR BLD: 30.8 % (ref 22–41)
MCH RBC QN AUTO: 29.6 PG (ref 27–33)
MCHC RBC AUTO-ENTMCNC: 32.7 G/DL (ref 33.6–35)
MCV RBC AUTO: 90.4 FL (ref 81.4–97.8)
MONOCYTES # BLD AUTO: 0.39 K/UL (ref 0–0.85)
MONOCYTES NFR BLD AUTO: 3.6 % (ref 0–13.4)
NEUTROPHILS # BLD AUTO: 6.9 K/UL (ref 2–7.15)
NEUTROPHILS NFR BLD: 63.8 % (ref 44–72)
NRBC # BLD AUTO: 0 K/UL
NRBC BLD-RTO: 0 /100 WBC
PLATELET # BLD AUTO: 352 K/UL (ref 164–446)
PMV BLD AUTO: 10.9 FL (ref 9–12.9)
RBC # BLD AUTO: 4.9 M/UL (ref 4.2–5.4)
WBC # BLD AUTO: 10.8 K/UL (ref 4.8–10.8)

## 2022-05-25 PROCEDURE — 84703 CHORIONIC GONADOTROPIN ASSAY: CPT

## 2022-05-25 PROCEDURE — 36415 COLL VENOUS BLD VENIPUNCTURE: CPT

## 2022-05-25 PROCEDURE — 85025 COMPLETE CBC W/AUTO DIFF WBC: CPT

## 2022-05-25 PROCEDURE — 84443 ASSAY THYROID STIM HORMONE: CPT

## 2022-05-26 LAB
HCG SERPL QL: NEGATIVE
TSH SERPL DL<=0.005 MIU/L-ACNC: 2.12 UIU/ML (ref 0.38–5.33)

## 2022-06-02 ENCOUNTER — HOSPITAL ENCOUNTER (OUTPATIENT)
Dept: RADIOLOGY | Facility: MEDICAL CENTER | Age: 37
End: 2022-06-02
Attending: PHYSICIAN ASSISTANT
Payer: COMMERCIAL

## 2022-06-02 DIAGNOSIS — N63.15 BREAST LUMP ON RIGHT SIDE AT 6 O'CLOCK POSITION: ICD-10-CM

## 2022-06-02 DIAGNOSIS — L98.8 VASCULAR DISORDER OF SKIN: ICD-10-CM

## 2022-06-02 PROCEDURE — 76642 ULTRASOUND BREAST LIMITED: CPT | Mod: RT

## 2022-06-02 PROCEDURE — G0279 TOMOSYNTHESIS, MAMMO: HCPCS

## 2022-06-08 ENCOUNTER — HOSPITAL ENCOUNTER (OUTPATIENT)
Facility: MEDICAL CENTER | Age: 37
End: 2022-06-08
Attending: PHYSICIAN ASSISTANT
Payer: COMMERCIAL

## 2022-06-08 LAB — PATHOLOGY CONSULT NOTE: NORMAL

## 2022-06-08 PROCEDURE — 88305 TISSUE EXAM BY PATHOLOGIST: CPT

## 2022-06-22 ENCOUNTER — HOSPITAL ENCOUNTER (OUTPATIENT)
Dept: LAB | Facility: MEDICAL CENTER | Age: 37
End: 2022-06-22
Attending: PHYSICIAN ASSISTANT
Payer: COMMERCIAL

## 2022-06-22 LAB — SPECIMEN SOURCE: NORMAL

## 2022-06-22 PROCEDURE — 87491 CHLMYD TRACH DNA AMP PROBE: CPT

## 2022-06-22 PROCEDURE — 87591 N.GONORRHOEAE DNA AMP PROB: CPT

## 2022-06-23 LAB
C TRACH DNA GENITAL QL NAA+PROBE: NEGATIVE
N GONORRHOEA DNA GENITAL QL NAA+PROBE: NEGATIVE
SPECIMEN SOURCE: NORMAL

## 2022-09-27 ENCOUNTER — OFFICE VISIT (OUTPATIENT)
Dept: MEDICAL GROUP | Facility: PHYSICIAN GROUP | Age: 37
End: 2022-09-27
Payer: COMMERCIAL

## 2022-09-27 VITALS
SYSTOLIC BLOOD PRESSURE: 142 MMHG | WEIGHT: 275.8 LBS | RESPIRATION RATE: 16 BRPM | BODY MASS INDEX: 40.85 KG/M2 | HEIGHT: 69 IN | DIASTOLIC BLOOD PRESSURE: 98 MMHG | HEART RATE: 85 BPM | OXYGEN SATURATION: 97 % | TEMPERATURE: 98 F

## 2022-09-27 DIAGNOSIS — F41.9 ANXIETY: Primary | ICD-10-CM

## 2022-09-27 PROCEDURE — 99213 OFFICE O/P EST LOW 20 MIN: CPT | Performed by: NURSE PRACTITIONER

## 2022-09-27 RX ORDER — HYDROXYZINE HYDROCHLORIDE 10 MG/1
10 TABLET, FILM COATED ORAL 3 TIMES DAILY PRN
Qty: 60 TABLET | Refills: 0 | Status: SHIPPED | OUTPATIENT
Start: 2022-09-27 | End: 2022-10-27

## 2022-09-27 ASSESSMENT — PATIENT HEALTH QUESTIONNAIRE - PHQ9
CLINICAL INTERPRETATION OF PHQ2 SCORE: 3
5. POOR APPETITE OR OVEREATING: 2 - MORE THAN HALF THE DAYS
SUM OF ALL RESPONSES TO PHQ QUESTIONS 1-9: 10

## 2022-09-27 ASSESSMENT — FIBROSIS 4 INDEX: FIB4 SCORE: 0.34

## 2022-09-27 NOTE — PROGRESS NOTES
Subjective:     CC: The primary encounter diagnosis was Anxiety. A diagnosis of Need for vaccination was also pertinent to this visit.      HPI:   Nataliia is an established patient of mine here for follow-up.  We discussed the following problems:    1. Anxiety  Chronic condition, stable. Patient is here for evaluation today.       Past Medical History:   Diagnosis Date    Abnormal Pap smear     Anxiety     Anxiety disorder 02/08/2022    medicated    Dental disorder 02/08/2022    lower permanent retainer    Depression 10/19/2009    Hypertension     10 years ago but resolved    Migraine     Tension headache        Social History     Tobacco Use    Smoking status: Never    Smokeless tobacco: Never   Vaping Use    Vaping Use: Never used   Substance Use Topics    Alcohol use: No     Alcohol/week: 0.0 oz    Drug use: Yes     Types: Inhaled     Comment: marijuana for anxiety       Current Outpatient Medications Ordered in Epic   Medication Sig Dispense Refill    hydrOXYzine HCl (ATARAX) 10 MG Tab Take 1 Tablet by mouth 3 times a day as needed for Itching for up to 30 days. 60 Tablet 0    HYDROcodone-acetaminophen (NORCO) 5-325 MG Tab per tablet Take 1 Tablet by mouth every four hours as needed.      fluoxetine (PROZAC) 40 MG capsule TAKE ONE CAPSULE BY MOUTH DAILY 90 Capsule 3    sumatriptan (IMITREX) 100 MG tablet Take 1/2-1 tablet po at onset of headache, may repeat dose in 2 hours if unrelieved.  Do not exceed more than 2 tablets in 24 hours. 9 Tab 5     No current Epic-ordered facility-administered medications on file.       Allergies:  Nkda [no known drug allergy]    Health Maintenance: Completed    ROS:  Gen: no fevers/chills, no changes in weight  Eyes: no changes in vision  ENT: no sore throat, no hearing loss, no bloody nose  Pulm: no sob, no cough  CV: no chest pain, no palpitations  GI: no nausea/vomiting, no diarrhea  : no dysuria  MSk: no myalgias  Skin: no rash  Neuro: no headaches, no  "numbness/tingling  Heme/Lymph: no easy bruising      Objective:       Exam:  BP (!) 142/98 (BP Location: Left arm, Patient Position: Sitting, BP Cuff Size: Adult long)   Pulse 85   Temp 36.7 °C (98 °F) (Temporal)   Resp 16   Ht 1.753 m (5' 9\")   Wt (!) 125 kg (275 lb 12.8 oz)   SpO2 97%   BMI 40.73 kg/m²  Body mass index is 40.73 kg/m².    Gen: Alert and oriented, No apparent distress. Mood and affect appropriate. Thoughts linear and logical.   Neck: Neck is supple without lymphadenopathy.  Ext: No clubbing, cyanosis, edema.    Labs: Dated: None    Assessment & Plan:     37 y.o. female with the following -     Anxiety  Chronic condition, worsening.  Patient states that she has been having worsening anxiety lately.  Last year she lost her brother to [place] and this past August she lost her grandmother. She would like referral to mental health, which was placed today.  Additionally, patient states that she does have alprazolam at home for more significant episodes of anxiety but she would really like to try something else as she fears dependence.  Patient will try hydroxyzine 10 mg up to TID today.  She will let me know in the next couple of weeks if this is helpful to her.  Could consider buspirone or propranolol in the future, but she does not describe panic attacks specifically just heightened anxiety.       Orders Placed This Encounter    INFLUENZA VACCINE QUAD INJ (PF)    Referral to Behavioral Health    hydrOXYzine HCl (ATARAX) 10 MG Tab          Return in about 4 weeks (around 10/25/2022), or if symptoms worsen or fail to improve.    I have placed the below orders and discussed them with an approved delegating provider.  The MA is performing the below orders under the direction of Angelina Reyes MD.    Please note that this dictation was created using voice recognition software. I have made every reasonable attempt to correct obvious errors, but I expect that there are errors of grammar and " possibly content that I did not discover before finalizing the note.

## 2022-09-27 NOTE — ASSESSMENT & PLAN NOTE
Chronic condition, worsening.  Patient states that she has been having worsening anxiety lately.  Last year she lost her brother to [place]'s and this past August she lost her grandmother. She would like referral to mental health, which was placed today.  Additionally, patient states that she does have alprazolam at home for more significant episodes of anxiety but she would really like to try something else as she fears dependence.  Patient will try hydroxyzine 10 mg up to TID today.  She will let me know in the next couple of weeks if this is helpful to her.  Could consider buspirone or propranolol in the future, but she does not describe panic attacks specifically just heightened anxiety.

## 2022-10-05 ENCOUNTER — OFFICE VISIT (OUTPATIENT)
Dept: URGENT CARE | Facility: PHYSICIAN GROUP | Age: 37
End: 2022-10-05
Payer: COMMERCIAL

## 2022-10-05 VITALS
HEIGHT: 69 IN | TEMPERATURE: 97.2 F | RESPIRATION RATE: 16 BRPM | WEIGHT: 275 LBS | BODY MASS INDEX: 40.73 KG/M2 | DIASTOLIC BLOOD PRESSURE: 80 MMHG | SYSTOLIC BLOOD PRESSURE: 120 MMHG | HEART RATE: 82 BPM | OXYGEN SATURATION: 98 %

## 2022-10-05 DIAGNOSIS — R30.0 DYSURIA: ICD-10-CM

## 2022-10-05 LAB
APPEARANCE UR: NORMAL
BILIRUB UR STRIP-MCNC: NEGATIVE MG/DL
COLOR UR AUTO: YELLOW
GLUCOSE UR STRIP.AUTO-MCNC: NEGATIVE MG/DL
INT CON NEG: NEGATIVE
INT CON POS: POSITIVE
KETONES UR STRIP.AUTO-MCNC: NEGATIVE MG/DL
LEUKOCYTE ESTERASE UR QL STRIP.AUTO: NORMAL
NITRITE UR QL STRIP.AUTO: NORMAL
PH UR STRIP.AUTO: 5.5 [PH] (ref 5–8)
POC URINE PREGNANCY TEST: NEGATIVE
PROT UR QL STRIP: 30 MG/DL
RBC UR QL AUTO: NORMAL
SP GR UR STRIP.AUTO: 1.02
UROBILINOGEN UR STRIP-MCNC: 0.2 MG/DL

## 2022-10-05 PROCEDURE — 81002 URINALYSIS NONAUTO W/O SCOPE: CPT | Performed by: PHYSICIAN ASSISTANT

## 2022-10-05 PROCEDURE — 81025 URINE PREGNANCY TEST: CPT | Performed by: PHYSICIAN ASSISTANT

## 2022-10-05 PROCEDURE — 99213 OFFICE O/P EST LOW 20 MIN: CPT | Performed by: PHYSICIAN ASSISTANT

## 2022-10-05 RX ORDER — NITROFURANTOIN 25; 75 MG/1; MG/1
100 CAPSULE ORAL EVERY 12 HOURS
Qty: 10 CAPSULE | Refills: 0 | Status: SHIPPED | OUTPATIENT
Start: 2022-10-05 | End: 2022-10-10

## 2022-10-05 ASSESSMENT — ENCOUNTER SYMPTOMS
WEAKNESS: 0
NAUSEA: 0
ABDOMINAL PAIN: 0
VOMITING: 0
DIZZINESS: 0
FLANK PAIN: 0
MYALGIAS: 0
HEADACHES: 0
FEVER: 0
CHILLS: 0
DIAPHORESIS: 0

## 2022-10-05 ASSESSMENT — FIBROSIS 4 INDEX: FIB4 SCORE: 0.34

## 2022-10-05 NOTE — PATIENT INSTRUCTIONS
Urinary Tract Infection, Adult  A urinary tract infection (UTI) is an infection of any part of the urinary tract. The urinary tract includes:  The kidneys.  The ureters.  The bladder.  The urethra.  These organs make, store, and get rid of pee (urine) in the body.  What are the causes?  This is caused by germs (bacteria) in your genital area. These germs grow and cause swelling (inflammation) of your urinary tract.  What increases the risk?  You are more likely to develop this condition if:  You have a small, thin tube (catheter) to drain pee.  You cannot control when you pee or poop (incontinence).  You are female, and:  You use these methods to prevent pregnancy:  A medicine that kills sperm (spermicide).  A device that blocks sperm (diaphragm).  You have low levels of a female hormone (estrogen).  You are pregnant.  You have genes that add to your risk.  You are sexually active.  You take antibiotic medicines.  You have trouble peeing because of:  A prostate that is bigger than normal, if you are male.  A blockage in the part of your body that drains pee from the bladder (urethra).  A kidney stone.  A nerve condition that affects your bladder (neurogenic bladder).  Not getting enough to drink.  Not peeing often enough.  You have other conditions, such as:  Diabetes.  A weak disease-fighting system (immune system).  Sickle cell disease.  Gout.  Injury of the spine.  What are the signs or symptoms?  Symptoms of this condition include:  Needing to pee right away (urgently).  Peeing often.  Peeing small amounts often.  Pain or burning when peeing.  Blood in the pee.  Pee that smells bad or not like normal.  Trouble peeing.  Pee that is cloudy.  Fluid coming from the vagina, if you are female.  Pain in the belly or lower back.  Other symptoms include:  Throwing up (vomiting).  No urge to eat.  Feeling mixed up (confused).  Being tired and grouchy (irritable).  A fever.  Watery poop (diarrhea).  How is this  treated?  This condition may be treated with:  Antibiotic medicine.  Other medicines.  Drinking enough water.  Follow these instructions at home:    Medicines  Take over-the-counter and prescription medicines only as told by your doctor.  If you were prescribed an antibiotic medicine, take it as told by your doctor. Do not stop taking it even if you start to feel better.  General instructions  Make sure you:  Pee until your bladder is empty.  Do not hold pee for a long time.  Empty your bladder after sex.  Wipe from front to back after pooping if you are a female. Use each tissue one time when you wipe.  Drink enough fluid to keep your pee pale yellow.  Keep all follow-up visits as told by your doctor. This is important.  Contact a doctor if:  You do not get better after 1-2 days.  Your symptoms go away and then come back.  Get help right away if:  You have very bad back pain.  You have very bad pain in your lower belly.  You have a fever.  You are sick to your stomach (nauseous).  You are throwing up.  Summary  A urinary tract infection (UTI) is an infection of any part of the urinary tract.  This condition is caused by germs in your genital area.  There are many risk factors for a UTI. These include having a small, thin tube to drain pee and not being able to control when you pee or poop.  Treatment includes antibiotic medicines for germs.  Drink enough fluid to keep your pee pale yellow.  This information is not intended to replace advice given to you by your health care provider. Make sure you discuss any questions you have with your health care provider.  Document Released: 06/05/2009 Document Revised: 12/05/2019 Document Reviewed: 06/27/2019  [employer] Patient Education © 2020 [employer]

## 2022-10-05 NOTE — PROGRESS NOTES
Subjective:     CHIEF COMPLAINT  Chief Complaint   Patient presents with    UTI     X 3 days with frequent need of urination, bladder pain       HPI  Nataliia Merida is a very pleasant 37 y.o. female who presents to the clinic with UTI-like symptoms x3 days.  Patient has been experiencing urinary frequency, urgency and dysuria.  Describes mild pressure to the suprapubic region.  Denies any flank pain, fevers, chills, nausea or vomiting.  She has been increasing her fluid intake with no improvement.  No history of recurrent UTIs.  Having normal menstrual cycles.  No vaginal discharge, rash or lesion.    REVIEW OF SYSTEMS  Review of Systems   Constitutional:  Negative for chills, diaphoresis, fever and malaise/fatigue.   Gastrointestinal:  Negative for abdominal pain, nausea and vomiting.   Genitourinary:  Positive for dysuria, frequency and urgency. Negative for flank pain and hematuria.   Musculoskeletal:  Negative for myalgias.   Skin:  Negative for itching and rash.   Neurological:  Negative for dizziness, weakness and headaches.     PAST MEDICAL HISTORY  Patient Active Problem List    Diagnosis Date Noted    Abnormal vaginal bleeding 11/17/2021    Acute maxillary sinusitis 11/17/2021    Fatigue 11/17/2021    Depression 07/02/2018    BMI 40.0-44.9, adult (HCC) 03/10/2017    Chronic migraine 11/03/2015    Anxiety 01/23/2015       SURGICAL HISTORY   has a past surgical history that includes stereotactic computer assisted procedure cranial, extradural (N/A, 2/10/2022) and endoscopy, paranasal sinus, with total ethmoidectomy, sn (Right, 2/10/2022).    ALLERGIES  Allergies   Allergen Reactions    Nkda [No Known Drug Allergy]        CURRENT MEDICATIONS  Home Medications       Reviewed by Marc Harding P.A.-C. (Physician Assistant) on 10/05/22 at 0823  Med List Status: <None>     Medication Last Dose Status   fluoxetine (PROZAC) 40 MG capsule Taking Active   HYDROcodone-acetaminophen (NORCO) 5-325 MG Tab per tablet  "Not Taking Flagged for Removal   hydrOXYzine HCl (ATARAX) 10 MG Tab Not Taking Flagged for Removal   sumatriptan (IMITREX) 100 MG tablet Not Taking Flagged for Removal                    SOCIAL HISTORY  Social History     Tobacco Use    Smoking status: Never    Smokeless tobacco: Never   Vaping Use    Vaping Use: Never used   Substance and Sexual Activity    Alcohol use: No     Alcohol/week: 0.0 oz    Drug use: Yes     Types: Inhaled     Comment: marijuana for anxiety    Sexual activity: Yes     Partners: Male     Birth control/protection: Male Sterilization       FAMILY HISTORY  Family History   Problem Relation Age of Onset    No Known Problems Mother     No Known Problems Father     No Known Problems Sister     Other Brother         huntings disease    Canyon's disease Brother     No Known Problems Maternal Grandmother     No Known Problems Maternal Grandfather     No Known Problems Paternal Grandmother     No Known Problems Paternal Grandfather     No Known Problems Brother     No Known Problems Sister           Objective:     VITAL SIGNS: /80 (BP Location: Left arm, Patient Position: Sitting, BP Cuff Size: Large adult)   Pulse 82   Temp 36.2 °C (97.2 °F) (Temporal)   Resp 16   Ht 1.753 m (5' 9\")   Wt 125 kg (275 lb)   SpO2 98%   BMI 40.61 kg/m²     PHYSICAL EXAM  Physical Exam  Constitutional:       General: She is not in acute distress.     Appearance: Normal appearance. She is not ill-appearing, toxic-appearing or diaphoretic.   HENT:      Head: Normocephalic and atraumatic.   Eyes:      Conjunctiva/sclera: Conjunctivae normal.   Pulmonary:      Effort: Pulmonary effort is normal.   Abdominal:      General: Bowel sounds are normal. There is no distension.      Palpations: Abdomen is soft. There is no mass.      Tenderness: There is no abdominal tenderness. There is no right CVA tenderness, left CVA tenderness, guarding or rebound.      Hernia: No hernia is present.   Musculoskeletal:         " General: Normal range of motion.      Cervical back: Normal range of motion. No muscular tenderness.   Lymphadenopathy:      Cervical: No cervical adenopathy.   Skin:     General: Skin is warm and dry.      Capillary Refill: Capillary refill takes less than 2 seconds.   Neurological:      General: No focal deficit present.      Mental Status: She is alert and oriented to person, place, and time. Mental status is at baseline.   Psychiatric:         Mood and Affect: Mood normal.         Thought Content: Thought content normal.       Assessment/Plan:     1. Dysuria  - POCT Urinalysis  - POCT PREGNANCY  - nitrofurantoin (MACROBID) 100 MG Cap; Take 1 Capsule by mouth every 12 hours for 5 days.  Dispense: 10 Capsule; Refill: 0      MDM/Comments:    - Pt educated on preventative measures for avoiding future UTIs  - Advised to increase fluid intake  - OTC Pyridium (Azo) for symptomatic relief, advised that it will turn urine orange in color  - ER precautions given regarding pyelonephritis including fevers greater than 101 and, vomiting and dehydration, increased back pain.    Differential diagnosis, natural history, supportive care, and indications for immediate follow-up discussed. All questions answered. Patient agrees with the plan of care.    Follow-up as needed if symptoms worsen or fail to improve to PCP, Urgent care or Emergency Room.    I have personally reviewed prior external notes and test results pertinent to today's visit.  I have independently reviewed and interpreted all diagnostics ordered during this urgent care acute visit.   Discussed management options (risks,benefits, and alternatives to treatment). Pt expresses understanding and the treatment plan was agreed upon. Questions were encouraged and answered to pt's satisfaction.    Please note that this dictation was created using voice recognition software. I have made a reasonable attempt to correct obvious errors, but I expect that there are errors of  grammar and possibly content that I did not discover before finalizing the note.

## 2022-10-17 ENCOUNTER — OFFICE VISIT (OUTPATIENT)
Dept: URGENT CARE | Facility: PHYSICIAN GROUP | Age: 37
End: 2022-10-17
Payer: COMMERCIAL

## 2022-10-17 ENCOUNTER — HOSPITAL ENCOUNTER (OUTPATIENT)
Facility: MEDICAL CENTER | Age: 37
End: 2022-10-17
Attending: FAMILY MEDICINE
Payer: COMMERCIAL

## 2022-10-17 VITALS
SYSTOLIC BLOOD PRESSURE: 122 MMHG | RESPIRATION RATE: 16 BRPM | WEIGHT: 270 LBS | TEMPERATURE: 97.2 F | HEIGHT: 69 IN | BODY MASS INDEX: 39.99 KG/M2 | HEART RATE: 84 BPM | OXYGEN SATURATION: 98 % | DIASTOLIC BLOOD PRESSURE: 74 MMHG

## 2022-10-17 DIAGNOSIS — B37.31 VAGINAL CANDIDIASIS: ICD-10-CM

## 2022-10-17 DIAGNOSIS — N76.0 ACUTE VAGINITIS: ICD-10-CM

## 2022-10-17 DIAGNOSIS — R30.0 DYSURIA: ICD-10-CM

## 2022-10-17 LAB
APPEARANCE UR: NORMAL
BILIRUB UR STRIP-MCNC: NORMAL MG/DL
C TRACH DNA GENITAL QL NAA+PROBE: NEGATIVE
CANDIDA DNA VAG QL PROBE+SIG AMP: POSITIVE
COLOR UR AUTO: NORMAL
G VAGINALIS DNA VAG QL PROBE+SIG AMP: NEGATIVE
GLUCOSE UR STRIP.AUTO-MCNC: NEGATIVE MG/DL
INT CON NEG: NEGATIVE
INT CON POS: POSITIVE
KETONES UR STRIP.AUTO-MCNC: NORMAL MG/DL
LEUKOCYTE ESTERASE UR QL STRIP.AUTO: NEGATIVE
N GONORRHOEA DNA GENITAL QL NAA+PROBE: NEGATIVE
NITRITE UR QL STRIP.AUTO: NEGATIVE
PH UR STRIP.AUTO: 5.5 [PH] (ref 5–8)
POC URINE PREGNANCY TEST: NEGATIVE
PROT UR QL STRIP: NORMAL MG/DL
RBC UR QL AUTO: NORMAL
SP GR UR STRIP.AUTO: >=1.03
SPECIMEN SOURCE: NORMAL
T VAGINALIS DNA VAG QL PROBE+SIG AMP: NEGATIVE
UROBILINOGEN UR STRIP-MCNC: 0.2 MG/DL

## 2022-10-17 PROCEDURE — 87480 CANDIDA DNA DIR PROBE: CPT

## 2022-10-17 PROCEDURE — 81002 URINALYSIS NONAUTO W/O SCOPE: CPT | Performed by: FAMILY MEDICINE

## 2022-10-17 PROCEDURE — 87660 TRICHOMONAS VAGIN DIR PROBE: CPT

## 2022-10-17 PROCEDURE — 87591 N.GONORRHOEAE DNA AMP PROB: CPT

## 2022-10-17 PROCEDURE — 99213 OFFICE O/P EST LOW 20 MIN: CPT | Performed by: FAMILY MEDICINE

## 2022-10-17 PROCEDURE — 87510 GARDNER VAG DNA DIR PROBE: CPT

## 2022-10-17 PROCEDURE — 87491 CHLMYD TRACH DNA AMP PROBE: CPT

## 2022-10-17 PROCEDURE — 81025 URINE PREGNANCY TEST: CPT | Performed by: FAMILY MEDICINE

## 2022-10-17 PROCEDURE — 87086 URINE CULTURE/COLONY COUNT: CPT

## 2022-10-17 RX ORDER — FLUCONAZOLE 150 MG/1
150 TABLET ORAL ONCE
Qty: 1 TABLET | Refills: 0 | Status: SHIPPED | OUTPATIENT
Start: 2022-10-17 | End: 2022-10-17

## 2022-10-17 ASSESSMENT — FIBROSIS 4 INDEX: FIB4 SCORE: 0.34

## 2022-10-17 ASSESSMENT — ENCOUNTER SYMPTOMS
COUGH: 0
FEVER: 0
SHORTNESS OF BREATH: 0
CHILLS: 0
FLANK PAIN: 1
NAUSEA: 0
DIZZINESS: 0
SORE THROAT: 0
MYALGIAS: 0
VOMITING: 0

## 2022-10-17 NOTE — PROGRESS NOTES
Subjective:   Nataliia Merida is a 37 y.o. female who presents for UTI (Was seen 2 weeks ago and antibiotics didn't work and now feeling worse)        UTI  This is a new (Reports dysuria over the past 2 weeks, intermittent vaginal discharge with blood clots after recent IUD placement June 2022) problem. Episode onset: 2 weeks. Pertinent negatives include no chills, coughing, fever, myalgias, nausea, rash, sore throat or vomiting. Associated symptoms comments: Reports intermittent back and flank pain. Treatments tried: Recent antibiotics prescribed urgent care visit 10/5/2022. The treatment provided no relief.   PMH:  has a past medical history of Abnormal Pap smear, Anxiety, Anxiety disorder (02/08/2022), Dental disorder (02/08/2022), Depression (10/19/2009), Hypertension, Migraine, and Tension headache.  MEDS:   Current Outpatient Medications:     fluconazole (DIFLUCAN) 150 MG tablet, Take 1 Tablet by mouth one time for 1 dose., Disp: 1 Tablet, Rfl: 0    fluoxetine (PROZAC) 40 MG capsule, TAKE ONE CAPSULE BY MOUTH DAILY, Disp: 90 Capsule, Rfl: 3    hydrOXYzine HCl (ATARAX) 10 MG Tab, Take 1 Tablet by mouth 3 times a day as needed for Itching for up to 30 days. (Patient not taking: Reported on 10/5/2022), Disp: 60 Tablet, Rfl: 0    HYDROcodone-acetaminophen (NORCO) 5-325 MG Tab per tablet, Take 1 Tablet by mouth every four hours as needed. (Patient not taking: Reported on 10/5/2022), Disp: , Rfl:     sumatriptan (IMITREX) 100 MG tablet, Take 1/2-1 tablet po at onset of headache, may repeat dose in 2 hours if unrelieved.  Do not exceed more than 2 tablets in 24 hours. (Patient not taking: Reported on 10/5/2022), Disp: 9 Tab, Rfl: 5  ALLERGIES:   Allergies   Allergen Reactions    Nkda [No Known Drug Allergy]      SURGHX:   Past Surgical History:   Procedure Laterality Date    STEREOTACTIC COMPUTER ASSISTED PROCEDURE CRANIAL, EXTRADURAL N/A 2/10/2022    Procedure: STEREOTACTIC COMPUTER ASSISTED PROCEDURE  "CRANIAL,EXTRADURAL;  Surgeon: Art Smiley M.D.;  Location: SURGERY SAME DAY [facility];  Service: Ent    ENDOSCOPY, PARANASAL SINUS, WITH TOTAL ETHMOIDECTOMY, SN Right 2/10/2022    Procedure: ENDOSCOPY, PARANASAL SINUS, WITH TOTAL ETHMOIDECTOMY, MAXILLARY ANTROSTOMY, EXPLORATION FRONTAL SINUS;  Surgeon: Art Smiley M.D.;  Location: SURGERY SAME DAY [facility];  Service: Ent     SOCHX:  reports that she has never smoked. She has never used smokeless tobacco. She reports current drug use. Drug: Inhaled. She reports that she does not drink alcohol.  FH:   Family History   Problem Relation Age of Onset    No Known Problems Mother     No Known Problems Father     No Known Problems Sister     Other Brother         huntings disease    Mcclusky's disease Brother     No Known Problems Maternal Grandmother     No Known Problems Maternal Grandfather     No Known Problems Paternal Grandmother     No Known Problems Paternal Grandfather     No Known Problems Brother     No Known Problems Sister      Review of Systems   Constitutional:  Negative for chills and fever.   HENT:  Negative for sore throat.    Respiratory:  Negative for cough and shortness of breath.    Gastrointestinal:  Negative for nausea and vomiting.   Genitourinary:  Positive for dysuria and flank pain.   Musculoskeletal:  Negative for myalgias.   Skin:  Negative for rash.   Neurological:  Negative for dizziness.      Objective:   /74 (BP Location: Left arm, Patient Position: Sitting, BP Cuff Size: Large adult)   Pulse 84   Temp 36.2 °C (97.2 °F) (Temporal)   Resp 16   Ht 1.753 m (5' 9\")   Wt 122 kg (270 lb)   SpO2 98%   BMI 39.87 kg/m²   Physical Exam  Vitals and nursing note reviewed.   Constitutional:       General: She is not in acute distress.     Appearance: She is well-developed.   HENT:      Head: Normocephalic and atraumatic.      Right Ear: External ear normal.      Left Ear: External ear normal.      Nose: Nose normal.      " Mouth/Throat:      Mouth: Mucous membranes are moist.   Eyes:      Conjunctiva/sclera: Conjunctivae normal.   Cardiovascular:      Rate and Rhythm: Normal rate.   Pulmonary:      Effort: Pulmonary effort is normal. No respiratory distress.      Breath sounds: Normal breath sounds.   Abdominal:      General: There is no distension.      Tenderness: There is no right CVA tenderness or left CVA tenderness.   Musculoskeletal:         General: Normal range of motion.   Skin:     General: Skin is warm and dry.   Neurological:      General: No focal deficit present.      Mental Status: She is alert and oriented to person, place, and time. Mental status is at baseline.      Gait: Gait (gait at baseline) normal.   Psychiatric:         Judgment: Judgment normal.      Latest Reference Range & Units 10/17/22 09:31   Candida species DNA Probe Negative  POSITIVE !   Gardnerella vaginalis DNA Probe Negative  Negative   !: Data is abnormal            Assessment/Plan:   1. Dysuria  - URINE CULTURE(NEW); Future  - POCT Urinalysis  - POCT Pregnancy    2. Acute vaginitis  - VAGINAL PATHOGENS DNA PANEL; Future  - Chlamydia/GC, PCR (Genital/Anal swab); Future    3. Vaginal candidiasis  - fluconazole (DIFLUCAN) 150 MG tablet; Take 1 Tablet by mouth one time for 1 dose.  Dispense: 1 Tablet; Refill: 0        Medical Decision Making/Course:  In the course of preparing for this visit with review of the pertinent past medical history, recent and past clinic visits, current medications, and performing chart, immunization, medical history and medication reconciliation, and in the further course of obtaining the current history pertinent to the clinic visit today, performing an exam and evaluation, ordering and independently evaluating labs, tests including point-of-care urinalysis, point-of-care urine pregnancy which was negative, vaginal pathogens panel, GC chlamydia PCR, and/or procedures, prescribing any recommended new medications as noted  above, providing any pertinent counseling and education and recommending further coordination of care including recommendation for symptomatic and supportive measures pending cultures and results as noted above, at least  27 minutes of total time were spent during this encounter.      Discussed close monitoring, return precautions, and supportive measures of maintaining adequate fluid hydration and caloric intake, relative rest and symptom management as needed for pain and/or fever.    Differential diagnosis, natural history, supportive care, and indications for immediate follow-up discussed.     Advised the patient to follow-up with the primary care physician for recheck, reevaluation, and consideration of further management.    Please note that this dictation was created using voice recognition software. I have worked with consultants from the vendor as well as technical experts from [email or web address] [facility] [employer] to optimize the interface. I have made every reasonable attempt to correct obvious errors, but I expect that there are errors of grammar and possibly content that I did not discover before finalizing the note.

## 2022-10-18 DIAGNOSIS — R30.0 DYSURIA: ICD-10-CM

## 2022-10-18 NOTE — RESULT ENCOUNTER NOTE
Please call patient and let them know the tests for Gonorrhea and Chlamydia were both negative.  Thank you.

## 2022-10-20 LAB
BACTERIA UR CULT: NORMAL
SIGNIFICANT IND 70042: NORMAL
SITE SITE: NORMAL
SOURCE SOURCE: NORMAL

## 2022-12-20 ENCOUNTER — OFFICE VISIT (OUTPATIENT)
Dept: BEHAVIORAL HEALTH | Facility: CLINIC | Age: 37
End: 2022-12-20
Payer: COMMERCIAL

## 2022-12-20 DIAGNOSIS — F41.9 ANXIETY: ICD-10-CM

## 2022-12-20 DIAGNOSIS — F33.1 MODERATE EPISODE OF RECURRENT MAJOR DEPRESSIVE DISORDER (HCC): ICD-10-CM

## 2022-12-20 PROCEDURE — 99999 PR NO CHARGE: CPT | Performed by: PSYCHIATRY & NEUROLOGY

## 2022-12-20 RX ORDER — BUSPIRONE HYDROCHLORIDE 15 MG/1
TABLET ORAL
Qty: 126 TABLET | Refills: 0 | Status: SHIPPED | OUTPATIENT
Start: 2022-12-20 | End: 2023-01-24 | Stop reason: SDUPTHER

## 2022-12-20 NOTE — PROGRESS NOTES
"  INITIAL PSYCHIATRIC EVALUATION      This provider informed the patient their medical records are totally confidential except for the use by other providers involved in their care, or if the patient signs a release, or to report instances of child or elder abuse, or if it is determined they are an immediate risk to harm themselves or others.      CHIEF COMPLAINT  \"Anxiety and depression my whole life\"      HISTORY OF PRESENT ILLNESS  Nataliia Merida is a 37 y.o. old female comes in today to establish care and for evaluation of mood and anxiety.  Patient has not previously seen a psychiatrist, and referred from her primary care provider to behavioral health.    Patient reports that she has had significant anxiety and depression most of her life, has been treated for it through primary care provider and psychiatric medications in the past.  Reports that in August 2022 her stepmom passed away and this brought her anxiety to a new level and her depression got worse.  Over the past several months she has had intense anxiety and fear that her cat is going to die.  This has impacted her life negatively.  She has had such intense fear that her cat is going to die that she has spent most of the days just watching her cat making sure that she is doing okay, she has slept with her cat at night to ensure she would be able to address any needs there May have.  This is different than how she behaved before.  Gives an example of a time where she did not follow through with an nail manicure appointment because she was too anxious and fearful to leave her cat for the brief amount of time.  This is difficult for her,, because she knows that there is nothing wrong with her cat.  She has taken her cat to the vet several times, her cat is perfectly healthy, has no issues.  The anxiety, fear, and stress surrounding her cat is applicable to other aspects of her life, but this has become significantly worse since the passing of her " mayte.  It has not been an easy couple years for her since the pandemic started.  Pandemic was stressful, and a year ago patient placed their  daughter up for adoption, and her stepmom passed away this year.  All these things have contributed to her feeling more anxious and stressed.  She has muscle tension and tightness, notes increased irritability due to this constant stress and worry, also reports associated fatigue.  She has had thoughts at times that it would be easier if she was not alive so that she would not have to constantly worry like this.    Patient reports depressed mood in the context of her increased and heightened anxiety.  She has an increased need for sleep, constantly feeling fatigued and tired.  Reports that she could sleep most of the day.  She has had some decrease in appetite, has had an unplanned loss of 10 pounds, although reports that she could stand to lose some weight.  She does feel guilty and hopeless and helpless at times.  No current suicidal ideation, last had suicidal ideation 1 month ago.      Patient has not engaged in psychotherapy previously.  Recently started talk therapy on line.  Has gone 6 visits.  Patient has a poor diet, typically eats lots of fast food, and take out.  She does not exercise.  Her hobbies are playing video games, reading, and being active in her [employer] community.    PSYCHIATRIC REVIEW OF SYSTEMS: denies manic symptoms, denies psychotic symptoms including AH / VH, denies OCD symptoms, denies restrictive eating or purging, and denies trauma related symptoms      MEDICAL REVIEW OF SYSTEMS:   Constitutional Fatigue   Eyes negative   Ears/Nose/Mouth/Throat negative   Cardiovascular negative   Respiratory negative   Gastrointestinal negative   Genitourinary negative   Muscular negative   Integumentary negative   Neurological negative   Endocrine negative   Hematologic/Lymphatic negative     CURRENT MEDICATIONS:  Current Outpatient Medications    Medication Sig Dispense Refill    busPIRone (BUSPAR) 15 MG tablet Take 0.5 Tablets by mouth 2 times a day for 7 days, THEN 1 Tablet 2 times a day for 7 days, THEN 1 Tablet 3 times a day for 35 days. Take with food. 126 Tablet 0    fluoxetine (PROZAC) 40 MG capsule Take 1 Capsule by mouth every day. 90 Capsule 3     No current facility-administered medications for this visit.       ALLERGIES:  Nkda [no known drug allergy]    PAST PSYCHIATRIC HISTORY  Prior psychiatric hospitalization: Hospitalized in  at [place], suicidal ideation with plan, did not attempt  Prior Self harm/suicide attempt: Cutting when she was 18 years old, does not self-harm anymore  Prior Diagnosis: Anxiety, depression  Has never been consistent with psychotherapy, frequently attends 1-2 sessions and stopped    PAST PSYCHIATRIC MEDICATIONS  Lexapro, to 20 mg  Sertraline, to 150 mg, had sexual side effects  Lithium, for 3 months after her hospitalization, stopped because she was never manic  Wellbutrin  Xanax, does not want to become dependent  Hydroxyzine, did not impact her anxiety  Doxepin, 6 mg  Trazodone    FAMILY HISTORY  Psychiatric diagnosis: Brother had [place]'s disease,  at age 29.  Grandma had depression.  Uncle had bipolar disorder  History of suicide attempts: Grandma suicide, uncle suicide  Substance abuse history: Denies    SUBSTANCE USE HISTORY:  ALCOHOL: Denies  TOBACCO: Denies  CANNABIS: Smokes nightly, 3 to 4 puffs of flower to help her relax before bed.  Has been doing this for 2 years  OPIOIDS: Denies  PRESCRIPTION MEDICATIONS: Denies  OTHERS: Denies  History of inpatient/outpatient rehab treatment: Denies      SOCIAL HISTORY  Childhood: born in [place] and describes childhood as good  Education: Graduated [employer]  in Special Education: no  Intellectual Disability: no  Employment: Works at [employer] and [employer]  Relationship:   Kids: Has had 2 children, both were placed for  adoption  Current living situation: Lives with   Current/past legal issues: no  History of emotional/physical/sexual abuse - no   History: no  Spiritual/Spiritism affiliation: Is very active in her [employer] organization    MEDICAL HISTORY  Past Medical History:   Diagnosis Date    Abnormal Pap smear     Anxiety     Anxiety disorder 02/08/2022    medicated    Dental disorder 02/08/2022    lower permanent retainer    Depression 10/19/2009    Hypertension     10 years ago but resolved    Migraine     Tension headache      Past Surgical History:   Procedure Laterality Date    STEREOTACTIC COMPUTER ASSISTED PROCEDURE CRANIAL, EXTRADURAL N/A 2/10/2022    Procedure: STEREOTACTIC COMPUTER ASSISTED PROCEDURE CRANIAL,EXTRADURAL;  Surgeon: Art Smiley M.D.;  Location: SURGERY SAME DAY [facility];  Service: Ent    ENDOSCOPY, PARANASAL SINUS, WITH TOTAL ETHMOIDECTOMY, SN Right 2/10/2022    Procedure: ENDOSCOPY, PARANASAL SINUS, WITH TOTAL ETHMOIDECTOMY, MAXILLARY ANTROSTOMY, EXPLORATION FRONTAL SINUS;  Surgeon: Art Smiley M.D.;  Location: SURGERY SAME DAY [facility];  Service: Ent         PHYSICAL EXAMINAION:  Vital signs: There were no vitals taken for this visit.  Musculoskeletal: Normal gait.   Abnormal movements: no    MENTAL STATUS EXAMINATION      General:   - Grooming and hygiene: Good and Casual,   - Apparent distress: no,   - Behavior: Tense  - Eye Contact:  Good,   - no psychomotor agitation or retardation    - Participation: Active verbal participation, Attentive, and Engaged  Orientation: Alert and Fully Oriented to person, place and time  Mood: Anxious  Affect: Flexible and Congruent with content,  Thought Process: Logical and Goal-directed  Thought Content: Denies suicidal or homicidal ideations, intent or plan Within normal limits  Perception: Denies auditory or visual hallucinations. No delusions noted Within normal limits  Attention span and concentration: Intact   Speech:Rate within normal  limits and Volume within normal limits  Language: Appropriate   Insight: Good  Judgment: Good  Recent and remote memory: No gross evidence of memory deficits      SCREENINGS:      6/3/2019    11:06 AM 1/7/2021     9:57 AM 9/27/2022     1:20 PM   Depression Screen (PHQ-2/PHQ-9)   PHQ-2 Total Score 0 0    PHQ-2 Total Score   3   PHQ-9 Total Score 9 4    PHQ-9 Total Score   10       Interpretation of PHQ-9 Total Score   Score Severity   1-4 No Depression   5-9 Mild Depression   10-14 Moderate Depression   15-19 Moderately Severe Depression   20-27 Severe Depression         View : No data to display.                      SAFETY ASSESSMENT - SELF:    Does patient acknowledge current or past symptoms of dangerousness to self? yes  History of suicide by family member: yes  History of suicide by friend/significant other: no  Recent change in amount/specificity/intensity of suicidal thoughts or self-harm behavior? no  Current access to firearms, medications, or other identified means of suicide/self-harm? no  If yes, willing to restrict access to means of suicide/self-harm? yes  Protective factors present: yes       SAFETY ASSESSMENT - OTHERS:    Does patient acknowledge current or past symptoms of aggressive behavior or risk to others? no  Recent change in amount/specificity/intensity of thoughts or threats to harm others? no  Current access to firearms/other identified means of harm? no  If yes, willing to restrict access to weapons/means of harm? yes       CURRENT RISK:       Suicidal: Low       Homicidal: Low       Self-Harm: Low       Relapse: Not applicable       Crisis Safety Plan Reviewed Not Indicated    MEDICAL RECORDS/LABS/DIAGNOSTIC TESTS REVIEWED:  Lab Results   Component Value Date/Time    WBC 10.8 05/25/2022 10:20 AM    RBC 4.90 05/25/2022 10:20 AM    HEMOGLOBIN 14.5 05/25/2022 10:20 AM    HEMATOCRIT 44.3 05/25/2022 10:20 AM    MCV 90.4 05/25/2022 10:20 AM    MCH 29.6 05/25/2022 10:20 AM    MCHC 32.7 (L)  05/25/2022 10:20 AM    MPV 10.9 05/25/2022 10:20 AM    NEUTSPOLYS 63.80 05/25/2022 10:20 AM    LYMPHOCYTES 30.80 05/25/2022 10:20 AM    MONOCYTES 3.60 05/25/2022 10:20 AM    EOSINOPHILS 0.80 05/25/2022 10:20 AM    BASOPHILS 0.50 05/25/2022 10:20 AM    HYPOCHROMIA 1+ 08/18/2010 11:35 AM       Lab Results   Component Value Date/Time    SODIUM 140 12/13/2021 06:07 AM    POTASSIUM 4.1 12/13/2021 06:07 AM    CHLORIDE 105 12/13/2021 06:07 AM    CO2 21 12/13/2021 06:07 AM    GLUCOSE 91 12/13/2021 06:07 AM    BUN 17 12/13/2021 06:07 AM    CREATININE 0.60 12/13/2021 06:07 AM       Lab Results   Component Value Date/Time    CHOLSTRLTOT 175 07/17/2019 07:30 AM     (H) 07/17/2019 07:30 AM    HDL 43 07/17/2019 07:30 AM    TRIGLYCERIDE 85 07/17/2019 07:30 AM       No results found for: HBA1C    Lab Results   Component Value Date/Time    CHOLSTRLTOT 175 07/17/2019 07:30 AM     (H) 07/17/2019 07:30 AM    HDL 43 07/17/2019 07:30 AM    TRIGLYCERIDE 85 07/17/2019 07:30 AM       Lab Results   Component Value Date/Time    ALKPHOSPHAT 107 (H) 12/13/2021 06:07 AM    ASTSGOT 17 12/13/2021 06:07 AM    ALTSGPT 25 12/13/2021 06:07 AM    TBILIRUBIN 0.4 12/13/2021 06:07 AM       No results found for: LITHIUM  Lab Results   Component Value Date/Time    TSHULTRASEN 2.120 05/25/2022 1020     Lab Results   Component Value Date/Time    FREET4 0.65 04/29/2017 0751     Lab Results   Component Value Date/Time    FREET3 2.8 10/16/2013 0822           [place]  records -   Reviewed    DIFFERENTIAL DIAGNOSES  Generalized anxiety disorder  Major depressive disorder      ASSESSMENT  This is a 37 y.o. old female with a history of anxiety and depression who presents to clinic to establish with new psychiatric provider today.  Patient has dealt with anxiety and depression for most of her life, however these have both been exacerbated recently with the passing of her stepmom unexpectedly in August of this year from a heart attack.  This caught her by  surprise, and she has since been incredibly anxious specifically with regards to the death of people and things that she cares about, and has been specifically hyper focused on the death of her cat.  Her fear and anxiety surrounding the death of her cat has altered her typical behavior, she has neglected some self-care type of appointments due to the heightened anxiety.  Has not, as of yet, prove detrimental to her work performance.  Patient has not engaged in psychotherapy at a therapeutic level or consistency up to this point.  Strongly encouraged and recommended patient to adhere to the schedule that is currently being provided by her psychotherapist which she has met with for 6 weeks now.  I think this is a missing aspect of the treatment of her anxiety and depression.  Much of what patient is experiencing is an exacerbation of underlying anxiety and depression due to typical stress from death of a loved 1.  Patient has had benefit from Prozac therapy, and has trialed other antidepressants for adequate amounts of time and adequate dosages that at this time do not feel it requisite to make wholesale changes to her current antidepressant/anxiolytic therapy.  Therefore we will consider at this time adjunctive medication in buspirone, as she has not trialed this medication.  We will initiate trial today, patient to continue for 5 weeks in combination with psychotherapy and present to clinic again for reassessment.  Patient has not trialed SNRI class of medications, which can also be beneficial to anxiety.  If anxiety persists may need to consider that he is therapeutic options..    PLAN:  (1) continue Prozac 40 mg daily for anxiety and mood  (2) start buspirone 7.5 mg twice daily for 7 days, then increase to 15 mg twice daily for 7 days, then increase to 15 mg 3 times daily  (3) continue to engage in weekly psychotherapy    Medication options, alternatives (including no medications) and medication  risks/benefits/side effects were discussed in detail.  Explained importance of contraceptive measures while on psychotropic medications, educated to let provider know if ever pregnant or wanting to become pregnant. Verbalized understanding.  The patient was advised to call, message provider on MyChart, or come in to the clinic if symptoms worsen or if any future questions/issues regarding their medications arise; the patient verbalized understanding and agreement.    The patient was educated to call 911, call the suicide hotline, or go to local ER if having thoughts of suicide or homicide; verbalized understanding.  I have discussed with the patient/authorized legal representative the risks, benefits and alternatives to treatment and the patient has verbalized agreement with the plan. Case discussed with the attending physician, who was present for critical components of the appointment.         Return to clinic in 5 weeks or sooner if symptoms worsen.  Next Appointment:  instruction provided on how to make the next appointment.     The proposed treatment plan was discussed with the patient who was provided the opportunity to ask questions and make suggestions regarding alternative treatment. Patient verbalized understanding and expressed agreement with the plan.     Thank you for allowing me to participate in the care of this patient.    Haris Dominguez D.O.  12/20/22    CC:   EDIL Christianson    This note was created using voice recognition software (Dragon). The accuracy of the dictation is limited by the abilities of the software. I have reviewed the note prior to signing, however some errors in grammar and context are still possible. If you have any questions related to this note please do not hesitate to contact our office.

## 2023-01-24 ENCOUNTER — OFFICE VISIT (OUTPATIENT)
Dept: BEHAVIORAL HEALTH | Facility: CLINIC | Age: 38
End: 2023-01-24
Payer: COMMERCIAL

## 2023-01-24 DIAGNOSIS — F33.41 RECURRENT MAJOR DEPRESSIVE DISORDER, IN PARTIAL REMISSION (HCC): ICD-10-CM

## 2023-01-24 DIAGNOSIS — F41.9 ANXIETY: ICD-10-CM

## 2023-01-24 PROCEDURE — 99214 OFFICE O/P EST MOD 30 MIN: CPT | Performed by: PSYCHIATRY & NEUROLOGY

## 2023-01-24 RX ORDER — BUSPIRONE HYDROCHLORIDE 15 MG/1
15 TABLET ORAL 3 TIMES DAILY
Qty: 270 TABLET | Refills: 0 | Status: SHIPPED | OUTPATIENT
Start: 2023-01-24 | End: 2023-03-28 | Stop reason: SDUPTHER

## 2023-01-24 NOTE — PROGRESS NOTES
"  PSYCHIATRY FOLLOW-UP NOTE      Name: Nataliia Merida  MRN: [id number]  : 1985  Age: 37 y.o.  Date of assessment: 2023  PCP: EDIL Christianson  Persons in attendance: Patient      REASON FOR VISIT/CHIEF COMPLAINT (as stated by Patient):  Nataliia Merida is a 37 y.o., White female, attending follow-up appointment for anxiety management.      SUBJECTIVE/HPI  Nataliia Merida is a 37 y.o. old female with anxiety who comes in today for follow up. Patient was last seen on 2022, at which time the plan was to: Continue Prozac, start buspirone, and engage in weekly therapy.    Patient began with low-dose of buspirone.  Reports that she titrated up according to our plan.  Reports that she did not notice any side effects from the medication, she denies GI distress, headaches, or other adverse physical event.  Reports that she is currently taking 15 mg 3 times a day, she has an alarm set on her phone every 5 hours to remind her when to take it.  Reports that she also takes it with some food each time.  Reports a couple times and she did not take with food she noticed more anxiety after.  Reports that her anxiety is \"definitely less\" than it was before, she has been able to go about her life and a more fulfilling manner.  She has been engaging in weekly psychotherapy with her therapist virtually.  This has been very helpful for her.  She reports that the combination of medication and weekly therapy has been helping her improve her overall level of anxiety and mood.  The holidays went well, this was the first holiday period without her mother around who , she was surprised at how smoothly it went, although she did find herself sad at times.  Reports that her mood has been good and stable.  Has an ablation scheduled for this Friday for endometriosis, she is not overly anxious about this procedure and is looking forward to it as it may help her feel better.        CURRENT " MEDICATIONS:  Current Outpatient Medications   Medication Sig Dispense Refill    busPIRone (BUSPAR) 15 MG tablet Take 1 Tablet by mouth 3 times a day for 90 days. Take with food. 270 Tablet 0    fluoxetine (PROZAC) 40 MG capsule Take 1 Capsule by mouth every day. 90 Capsule 3     No current facility-administered medications for this visit.       MEDICAL HISTORY  Past Medical History:   Diagnosis Date    Abnormal Pap smear     Anxiety     Anxiety disorder 02/08/2022    medicated    Dental disorder 02/08/2022    lower permanent retainer    Depression 10/19/2009    Hypertension     10 years ago but resolved    Migraine     Tension headache      Past Surgical History:   Procedure Laterality Date    STEREOTACTIC COMPUTER ASSISTED PROCEDURE CRANIAL, EXTRADURAL N/A 2/10/2022    Procedure: STEREOTACTIC COMPUTER ASSISTED PROCEDURE CRANIAL,EXTRADURAL;  Surgeon: Art Smiley M.D.;  Location: SURGERY SAME DAY [facility];  Service: Ent    ENDOSCOPY, PARANASAL SINUS, WITH TOTAL ETHMOIDECTOMY, SN Right 2/10/2022    Procedure: ENDOSCOPY, PARANASAL SINUS, WITH TOTAL ETHMOIDECTOMY, MAXILLARY ANTROSTOMY, EXPLORATION FRONTAL SINUS;  Surgeon: Art Smiley M.D.;  Location: SURGERY SAME DAY [facility];  Service: Ent     ALLERGIES:  Nkda [no known drug allergy]     PAST PSYCHIATRIC HISTORY  Prior psychiatric hospitalization: Hospitalized in 2010 at [facility], suicidal ideation with plan, did not attempt  Prior Self harm/suicide attempt: Cutting when she was 18 years old, does not self-harm anymore  Prior Diagnosis: Anxiety, depression  Has never been consistent with psychotherapy, frequently attends 1-2 sessions and stopped     PAST PSYCHIATRIC MEDICATIONS  Lexapro, to 20 mg  Sertraline, to 150 mg, had sexual side effects  Lithium, for 3 months after her hospitalization, stopped because she was never manic  Wellbutrin  Xanax, does not want to become dependent  Hydroxyzine, did not impact her anxiety  Doxepin, 6 mg  Trazodone     FAMILY  HISTORY  Psychiatric diagnosis: Brother had Sky's disease,  at age 29.  Grandma had depression.  Uncle had bipolar disorder  History of suicide attempts: Grandma suicide, uncle suicide  Substance abuse history: Denies     SUBSTANCE USE HISTORY:  ALCOHOL: Denies  TOBACCO: Denies  CANNABIS: Smokes nightly, 3 to 4 puffs of flower to help her relax before bed.  Has been doing this for 2 years  OPIOIDS: Denies  PRESCRIPTION MEDICATIONS: Denies  OTHERS: Denies  History of inpatient/outpatient rehab treatment: Denies        SOCIAL HISTORY  Childhood: born in [place] and describes childhood as good  Education: Graduated [employer]  in Special Education: no  Intellectual Disability: no  Employment: Works at [employer] and [employer]  Relationship:   Kids: Has had 2 children, both were placed for adoption  Current living situation: Lives with   Current/past legal issues: no  History of emotional/physical/sexual abuse - no   History: no  Spiritual/Islam affiliation: Is very active in her [employer] organization      REVIEW OF SYSTEMS:        Constitutional negative   Eyes negative   Ears/Nose/Mouth/Throat negative   Cardiovascular negative   Respiratory negative   Gastrointestinal negative   Genitourinary negative   Muscular negative   Integumentary negative   Neurological negative   Endocrine negative   Hematologic/Lymphatic negative     PHYSICAL EXAMINATION:  Vital signs: There were no vitals taken for this visit.  Musculoskeletal: Normal gait.   Abnormal movements: no      MENTAL STATUS EXAMINATION      General:   - Grooming and hygiene: Good and Casual,   - Apparent distress: no,   - Behavior: Calm  - Eye Contact:  Good,   - no psychomotor agitation or retardation    - Participation: Active verbal participation  Orientation: Alert and Fully Oriented to person, place and time  Mood: Euthymic  Affect: Flexible and Congruent with content,  Thought Process: Logical and  Goal-directed  Thought Content: Denies suicidal or homicidal ideations, intent or plan Within normal limits  Perception: Denies auditory or visual hallucinations. No delusions noted Within normal limits  Attention span and concentration: Intact   Speech:Rate within normal limits and Volume within normal limits  Language: Appropriate   Insight: Good  Judgment: Good  Recent and remote memory: No gross evidence of memory deficits        DEPRESSION SCREENIN/3/2019 2021 2022   Depression Screen (PHQ-2/PHQ-9)   PHQ-2 Total Score 0 0    PHQ-2 Total Score   3   PHQ-9 Total Score 9 4    PHQ-9 Total Score   10       Multiple values from one day are sorted in reverse-chronological order       Interpretation of PHQ-9 Total Score   Score Severity   1-4 No Depression   5-9 Mild Depression   10-14 Moderate Depression   15-19 Moderately Severe Depression   20-27 Severe Depression    CURRENT RISK:       Suicidal: Low       Homicidal: Low       Self-Harm: Low       Relapse: Not applicable       Crisis Safety Plan Reviewed Not Indicated       If evidence of imminent risk is present, intervention/plan:      MEDICAL RECORDS/LABS/DIAGNOSTIC TESTS REVIEWED:  No new lab since last visit     [place]  records -   Reviewed       ASSESSMENT:  37-year-old female with history of anxiety and depression who presents for follow-up for ongoing management of medication.  Patient was started on buspirone at last visit and titrated to 15 mg 3 times daily.  Patient has had good response to medication, no notable side effects reported.  Overall there has been a reduction in heightened emotional sensitivity in reaction to anxiety.  This is allowed her to better engage in psychotherapy, which she is attending 1 time per week now.  Reduced anxiety levels have allowed her to engage more fully in her life, she is engaging with other appropriate medical care providers, is having follow-up appointments and procedures scheduled and does not feel  overly anxious about them.  Would anticipate that with continuation of this medication regimen, in combination with continued psychotherapy, and addressing her other physical issues of pain and discomfort that she will have a good and sustained response to improvement in anxiety and mood.  We will plan to continue medications as is today, follow up in 2 months to reassess.  Can reassess at that time if patient has been stable the plan potential for tapering from buspirone.    DDX:  1.  Generalized anxiety disorder  2.  Major depressive disorder    PLAN:  (1) continue Prozac 40 mg daily for anxiety and mood  (2) continue buspirone 15 mg 3 times daily for anxiety, continue to take with food  (3) continue to engage in weekly psychotherapy    Medication options, alternatives (including no medications) and medication risks/benefits/side effects were discussed in detail.  Explained importance of contraceptive measures while on psychotropic medications, educated to let provider know if ever pregnant or wanting to become pregnant. Verbalized understanding.  The patient was advised to call, message provider on [employer], or come in to the clinic if symptoms worsen or if any future questions/issues regarding their medications arise; the patient verbalized understanding and agreement.  The patient was educated to call 911, call the suicide hotline, or go to local ER if having thoughts of suicide or homicide; verbalized understanding.      Return to clinic in 2 months or sooner if symptoms worsen.  Next Appointment: instruction provided on how to make the next appointment.     The proposed treatment plan was discussed with the patient who was provided the opportunity to ask questions and make suggestions regarding alternative treatment. Patient verbalized understanding and expressed agreement with the plan.       Haris Dominguez D.O.  01/24/23    This note was created using voice recognition software (Dragon). The accuracy of the  dictation is limited by the abilities of the software. I have reviewed the note prior to signing, however some errors in grammar and context are still possible. If you have any questions related to this note please do not hesitate to contact our office.

## 2023-01-25 ENCOUNTER — PRE-ADMISSION TESTING (OUTPATIENT)
Dept: ADMISSIONS | Facility: MEDICAL CENTER | Age: 38
End: 2023-01-25
Attending: OBSTETRICS & GYNECOLOGY
Payer: COMMERCIAL

## 2023-01-25 RX ORDER — GALCANEZUMAB 120 MG/ML
INJECTION, SOLUTION SUBCUTANEOUS
COMMUNITY
Start: 2023-01-09 | End: 2023-08-18 | Stop reason: SDUPTHER

## 2023-01-27 ENCOUNTER — ANESTHESIA (OUTPATIENT)
Dept: SURGERY | Facility: MEDICAL CENTER | Age: 38
End: 2023-01-27
Payer: COMMERCIAL

## 2023-01-27 ENCOUNTER — ANESTHESIA EVENT (OUTPATIENT)
Dept: SURGERY | Facility: MEDICAL CENTER | Age: 38
End: 2023-01-27
Payer: COMMERCIAL

## 2023-01-27 ENCOUNTER — HOSPITAL ENCOUNTER (OUTPATIENT)
Facility: MEDICAL CENTER | Age: 38
End: 2023-01-27
Attending: OBSTETRICS & GYNECOLOGY | Admitting: OBSTETRICS & GYNECOLOGY
Payer: COMMERCIAL

## 2023-01-27 VITALS
BODY MASS INDEX: 40.49 KG/M2 | HEIGHT: 69 IN | RESPIRATION RATE: 16 BRPM | HEART RATE: 79 BPM | DIASTOLIC BLOOD PRESSURE: 118 MMHG | WEIGHT: 273.37 LBS | OXYGEN SATURATION: 92 % | TEMPERATURE: 97 F | SYSTOLIC BLOOD PRESSURE: 170 MMHG

## 2023-01-27 PROBLEM — N92.1 MENOMETRORRHAGIA: Status: ACTIVE | Noted: 2023-01-27

## 2023-01-27 LAB — HCG UR QL: NEGATIVE

## 2023-01-27 PROCEDURE — 160009 HCHG ANES TIME/MIN: Performed by: OBSTETRICS & GYNECOLOGY

## 2023-01-27 PROCEDURE — 700101 HCHG RX REV CODE 250: Performed by: OBSTETRICS & GYNECOLOGY

## 2023-01-27 PROCEDURE — 160046 HCHG PACU - 1ST 60 MINS PHASE II: Performed by: OBSTETRICS & GYNECOLOGY

## 2023-01-27 PROCEDURE — 160048 HCHG OR STATISTICAL LEVEL 1-5: Performed by: OBSTETRICS & GYNECOLOGY

## 2023-01-27 PROCEDURE — A9270 NON-COVERED ITEM OR SERVICE: HCPCS | Performed by: OBSTETRICS & GYNECOLOGY

## 2023-01-27 PROCEDURE — 160002 HCHG RECOVERY MINUTES (STAT): Performed by: OBSTETRICS & GYNECOLOGY

## 2023-01-27 PROCEDURE — 160041 HCHG SURGERY MINUTES - EA ADDL 1 MIN LEVEL 4: Performed by: OBSTETRICS & GYNECOLOGY

## 2023-01-27 PROCEDURE — 700111 HCHG RX REV CODE 636 W/ 250 OVERRIDE (IP): Performed by: ANESTHESIOLOGY

## 2023-01-27 PROCEDURE — 160035 HCHG PACU - 1ST 60 MINS PHASE I: Performed by: OBSTETRICS & GYNECOLOGY

## 2023-01-27 PROCEDURE — 81025 URINE PREGNANCY TEST: CPT

## 2023-01-27 PROCEDURE — 700105 HCHG RX REV CODE 258: Performed by: OBSTETRICS & GYNECOLOGY

## 2023-01-27 PROCEDURE — 700102 HCHG RX REV CODE 250 W/ 637 OVERRIDE(OP): Performed by: OBSTETRICS & GYNECOLOGY

## 2023-01-27 PROCEDURE — 700101 HCHG RX REV CODE 250: Performed by: ANESTHESIOLOGY

## 2023-01-27 PROCEDURE — 00952 ANES VAG PX HYSTSC&/HSG: CPT | Performed by: ANESTHESIOLOGY

## 2023-01-27 PROCEDURE — 160029 HCHG SURGERY MINUTES - 1ST 30 MINS LEVEL 4: Performed by: OBSTETRICS & GYNECOLOGY

## 2023-01-27 PROCEDURE — 160047 HCHG PACU  - EA ADDL 30 MINS PHASE II: Performed by: OBSTETRICS & GYNECOLOGY

## 2023-01-27 PROCEDURE — 160025 RECOVERY II MINUTES (STATS): Performed by: OBSTETRICS & GYNECOLOGY

## 2023-01-27 RX ORDER — BUPIVACAINE HYDROCHLORIDE AND EPINEPHRINE 5; 5 MG/ML; UG/ML
INJECTION, SOLUTION EPIDURAL; INTRACAUDAL; PERINEURAL
Status: DISCONTINUED | OUTPATIENT
Start: 2023-01-27 | End: 2023-01-27 | Stop reason: HOSPADM

## 2023-01-27 RX ORDER — SODIUM CHLORIDE, SODIUM LACTATE, POTASSIUM CHLORIDE, CALCIUM CHLORIDE 600; 310; 30; 20 MG/100ML; MG/100ML; MG/100ML; MG/100ML
INJECTION, SOLUTION INTRAVENOUS CONTINUOUS
Status: DISCONTINUED | OUTPATIENT
Start: 2023-01-27 | End: 2023-01-27 | Stop reason: HOSPADM

## 2023-01-27 RX ORDER — DEXAMETHASONE SODIUM PHOSPHATE 4 MG/ML
INJECTION, SOLUTION INTRA-ARTICULAR; INTRALESIONAL; INTRAMUSCULAR; INTRAVENOUS; SOFT TISSUE PRN
Status: DISCONTINUED | OUTPATIENT
Start: 2023-01-27 | End: 2023-01-27 | Stop reason: SURG

## 2023-01-27 RX ORDER — CEFAZOLIN SODIUM 1 G/3ML
INJECTION, POWDER, FOR SOLUTION INTRAMUSCULAR; INTRAVENOUS PRN
Status: DISCONTINUED | OUTPATIENT
Start: 2023-01-27 | End: 2023-01-27 | Stop reason: SURG

## 2023-01-27 RX ORDER — ONDANSETRON 2 MG/ML
INJECTION INTRAMUSCULAR; INTRAVENOUS PRN
Status: DISCONTINUED | OUTPATIENT
Start: 2023-01-27 | End: 2023-01-27 | Stop reason: SURG

## 2023-01-27 RX ORDER — HYDROMORPHONE HYDROCHLORIDE 1 MG/ML
0.4 INJECTION, SOLUTION INTRAMUSCULAR; INTRAVENOUS; SUBCUTANEOUS
Status: DISCONTINUED | OUTPATIENT
Start: 2023-01-27 | End: 2023-01-27 | Stop reason: HOSPADM

## 2023-01-27 RX ORDER — DIPHENHYDRAMINE HYDROCHLORIDE 50 MG/ML
6.25 INJECTION INTRAMUSCULAR; INTRAVENOUS
Status: DISCONTINUED | OUTPATIENT
Start: 2023-01-27 | End: 2023-01-27 | Stop reason: HOSPADM

## 2023-01-27 RX ORDER — OXYCODONE HCL 5 MG/5 ML
5 SOLUTION, ORAL ORAL
Status: DISCONTINUED | OUTPATIENT
Start: 2023-01-27 | End: 2023-01-27 | Stop reason: HOSPADM

## 2023-01-27 RX ORDER — BUPIVACAINE HYDROCHLORIDE AND EPINEPHRINE 5; 5 MG/ML; UG/ML
INJECTION, SOLUTION EPIDURAL; INTRACAUDAL; PERINEURAL
Status: DISCONTINUED
Start: 2023-01-27 | End: 2023-01-27 | Stop reason: HOSPADM

## 2023-01-27 RX ORDER — HALOPERIDOL 5 MG/ML
1 INJECTION INTRAMUSCULAR
Status: DISCONTINUED | OUTPATIENT
Start: 2023-01-27 | End: 2023-01-27 | Stop reason: HOSPADM

## 2023-01-27 RX ORDER — LIDOCAINE HYDROCHLORIDE 20 MG/ML
INJECTION, SOLUTION EPIDURAL; INFILTRATION; INTRACAUDAL; PERINEURAL PRN
Status: DISCONTINUED | OUTPATIENT
Start: 2023-01-27 | End: 2023-01-27 | Stop reason: SURG

## 2023-01-27 RX ORDER — ALBUTEROL SULFATE 2.5 MG/3ML
2.5 SOLUTION RESPIRATORY (INHALATION)
Status: DISCONTINUED | OUTPATIENT
Start: 2023-01-27 | End: 2023-01-27 | Stop reason: HOSPADM

## 2023-01-27 RX ORDER — MEPERIDINE HYDROCHLORIDE 25 MG/ML
12.5 INJECTION INTRAMUSCULAR; INTRAVENOUS; SUBCUTANEOUS
Status: DISCONTINUED | OUTPATIENT
Start: 2023-01-27 | End: 2023-01-27 | Stop reason: HOSPADM

## 2023-01-27 RX ORDER — GABAPENTIN 300 MG/1
300 CAPSULE ORAL ONCE
Status: COMPLETED | OUTPATIENT
Start: 2023-01-27 | End: 2023-01-27

## 2023-01-27 RX ORDER — HYDROMORPHONE HYDROCHLORIDE 1 MG/ML
0.2 INJECTION, SOLUTION INTRAMUSCULAR; INTRAVENOUS; SUBCUTANEOUS
Status: DISCONTINUED | OUTPATIENT
Start: 2023-01-27 | End: 2023-01-27 | Stop reason: HOSPADM

## 2023-01-27 RX ORDER — HYDROMORPHONE HYDROCHLORIDE 1 MG/ML
0.1 INJECTION, SOLUTION INTRAMUSCULAR; INTRAVENOUS; SUBCUTANEOUS
Status: DISCONTINUED | OUTPATIENT
Start: 2023-01-27 | End: 2023-01-27 | Stop reason: HOSPADM

## 2023-01-27 RX ORDER — HYDRALAZINE HYDROCHLORIDE 20 MG/ML
5 INJECTION INTRAMUSCULAR; INTRAVENOUS
Status: DISCONTINUED | OUTPATIENT
Start: 2023-01-27 | End: 2023-01-27 | Stop reason: HOSPADM

## 2023-01-27 RX ORDER — ONDANSETRON 2 MG/ML
4 INJECTION INTRAMUSCULAR; INTRAVENOUS
Status: COMPLETED | OUTPATIENT
Start: 2023-01-27 | End: 2023-01-27

## 2023-01-27 RX ORDER — CELECOXIB 200 MG/1
400 CAPSULE ORAL ONCE
Status: COMPLETED | OUTPATIENT
Start: 2023-01-27 | End: 2023-01-27

## 2023-01-27 RX ORDER — PROMETHAZINE HYDROCHLORIDE 25 MG/1
12.5 SUPPOSITORY RECTAL EVERY 4 HOURS PRN
Status: DISCONTINUED | OUTPATIENT
Start: 2023-01-27 | End: 2023-01-27 | Stop reason: HOSPADM

## 2023-01-27 RX ORDER — OXYCODONE HCL 5 MG/5 ML
10 SOLUTION, ORAL ORAL
Status: DISCONTINUED | OUTPATIENT
Start: 2023-01-27 | End: 2023-01-27 | Stop reason: HOSPADM

## 2023-01-27 RX ORDER — ACETAMINOPHEN 500 MG
1000 TABLET ORAL ONCE
Status: COMPLETED | OUTPATIENT
Start: 2023-01-27 | End: 2023-01-27

## 2023-01-27 RX ADMIN — LIDOCAINE HYDROCHLORIDE 80 MG: 20 INJECTION, SOLUTION EPIDURAL; INFILTRATION; INTRACAUDAL at 14:32

## 2023-01-27 RX ADMIN — DEXAMETHASONE SODIUM PHOSPHATE 8 MG: 4 INJECTION, SOLUTION INTRA-ARTICULAR; INTRALESIONAL; INTRAMUSCULAR; INTRAVENOUS; SOFT TISSUE at 14:37

## 2023-01-27 RX ADMIN — FENTANYL CITRATE 50 MCG: 50 INJECTION, SOLUTION INTRAMUSCULAR; INTRAVENOUS at 14:49

## 2023-01-27 RX ADMIN — CEFAZOLIN 3 G: 330 INJECTION, POWDER, FOR SOLUTION INTRAMUSCULAR; INTRAVENOUS at 14:32

## 2023-01-27 RX ADMIN — FENTANYL CITRATE 50 MCG: 50 INJECTION, SOLUTION INTRAMUSCULAR; INTRAVENOUS at 14:32

## 2023-01-27 RX ADMIN — SODIUM CHLORIDE, POTASSIUM CHLORIDE, SODIUM LACTATE AND CALCIUM CHLORIDE: 600; 310; 30; 20 INJECTION, SOLUTION INTRAVENOUS at 14:28

## 2023-01-27 RX ADMIN — PROPOFOL 200 MG: 10 INJECTION, EMULSION INTRAVENOUS at 14:32

## 2023-01-27 RX ADMIN — FENTANYL CITRATE 50 MCG: 50 INJECTION, SOLUTION INTRAMUSCULAR; INTRAVENOUS at 15:01

## 2023-01-27 RX ADMIN — SODIUM CHLORIDE, POTASSIUM CHLORIDE, SODIUM LACTATE AND CALCIUM CHLORIDE: 600; 310; 30; 20 INJECTION, SOLUTION INTRAVENOUS at 15:07

## 2023-01-27 RX ADMIN — GABAPENTIN 300 MG: 300 CAPSULE ORAL at 13:16

## 2023-01-27 RX ADMIN — ONDANSETRON 4 MG: 2 INJECTION INTRAMUSCULAR; INTRAVENOUS at 14:58

## 2023-01-27 RX ADMIN — CELECOXIB 400 MG: 200 CAPSULE ORAL at 13:16

## 2023-01-27 RX ADMIN — ACETAMINOPHEN 1000 MG: 500 TABLET ORAL at 13:16

## 2023-01-27 RX ADMIN — ONDANSETRON HYDROCHLORIDE 4 MG: 2 SOLUTION INTRAMUSCULAR; INTRAVENOUS at 16:41

## 2023-01-27 ASSESSMENT — PAIN DESCRIPTION - PAIN TYPE
TYPE: SURGICAL PAIN

## 2023-01-27 ASSESSMENT — FIBROSIS 4 INDEX: FIB4 SCORE: 0.34

## 2023-01-27 NOTE — ANESTHESIA PROCEDURE NOTES
Airway    Date/Time: 1/27/2023 2:32 PM  Performed by: Lacho De Los Santos M.D.  Authorized by: Lacho De Los Santos M.D.     Location:  OR  Urgency:  Elective  Difficult Airway: No    Indications for Airway Management:  Anesthesia      Spontaneous Ventilation: absent    Sedation Level:  Deep  Preoxygenated: Yes    Mask Difficulty Assessment:  0 - not attempted  Final Airway Type:  Supraglottic airway  Final Supraglottic Airway:  Standard LMA    SGA Size:  4  Number of Attempts at Approach:  1

## 2023-01-27 NOTE — ANESTHESIA PREPROCEDURE EVALUATION
Case: [id number] Date/Time: 01/27/23 1415    Procedure: ENDOMETRIAL ABLATION DIAGNOSTIC HYSTEROSCOPY    Pre-op diagnosis: MENOMETRORRHAGIA    Location: [facility] ROOM 25 / SURGERY SAME DAY [facility]    Surgeons: Tyler Fischer M.D.        Past Medical History:   Diagnosis Date   • Abnormal Pap smear    • Anxiety    • Anxiety disorder 02/08/2022    medicated   • Dental disorder 02/08/2022    lower permanent retainer   • Depression 10/19/2009   • Hypertension     10 years ago but resolved   • Migraine    • Tension headache        Relevant Problems   NEURO   (positive) Chronic migraine      CARDIAC   (positive) Chronic migraine      Other   (positive) Anxiety   (positive) BMI 40.0-44.9, adult (HCC)       Physical Exam    Airway   Mallampati: II  TM distance: >3 FB  Neck ROM: full       Cardiovascular - normal exam  Rhythm: regular  Rate: normal  (-) murmur     Dental - normal exam           Pulmonary - normal exam  Breath sounds clear to auscultation     Abdominal    Neurological - normal exam                 Anesthesia Plan    ASA 3   ASA physical status 3 criteria: morbid obesity - BMI greater than or equal to 40    Plan - general       Airway plan will be LMA          Induction: intravenous    Postoperative Plan: Postoperative administration of opioids is intended.    Pertinent diagnostic labs and testing reviewed    Informed Consent:    Anesthetic plan and risks discussed with patient.

## 2023-01-27 NOTE — OR NURSING
1514- Pt to PACU from OR. Report from anesthesia and OR RN. On 6L O2 via mask. Oral airway in place. Respirations even and unlabored. VSS.  Peripad in place with no bleeding noted    1530- Oral airway d/c'd    1545- Report to Rachna NORIEGA

## 2023-01-27 NOTE — DISCHARGE INSTRUCTIONS
HOME CARE INSTRUCTIONS    ACTIVITY: Rest and take it easy for the first 24 hours.  A responsible adult is recommended to remain with you during that time.  It is normal to feel sleepy.  We encourage you to not do anything that requires balance, judgment or coordination.    FOR 24 HOURS DO NOT:  Drive, operate machinery or run household appliances.  Drink beer or alcoholic beverages.  Make important decisions or sign legal documents.    DIET: To avoid nausea, slowly advance diet as tolerated, avoiding spicy or greasy foods for the first day.  Add more substantial food to your diet according to your physician's instructions.  INCREASE FLUIDS AND FIBER TO AVOID CONSTIPATION.    MEDICATIONS: Resume taking daily medication.  Take prescribed pain medication with food.  If no medication is prescribed, you may take non-aspirin pain medication if needed.  PAIN MEDICATION CAN BE VERY CONSTIPATING.  Take a stool softener or laxative such as senokot, pericolace, or milk of magnesia if needed.    Prescription given for N/A (take tylenol 1000mg and/or ibuprofen 600mg every 6 hours as needed for pain).  Last pain medication given at ___none__.    Celebrex (NSAID similar to ibuprofen/Motrin) given at 1:15pm. You may take ibuprofen if needed at 7:15pm.     Tylenol given at 1:15pm. You may take tylenol again if needed at 7:15pm.    A follow-up appointment should be arranged with your doctor in 1-2 weeks; call to schedule.    You should CALL YOUR PHYSICIAN if you develop:  Fever greater than 101 degrees F.  Pain not relieved by medication, or persistent nausea or vomiting.  Excessive bleeding (blood soaking through dressing) or unexpected drainage from the wound.  Extreme redness or swelling around the incision site, drainage of pus or foul smelling drainage.  Inability to urinate or empty your bladder within 8 hours.  Problems with breathing or chest pain.    You should call 911 if you develop problems with breathing or chest  pain.  If you are unable to contact your doctor or surgical center, you should go to the nearest emergency room or urgent care center.  Physician's telephone #: Dr. Fischer [phone]    MILD FLU-LIKE SYMPTOMS ARE NORMAL.  YOU MAY EXPERIENCE GENERALIZED MUSCLE ACHES, THROAT IRRITATION, HEADACHE AND/OR SOME NAUSEA.    If any questions arise, call your doctor.  If your doctor is not available, please feel free to call the Surgical Center at [phone].  The Center is open Monday through Friday from 7AM to 7PM.      A registered nurse may call you a few days after your surgery to see how you are doing after your procedure.    You may also receive a survey in the mail within the next two weeks and we ask that you take a few moments to complete the survey and return it to us.  Our goal is to provide you with very good care and we value your comments.

## 2023-01-27 NOTE — OR NURSING
1545 Report from April, RN, pt resting.     1600 No pain, no nausea.    1609 Off O2, meets phase II criteria, tolerating water.     1629 Reviewed discharge paperwork with pt and spouse. Discussed diet, activity, medications, follow up care and worsening symptoms. No questions at this time.     1641 Medicated for nausea.     1646 Vomited ~25 cc clear/yellow emesis. Feels better after vomiting.     1700 Dressed without assistance.     1725 Walked to bathroom, able to void.    1735 Discharge orders received. Meets discharge criteria at this time. Recheck BP after bathroom somewhat elevated, although better than pre op assessment, slightly lower when evaluated with manual cuff. Overall controlled throughout PACU stay, only elevated numbers were after ambulation/increasing nausea. Pt states pre op BP elevated d/t anxiety, currently denies chest pain, shortness of breath, arm pain, headache. Has mild nausea after movement. Discussed with pt to check with home BP cuff and to call primary if having issues with HTN or present to ED if symptomatic with elevated BP. Minimal pain. Tolerating PO. On RA. All lines and monitors discontinued. Pt to be discharged to home via private vehicle. Pt escorted out of department in wheelchair with RN, spouse, and all belongings including glasses.

## 2023-01-27 NOTE — ANESTHESIA TIME REPORT
Anesthesia Start and Stop Event Times     Date Time Event    1/27/2023 1422 Ready for Procedure     1428 Anesthesia Start     1515 Anesthesia Stop        Responsible Staff  01/27/23    Name Role Begin End    Lacho De Los Santos M.D. Anesth 1428 1515        Overtime Reason:  no overtime (within assigned shift)    Comments:

## 2023-01-27 NOTE — OR SURGEON
Immediate Post OP Note    PreOp Diagnosis: menometrorrhagia      PostOp Diagnosis: menometrorrhagia      Procedure(s):  ENDOMETRIAL ABLATION DIAGNOSTIC HYSTEROSCOPY - Wound Class: Clean Contaminated    Surgeon(s):  Tyler Fischer M.D.    Anesthesiologist/Type of Anesthesia:  Anesthesiologist: Lacho De Los Santos M.D./General    Surgical Staff:  Circulator: Tanya Reinoso R.N.  Relief Circulator: Yajaira Liu R.N.  Scrub Person: Raza White    Specimens removed if any:  * No specimens in log *    Estimated Blood Loss: 20 cc    Findings: normal uterine cavity    Complications: none        1/27/2023 3:16 PM Tyler Fischer M.D.

## 2023-01-28 NOTE — ANESTHESIA POSTPROCEDURE EVALUATION
Patient: Nataliia Merida    Procedure Summary     Date: 01/27/23 Room / Location: [facility] ROOM 25 / SURGERY SAME DAY [facility]    Anesthesia Start: 1428 Anesthesia Stop: 1515    Procedure: ENDOMETRIAL ABLATION DIAGNOSTIC HYSTEROSCOPY (Uterus) Diagnosis: (MENOMETRORRHAGIA)    Surgeons: Tyler Fischer M.D. Responsible Provider: Lacho De Los Santos M.D.    Anesthesia Type: general ASA Status: 3          Final Anesthesia Type: general  Last vitals  BP   Blood Pressure: (!) 147/100    Temp   36.4 °C (97.5 °F)    Pulse   71   Resp   16    SpO2   94 %      Anesthesia Post Evaluation    Patient location during evaluation: PACU  Patient participation: complete - patient participated  Level of consciousness: awake and alert    Airway patency: patent  Anesthetic complications: no  Cardiovascular status: hemodynamically stable  Respiratory status: acceptable  Hydration status: euvolemic    PONV: none          No notable events documented.     Nurse Pain Score: 0 (NPRS)

## 2023-01-28 NOTE — OP REPORT
DATE OF SERVICE:  2023     PROCEDURES:    1.  Diagnostic hysteroscopy.  2.  NovaSure radiofrequency endometrial ablation.     SURGEON:  Tyler Fischer MD     ASSISTANT:  None.     ANESTHESIOLOGIST:  Lacho De Los Santos MD     ANESTHESIA:  General.     PREOPERATIVE DIAGNOSIS:  Menometrorrhagia.     POSTOPERATIVE DIAGNOSIS:  Menometrorrhagia.     COMPLICATIONS:  None.     ESTIMATED BLOOD LOSS:  20 mL.     SPECIMENS SENT TO PATHOLOGY:  None.     INDICATIONS:  This 37-year-old lady is  2, para 2.  She came for   conservative surgical management of chronic socially crippling   menometrorrhagia.     DESCRIPTION OF PROCEDURE:   The patient went to the OR.  General anesthesia   was administered.  We placed her lower legs in padded Abdias stirrups with her   thighs slightly flexed.  Bimanual exam under anesthesia revealed no pelvic   masses and a small anteverted uterus.  She was prepped and draped.  Timeout   was done.  I placed a weighted speculum vaginally.  I applied traction to the   anterior cervix with a tenaculum.  I injected 10 mL of 0.5% Marcaine with   epinephrine as a paracervical block.  I meticulously measured the cervical   length at 3.0 cm.  I then gently dilated the internal os and meticulously   measured the entire uterine length at 9.5 cm with a sterile sound.  Thus, the   length of the uterine corpus was calculated to be 6.5 cm.     Diagnostic hysteroscopy with saline as the distending medium revealed a normal   uterine cavity with no evidence of submucous fibroids or polyps.  The uterus   distended appropriately with no evidence of perforation.  The hysteroscope was   removed.  I inserted the NovaSure device to 6.5 cm, retracted the sheath and   then meticulously measured the width of the uterine cavity at 4.5 cm.  We   entered length 6.5 cm and width 4.5 cm on the [employer] computer.  The wattage   calculated was 161 pemberton.  I tightly applied the cervical collar. The cavity   assessment test  passed on the first attempt.  The machine was enabled and   NovaSure radiofrequency ablation was accomplished with a total ablating time   of 79 seconds.  The device was retracted into the sheath and removed from the   patient and was intact.  Post-ablation, diagnostic hysteroscopy revealed a   globally ablated endometrial cavity with no evidence of perforation.  The   hysteroscope was removed.  I repaired, an anterior cervical laceration with   running 2-0 Vicryl.  At this point, there was excellent hemostasis.  Sponge   and needle counts were correct.        ______________________________  MD JOSELUIS Bruno/ALAN/OUMOU    DD:  01/27/2023 15:24  DT:  01/27/2023 16:06    Job#:  [id number]

## 2023-03-28 ENCOUNTER — OFFICE VISIT (OUTPATIENT)
Dept: BEHAVIORAL HEALTH | Facility: CLINIC | Age: 38
End: 2023-03-28
Payer: COMMERCIAL

## 2023-03-28 VITALS — BODY MASS INDEX: 40.46 KG/M2 | WEIGHT: 274 LBS

## 2023-03-28 DIAGNOSIS — F41.9 ANXIETY: ICD-10-CM

## 2023-03-28 DIAGNOSIS — F33.41 RECURRENT MAJOR DEPRESSIVE DISORDER, IN PARTIAL REMISSION (HCC): ICD-10-CM

## 2023-03-28 DIAGNOSIS — R53.83 FEELING TIRED: ICD-10-CM

## 2023-03-28 PROCEDURE — 99214 OFFICE O/P EST MOD 30 MIN: CPT | Performed by: PSYCHIATRY & NEUROLOGY

## 2023-03-28 RX ORDER — BUSPIRONE HYDROCHLORIDE 15 MG/1
15 TABLET ORAL 3 TIMES DAILY
Qty: 270 TABLET | Refills: 0 | Status: SHIPPED | OUTPATIENT
Start: 2023-03-28 | End: 2023-06-26

## 2023-03-28 RX ORDER — FLUOXETINE HYDROCHLORIDE 40 MG/1
40 CAPSULE ORAL DAILY
Qty: 90 CAPSULE | Refills: 3 | Status: SHIPPED | OUTPATIENT
Start: 2023-03-28 | End: 2023-08-01 | Stop reason: SDUPTHER

## 2023-03-28 ASSESSMENT — FIBROSIS 4 INDEX: FIB4 SCORE: 0.34

## 2023-03-28 NOTE — PROGRESS NOTES
"  PSYCHIATRY FOLLOW-UP NOTE      Name: Nataliia Merida  MRN: [id number]  : 1985  Age: 37 y.o.  Date of assessment: 23  PCP: EDIL Christianson  Persons in attendance: Patient      REASON FOR VISIT/CHIEF COMPLAINT (as stated by Patient):  Nataliia Merida is a 37 y.o., White female, attending follow-up appointment for anxiety management.      SUBJECTIVE/HPI  Nataliia Merida is a 37 y.o. old female with anxiety who comes in today for follow up. Patient was last seen on 23, at which time the plan was to: Continue Prozac, continue buspirone, and continue weekly therapy.    Patient is doing \"great\". Reports that her mood has been stable and quite good. Her anxiety has been minimal and well controlled. She has one incident of panic relating to her cat, where cat ended up having a brain tumor and needed to be put down, but reports that since cat was put down she has had little to no issues with anxiety. She is attending therapy biweekly now. Things are going well. She feels that she is in better control than she has been for some time. No SI. She has been fatigued and tired, feels this way throughout the day. Will fall asleep and take naps during daytime activities. Her  notes that she snores loudly, and she will often wake him up during sleep. She would like to have a sleep study and be referred to sleep medicine for follow up and treatment. Understands that first line treatment for sleep apnea is CPAP. She is not on blood pressure medication.     Patient would be open to consider reducing buspirone at next visit, or to trial reducing frequency of doses of buspirone today.        CURRENT MEDICATIONS:  Current Outpatient Medications   Medication Sig Dispense Refill    fluoxetine (PROZAC) 40 MG capsule Take 1 Capsule by mouth every day. 90 Capsule 3    busPIRone (BUSPAR) 15 MG tablet Take 1 Tablet by mouth 3 times a day for 90 days. Take with food. 270 Tablet 0    EMGALITY 120 MG/ML " Solution Auto-injector INJECT 1 SYRINGE SUBCUTANEOUSLY ONCE EVERY MONTH       No current facility-administered medications for this visit.       MEDICAL HISTORY  Past Medical History:   Diagnosis Date    Abnormal Pap smear     Anxiety     Anxiety disorder 02/08/2022    medicated    Dental disorder 02/08/2022    lower permanent retainer    Depression 10/19/2009    Hypertension     10 years ago but resolved    Migraine     Tension headache      Past Surgical History:   Procedure Laterality Date    UT HYSTEROSCOPY,W/ENDOMETRIAL ABLATION N/A 1/27/2023    Procedure: ENDOMETRIAL ABLATION DIAGNOSTIC HYSTEROSCOPY;  Surgeon: Tyler Fischer M.D.;  Location: SURGERY SAME DAY [facility];  Service: Gynecology    STEREOTACTIC COMPUTER ASSISTED PROCEDURE CRANIAL, EXTRADURAL N/A 2/10/2022    Procedure: STEREOTACTIC COMPUTER ASSISTED PROCEDURE CRANIAL,EXTRADURAL;  Surgeon: Art Smiley M.D.;  Location: SURGERY SAME DAY [facility];  Service: Ent    ENDOSCOPY, PARANASAL SINUS, WITH TOTAL ETHMOIDECTOMY, SN Right 2/10/2022    Procedure: ENDOSCOPY, PARANASAL SINUS, WITH TOTAL ETHMOIDECTOMY, MAXILLARY ANTROSTOMY, EXPLORATION FRONTAL SINUS;  Surgeon: Art Smiley M.D.;  Location: SURGERY SAME DAY [facility];  Service: Ent     ALLERGIES:  Nkda [no known drug allergy]     PAST PSYCHIATRIC HISTORY  Prior psychiatric hospitalization: Hospitalized in 2010 at [facility], suicidal ideation with plan, did not attempt  Prior Self harm/suicide attempt: Cutting when she was 18 years old, does not self-harm anymore  Prior Diagnosis: Anxiety, depression  Has never been consistent with psychotherapy, frequently attends 1-2 sessions and stopped     PAST PSYCHIATRIC MEDICATIONS  Lexapro, to 20 mg  Sertraline, to 150 mg, had sexual side effects  Lithium, for 3 months after her hospitalization, stopped because she was never manic  Wellbutrin  Xanax, does not want to become dependent  Hydroxyzine, did not impact her anxiety  Doxepin, 6 mg  Trazodone      FAMILY HISTORY  Psychiatric diagnosis: Brother had Sky's disease,  at age 29.  Grandma had depression.  Uncle had bipolar disorder  History of suicide attempts: Grandma suicide, uncle suicide  Substance abuse history: Denies     SUBSTANCE USE HISTORY:  ALCOHOL: Denies  TOBACCO: Denies  CANNABIS: Smokes nightly, 3 to 4 puffs of flower to help her relax before bed.  Has been doing this for 2 years  OPIOIDS: Denies  PRESCRIPTION MEDICATIONS: Denies  OTHERS: Denies  History of inpatient/outpatient rehab treatment: Denies        SOCIAL HISTORY  Childhood: born in [place] and describes childhood as good  Education: Graduated [employer] school  in Special Education: no  Intellectual Disability: no  Employment: Works at [employer] and [employer] authorization  Relationship:   Kids: Has had 2 children, both were placed for adoption  Current living situation: Lives with   Current/past legal issues: no  History of emotional/physical/sexual abuse - no   History: no  Spiritual/Orthodox affiliation: Is very active in her [employer] organization      REVIEW OF SYSTEMS:        Constitutional negative   Eyes negative   Ears/Nose/Mouth/Throat negative   Cardiovascular negative   Respiratory negative   Gastrointestinal negative   Genitourinary negative   Muscular negative   Integumentary negative   Neurological negative   Endocrine negative   Hematologic/Lymphatic negative     PHYSICAL EXAMINATION:  Vital signs: Wt 124 kg (274 lb)   BMI 40.46 kg/m²   Musculoskeletal: Normal gait.   Abnormal movements: no      MENTAL STATUS EXAMINATION      General:   - Grooming and hygiene: Good and Casual,   - Apparent distress: no,   - Behavior: Calm  - Eye Contact:  Good,   - no psychomotor agitation or retardation    - Participation: Active verbal participation  Orientation: Alert and Fully Oriented to person, place and time  Mood: Euthymic  Affect: Flexible and Congruent with content,  Thought Process: Logical and  Goal-directed  Thought Content: Denies suicidal or homicidal ideations, intent or plan Within normal limits  Perception: Denies auditory or visual hallucinations. No delusions noted Within normal limits  Attention span and concentration: Intact   Speech:Rate within normal limits and Volume within normal limits  Language: Appropriate   Insight: Good  Judgment: Good  Recent and remote memory: No gross evidence of memory deficits        DEPRESSION SCREENIN/3/2019    11:06 AM 2021     9:57 AM 2022     1:20 PM   Depression Screen (PHQ-2/PHQ-9)   PHQ-2 Total Score 0 0    PHQ-2 Total Score   3   PHQ-9 Total Score 9 4    PHQ-9 Total Score   10       Interpretation of PHQ-9 Total Score   Score Severity   1-4 No Depression   5-9 Mild Depression   10-14 Moderate Depression   15-19 Moderately Severe Depression   20-27 Severe Depression    Stop Bang score:  3 (3/28/2023 10:08 AM)  Yes to 2 or more of 4 STOP questions with BMI greater than 35.    CURRENT RISK:       Suicidal: Low       Homicidal: Low       Self-Harm: Low       Relapse: Not applicable       Crisis Safety Plan Reviewed Not Indicated       If evidence of imminent risk is present, intervention/plan:      MEDICAL RECORDS/LABS/DIAGNOSTIC TESTS REVIEWED:  No new lab since last visit     [place]  records -   Reviewed       ASSESSMENT:  37-year-old female with history of anxiety and depression who presents for follow-up for ongoing management of medication. Patient doing well on current medication regimen. No side effects, tolerating well. Mood and anxiety are stable and controlled respectively. No planned changes to regimen today. Considered decreasing buspirone, patient may trial reduced frequency of buspirone. Plan to reduce/taper at next visit. Patient with daytime fatigue and tiredness that has been ongoing for some time. Does not feel refreshed by sleep, YES to 2 or more of 4 STOP questions with BMI of 40. Risk of EMMANUEL, refer for sleep study and sleep  medicine consult for treatment, patient understands common treatment recommendations.    DDX:  1.  Generalized anxiety disorder  2.  Major depressive disorder  3. Fatigue    PLAN:  (1) continue Prozac 40 mg daily for anxiety and mood  (2) continue buspirone 15 mg 3 times daily for anxiety, continue to take with food  (3) continue to engage in biweekly psychotherapy  (4) referral to sleep medicine/sleep study    Medication options, alternatives (including no medications) and medication risks/benefits/side effects were discussed in detail.  Explained importance of contraceptive measures while on psychotropic medications, educated to let provider know if ever pregnant or wanting to become pregnant. Verbalized understanding.  The patient was advised to call, message provider on [place], or come in to the clinic if symptoms worsen or if any future questions/issues regarding their medications arise; the patient verbalized understanding and agreement.  The patient was educated to call 911, call the suicide hotline, or go to local ER if having thoughts of suicide or homicide; verbalized understanding.      Return to clinic in 2 months or sooner if symptoms worsen.  Next Appointment: instruction provided on how to make the next appointment.     The proposed treatment plan was discussed with the patient who was provided the opportunity to ask questions and make suggestions regarding alternative treatment. Patient verbalized understanding and expressed agreement with the plan.       Haris Dominguez D.O.  03/28/23    This note was created using voice recognition software (Dragon). The accuracy of the dictation is limited by the abilities of the software. I have reviewed the note prior to signing, however some errors in grammar and context are still possible. If you have any questions related to this note please do not hesitate to contact our office.

## 2023-04-10 ENCOUNTER — OFFICE VISIT (OUTPATIENT)
Dept: URGENT CARE | Facility: PHYSICIAN GROUP | Age: 38
End: 2023-04-10
Payer: COMMERCIAL

## 2023-04-10 VITALS
RESPIRATION RATE: 16 BRPM | WEIGHT: 281.31 LBS | SYSTOLIC BLOOD PRESSURE: 148 MMHG | OXYGEN SATURATION: 99 % | BODY MASS INDEX: 41.67 KG/M2 | HEART RATE: 87 BPM | DIASTOLIC BLOOD PRESSURE: 92 MMHG | TEMPERATURE: 97 F | HEIGHT: 69 IN

## 2023-04-10 DIAGNOSIS — J01.00 ACUTE NON-RECURRENT MAXILLARY SINUSITIS: ICD-10-CM

## 2023-04-10 PROCEDURE — 99213 OFFICE O/P EST LOW 20 MIN: CPT | Performed by: PHYSICIAN ASSISTANT

## 2023-04-10 RX ORDER — AMOXICILLIN AND CLAVULANATE POTASSIUM 875; 125 MG/1; MG/1
1 TABLET, FILM COATED ORAL 2 TIMES DAILY
Qty: 14 TABLET | Refills: 0 | Status: SHIPPED | OUTPATIENT
Start: 2023-04-10 | End: 2023-04-17

## 2023-04-10 ASSESSMENT — ENCOUNTER SYMPTOMS
CHILLS: 0
ABDOMINAL PAIN: 0
FEVER: 0
HEADACHES: 0
MYALGIAS: 0
DIZZINESS: 0
NAUSEA: 0
DIAPHORESIS: 0
SPUTUM PRODUCTION: 0
COUGH: 1
WHEEZING: 0
PALPITATIONS: 0
VOMITING: 0
SORE THROAT: 0
SHORTNESS OF BREATH: 0
SINUS PAIN: 1
DIARRHEA: 0

## 2023-04-10 ASSESSMENT — FIBROSIS 4 INDEX: FIB4 SCORE: 0.35

## 2023-04-10 NOTE — PROGRESS NOTES
Subjective:     CHIEF COMPLAINT  Chief Complaint   Patient presents with    Congestion     Cough x 1 week        HPI  Nataliia Merida is a very pleasant 38 y.o. female who presents to the clinic with chest congestion/pressure x1 week.  Currently experiencing pressure over the maxillary sinuses bilaterally.  Mucus is green in color.  She has a cough that is productive predominantly in the morning when she first wakes up.  No associated shortness of breath or chest pain.  Denies any fevers, chills or myalgias.  She has been taking Mucinex, Sudafed and using a Rupa pot for symptoms with no improvement.  No known ill contacts.    REVIEW OF SYSTEMS  Review of Systems   Constitutional:  Negative for chills, diaphoresis, fever and malaise/fatigue.   HENT:  Positive for congestion and sinus pain. Negative for ear pain and sore throat.    Respiratory:  Positive for cough. Negative for sputum production, shortness of breath and wheezing.    Cardiovascular:  Negative for chest pain and palpitations.   Gastrointestinal:  Negative for abdominal pain, diarrhea, nausea and vomiting.   Musculoskeletal:  Negative for myalgias.   Neurological:  Negative for dizziness and headaches.     PAST MEDICAL HISTORY  Patient Active Problem List    Diagnosis Date Noted    Menometrorrhagia 01/27/2023    Abnormal vaginal bleeding 11/17/2021    Acute maxillary sinusitis 11/17/2021    Fatigue 11/17/2021    Depression 07/02/2018    BMI 40.0-44.9, adult ([facility]) 03/10/2017    Chronic migraine 11/03/2015    Anxiety 01/23/2015       SURGICAL HISTORY   has a past surgical history that includes stereotactic computer assisted procedure cranial, extradural (N/A, 2/10/2022); endoscopy, paranasal sinus, with total ethmoidectomy, sn (Right, 2/10/2022); and hysteroscopy,w/endometrial ablation (N/A, 1/27/2023).    ALLERGIES  Allergies   Allergen Reactions    Nkda [No Known Drug Allergy]        CURRENT MEDICATIONS  Home Medications       Reviewed by Marc  "LEONARDO Harding (Physician Assistant) on 04/10/23 at 1229  Med List Status: <None>     Medication Last Dose Status   busPIRone (BUSPAR) 15 MG tablet Taking Active   EMGALITY 120 MG/ML Solution Auto-injector Taking Active   fluoxetine (PROZAC) 40 MG capsule Taking Active                    SOCIAL HISTORY  Social History     Tobacco Use    Smoking status: Never    Smokeless tobacco: Never   Vaping Use    Vaping Use: Never used   Substance and Sexual Activity    Alcohol use: No    Drug use: Yes     Types: Inhaled     Comment: marijuana for anxiety    Sexual activity: Yes     Partners: Male     Birth control/protection: Male Sterilization       FAMILY HISTORY  Family History   Problem Relation Age of Onset    No Known Problems Mother     No Known Problems Father     No Known Problems Sister     Other Brother         huntings disease    Sky's disease Brother     No Known Problems Maternal Grandmother     No Known Problems Maternal Grandfather     No Known Problems Paternal Grandmother     No Known Problems Paternal Grandfather     No Known Problems Brother     No Known Problems Sister           Objective:     VITAL SIGNS: BP (!) 148/92 (BP Location: Left arm, Patient Position: Sitting, BP Cuff Size: Adult)   Pulse 87   Temp 36.1 °C (97 °F) (Temporal)   Resp 16   Ht 1.753 m (5' 9\")   Wt (!) 128 kg (281 lb 4.9 oz)   SpO2 99%   BMI 41.54 kg/m²     PHYSICAL EXAM  Physical Exam  Constitutional:       General: She is not in acute distress.     Appearance: Normal appearance. She is not ill-appearing, toxic-appearing or diaphoretic.   HENT:      Head: Normocephalic and atraumatic.      Right Ear: Ear canal and external ear normal.      Left Ear: Ear canal and external ear normal.      Ears:      Comments: Bilateral serous middle ear effusion.     Nose: Congestion present. No rhinorrhea.      Mouth/Throat:      Mouth: Mucous membranes are moist.      Pharynx: No oropharyngeal exudate or posterior oropharyngeal " erythema.   Eyes:      Conjunctiva/sclera: Conjunctivae normal.   Cardiovascular:      Rate and Rhythm: Normal rate and regular rhythm.      Pulses: Normal pulses.      Heart sounds: Normal heart sounds.   Pulmonary:      Effort: Pulmonary effort is normal.      Breath sounds: Normal breath sounds. No wheezing.   Musculoskeletal:      Cervical back: Normal range of motion. No muscular tenderness.   Lymphadenopathy:      Cervical: No cervical adenopathy.   Skin:     General: Skin is warm and dry.      Capillary Refill: Capillary refill takes less than 2 seconds.   Neurological:      Mental Status: She is alert.   Psychiatric:         Mood and Affect: Mood normal.         Thought Content: Thought content normal.       Assessment/Plan:     1. Acute non-recurrent maxillary sinusitis  - amoxicillin-clavulanate (AUGMENTIN) 875-125 MG Tab; Take 1 Tablet by mouth 2 times a day for 7 days.  Dispense: 14 Tablet; Refill: 0      MDM/Comments:    -Nasal spray and allergy medications as directed (Zyrtec or Loratadine).  -You may try saline irrigation or neti pot.   -Drink plenty of fluids.  -Ibuprofen or Tylenol as directed for pain.   -Warm compress to sinuses.      Follow up with primary care provider. Urgently for worsening symptoms, persistent fevers, facial swelling, visual changes, weakness, elevated heart rate, stiff neck, symptoms last longer than 10 days, or any other concerns.    Differential diagnosis, natural history, supportive care, and indications for immediate follow-up discussed. All questions answered. Patient agrees with the plan of care.    Follow-up as needed if symptoms worsen or fail to improve to PCP, Urgent care or Emergency Room.    I have personally reviewed prior external notes and test results pertinent to today's visit.  I have independently reviewed and interpreted all diagnostics ordered during this urgent care acute visit.   Discussed management options (risks,benefits, and alternatives to  treatment). Pt expresses understanding and the treatment plan was agreed upon. Questions were encouraged and answered to pt's satisfaction.    Please note that this dictation was created using voice recognition software. I have made a reasonable attempt to correct obvious errors, but I expect that there are errors of grammar and possibly content that I did not discover before finalizing the note.

## 2023-04-24 ASSESSMENT — ENCOUNTER SYMPTOMS: SLEEP DISTURBANCE: 1

## 2023-04-27 ENCOUNTER — OFFICE VISIT (OUTPATIENT)
Dept: SLEEP MEDICINE | Facility: MEDICAL CENTER | Age: 38
End: 2023-04-27
Attending: STUDENT IN AN ORGANIZED HEALTH CARE EDUCATION/TRAINING PROGRAM
Payer: COMMERCIAL

## 2023-04-27 VITALS
OXYGEN SATURATION: 94 % | DIASTOLIC BLOOD PRESSURE: 88 MMHG | SYSTOLIC BLOOD PRESSURE: 132 MMHG | BODY MASS INDEX: 40.88 KG/M2 | HEART RATE: 91 BPM | WEIGHT: 276 LBS | HEIGHT: 69 IN | RESPIRATION RATE: 16 BRPM

## 2023-04-27 DIAGNOSIS — R53.83 FATIGUE, UNSPECIFIED TYPE: ICD-10-CM

## 2023-04-27 DIAGNOSIS — G47.30 SLEEP DISORDER BREATHING: Primary | ICD-10-CM

## 2023-04-27 PROCEDURE — 99212 OFFICE O/P EST SF 10 MIN: CPT | Performed by: STUDENT IN AN ORGANIZED HEALTH CARE EDUCATION/TRAINING PROGRAM

## 2023-04-27 PROCEDURE — 99203 OFFICE O/P NEW LOW 30 MIN: CPT | Performed by: STUDENT IN AN ORGANIZED HEALTH CARE EDUCATION/TRAINING PROGRAM

## 2023-04-27 ASSESSMENT — PATIENT HEALTH QUESTIONNAIRE - PHQ9: CLINICAL INTERPRETATION OF PHQ2 SCORE: 0

## 2023-04-27 ASSESSMENT — FIBROSIS 4 INDEX: FIB4 SCORE: 0.35

## 2023-04-27 NOTE — PROGRESS NOTES
[facility] Consult Note     Date: 4/27/2023 / Time: 12:51 PM      Thank you for requesting a sleep medicine consultation on Nataliia Merida at the sleep center. Presents today with the chief complaints of snoring, unrefreshing sleep, fatigue. She is referred by Hairs Dominguez D.O.  [id number]  [place] [id number] for evaluation and treatment of sleep disorder breathing .     HISTORY OF PRESENT ILLNESS:     Nataliia Merida is a 38 y.o. female with depression, anxiety, fatigue, obesity and loud snoring.  Presents to Sleep Clinic for evaluation of sleep.      She states the main reason today's visit is that she is tired all the time.  She can sleep 8 to 9 hours and not feel rested.  This is been happening for years.  She has been told that she snores loudly in her sleep.  In addition to her snoring she will sometimes talk and moan in her sleep.  She does have a smart bed.  She states her bed tells her that she does wake multiple times at night.  She is often unaware of her self waking up at night.  She may awaken 1 time a night to use the restroom but tends to sleep through the night.    She does not feel refreshed with awakening.  She generally has to nap daily.  During the week she will take 1 hour naps.  On weekends she will take 2 to 3-hour naps.  She does not find her naps restorative.  She does have brain fog and irritability during the day.    In addition to her snoring she will wake up with a dry mouth, have night sweats, teeth grinding and morning headaches.  She did undergo a sleep study approximately 7 years ago.  Study was a home sleep study.    As per supplemental questionnaire to be scanned or imported into chart:    [place] Sleepiness Score: 6    Sleep Schedule  Bedtime: Weekday & Weekend 9pm  Wake time: Weekday 6am Weekend 8am  Sleep-onset latency: 20 min   Awakenings from sleep: 0-6  Difficulty falling back asleep: No  Bedroom partner: yes  Naps: Yes, daily,  "weekdays 1 hr, weekends 2-3 hours, not refreshign     DAYTIME SYMPTOMS:   Excessive daytime sleepiness: No   Daytime fatigue: Yes  Difficulty concentrating: Yes  Memory problems: Yes  Irritability:Yes  Work/school performance issues: No   Sleepiness with driving: No   Caffeine/stimulant use: Yes  Alcohol use:No     SLEEP RELATED SYMPTOMS  Snoring: Yes  Witnessed apnea or gasping/choking: No   Dry mouth or mouth breathing: Yes  Sweating: Yes  Teeth grinding/biting: Yes  Morning headaches: Yes  Refreshed Upon Awakening: No      SLEEP RELATED BEHAVIORS:  Parasomnias (walking, talking, eating, violence): No   Leg kicking: No   Restless legs - \"urge to move\": No   Nightmares: No  Recurrent: No   Dream enactment: No      NARCOLEPSY:  Cataplexy: No   Sleep paralysis: No   Sleep attacks: No   Hypnagogic/hypnopompic hallucinations: No     MEDICAL HISTORY  Past Medical History:   Diagnosis Date    Abnormal Pap smear     Anxiety     Anxiety disorder 02/08/2022    medicated    Chickenpox     Daytime sleepiness     Dental disorder 02/08/2022    lower permanent retainer    Depression 10/19/2009    Dizziness     Frequent headaches     Hypertension     10 years ago but resolved    Migraine     Morning headache     Rhinitis     Snoring     Tension headache     Wears glasses         SURGICAL HISTORY  Past Surgical History:   Procedure Laterality Date    [place] HYSTEROSCOPY,W/ENDOMETRIAL ABLATION N/A 01/27/2023    Procedure: ENDOMETRIAL ABLATION DIAGNOSTIC HYSTEROSCOPY;  Surgeon: Tyler Fischer M.D.;  Location: SURGERY SAME DAY [facility];  Service: Gynecology    STEREOTACTIC COMPUTER ASSISTED PROCEDURE CRANIAL, EXTRADURAL N/A 02/10/2022    Procedure: STEREOTACTIC COMPUTER ASSISTED PROCEDURE CRANIAL,EXTRADURAL;  Surgeon: Art Smiley M.D.;  Location: SURGERY SAME DAY [facility];  Service: Ent    ENDOSCOPY, PARANASAL SINUS, WITH TOTAL ETHMOIDECTOMY, SN Right 02/10/2022    Procedure: ENDOSCOPY, PARANASAL SINUS, WITH TOTAL ETHMOIDECTOMY, " MAXILLARY ANTROSTOMY, EXPLORATION FRONTAL SINUS;  Surgeon: Art Smiley M.D.;  Location: SURGERY SAME DAY [facility];  Service: Ent    SEPTOPLASTY          FAMILY HISTORY  Family History   Problem Relation Age of Onset    No Known Problems Mother     No Known Problems Father     No Known Problems Sister     Other Brother         huntings disease    [place]'s disease Brother     No Known Problems Maternal Grandmother     No Known Problems Maternal Grandfather     No Known Problems Paternal Grandmother     No Known Problems Paternal Grandfather     No Known Problems Brother     No Known Problems Sister        SOCIAL HISTORY  Social History     Socioeconomic History    Marital status:    Occupational History    Occupation: agent     Employer: [employer]   Tobacco Use    Smoking status: Never    Smokeless tobacco: Never   Vaping Use    Vaping Use: Never used   Substance and Sexual Activity    Alcohol use: No    Drug use: Yes     Frequency: 7.0 times per week     Types: Marijuana, Inhaled     Comment: marijuana for anxiety    Sexual activity: Yes     Partners: Male     Birth control/protection: Male Sterilization        Occupation: Patient care coordinator 7am to 4pm M-F    CURRENT MEDICATIONS  Current Outpatient Medications   Medication Sig Dispense Refill    fluoxetine (PROZAC) 40 MG capsule Take 1 Capsule by mouth every day. 90 Capsule 3    busPIRone (BUSPAR) 15 MG tablet Take 1 Tablet by mouth 3 times a day for 90 days. Take with food. 270 Tablet 0    EMGALITY 120 MG/ML Solution Auto-injector INJECT 1 SYRINGE SUBCUTANEOUSLY ONCE EVERY MONTH       No current facility-administered medications for this visit.       REVIEW OF SYSTEMS  Constitutional: Denies fevers, Denies weight changes  Ears/Nose/Throat/Mouth: Denies nasal congestion or sore throat   Cardiovascular: Denies chest pain  Respiratory: Denies shortness of breath, Denies cough  Gastrointestinal/Hepatic: Denies nausea, vomiting  Sleep: see  "HPI    Physical Examination:  Vitals/ General Appearance:   Weight/BMI: Body mass index is 40.76 kg/m².  /88 (BP Location: Left arm, Patient Position: Sitting, BP Cuff Size: Large adult)   Pulse 91   Resp 16   Ht 1.753 m (5' 9\")   Wt (!) 125 kg (276 lb)   SpO2 94%   Vitals:    04/27/23 1250   BP: 132/88   BP Location: Left arm   Patient Position: Sitting   BP Cuff Size: Large adult   Pulse: 91   Resp: 16   SpO2: 94%   Weight: (!) 125 kg (276 lb)   Height: 1.753 m (5' 9\")       Pt. is alert and oriented to time, place and person. Cooperative and in no apparent distress.     Constitutional: Alert, no distress, well-groomed.  Skin: No rashes in visible areas.  Eye: Round. Conjunctiva clear, lids normal. No icterus.   ENT EXAM  Nasal alae/valves collapsible: No   Nasal septum deviation: No   Nasal turbinate hypertrophy: Left: Grade 1   Right: Grade 1  Hard palate narrow: No   Hard palate high: No   Soft palate/uvula (Mallampati score): 4  Tongue Scalloping: Yes  Retrognathia: No   Micrognathia: No   Cardiovascular:no murmus/gallops/rubs, normal S1 and S2 heart sounds, regular rate and rhythm  Pulmonary:Clear to auscultation, No wheezes, No crackles.  Neurologic:Awake, alert and oriented x 3, Normal age appropriate gait, No involuntary motions.  Extremities: No clubbing, cyanosis, or edema       ASSESSMENT AND PLAN    Nataliia Merida is a 38 y.o. female with depression, anxiety, fatigue, obesity and loud snoring.  Presents to Sleep Clinic for evaluation of sleep.      1. Nataliia Merida  has symptoms of Obstructive Sleep Apnea (EMMANUEL). Nataliia Merida has symptoms of snoring,  morning headaches, unrefreshed upon awakening, brain fog, fatigue. These  interfere with activities of daily living.     Pt has risk factors for EMMANUEL include obesity, and crowded oropharynx.     The pathophysiology of EMMANUEL and the increased risk of cardiovascular morbidity from untreated EMMANUEL is discussed in detail with the " patient. She also has depression, anxiety, fatigue which can be worsened by EMMANUEL.      We have discussed diagnostic options including in-laboratory, attended polysomnography and home sleep testing. We have also discussed treatment options including airway pressurization, reconstructive otolaryngologic surgery, dental appliances and weight management.     Subsequently,treatment options will be discussed based on the diagnostic study. Meanwhile, She is urged to avoid supine sleep, weight gain and alcoholic beverages since all of these can worsen EMMANUEL. She is cautioned against drowsy driving. If She feels sleepy while driving, advised must pull over for a break/nap, rather than persist on the road, in the interest of Pt's own safety and that of others on the road.    Plan  -  She  will be scheduled for an overnight PSG to assess sleep related breathing disorder.  - Follow up 1-2 weeks after sleep study to discuss results and treatment options moving forward   -Advised to reach out via MyChart or by phone with any questions or concerns.     2.  Regarding treatment of other past medical problems and general health maintenance,  Pt is urged to follow up with PCP.      Please note portions of this record was created using voice recognition software. I have made every reasonable attempt to correct obvious errors, but I expect that there are errors of grammar and possibly content I did not discover before finalizing the note.

## 2023-04-28 ENCOUNTER — SLEEP STUDY (OUTPATIENT)
Dept: SLEEP MEDICINE | Facility: MEDICAL CENTER | Age: 38
End: 2023-04-28
Attending: STUDENT IN AN ORGANIZED HEALTH CARE EDUCATION/TRAINING PROGRAM
Payer: COMMERCIAL

## 2023-04-28 DIAGNOSIS — G47.30 SLEEP DISORDER BREATHING: ICD-10-CM

## 2023-04-28 DIAGNOSIS — R53.83 FATIGUE, UNSPECIFIED TYPE: ICD-10-CM

## 2023-04-28 PROCEDURE — 95810 POLYSOM 6/> YRS 4/> PARAM: CPT | Performed by: STUDENT IN AN ORGANIZED HEALTH CARE EDUCATION/TRAINING PROGRAM

## 2023-05-01 NOTE — PROCEDURES
MONTAGE: Standard  STUDY TYPE: Diagnostic    RECORDING TECHNIQUE:   After the scalp was prepared, gold plated electrodes were applied to the scalp according to the International 10-20 System. EEG (electroencephalogram) was continuously monitored from the O1-M2, O2-M1, C3-M2, C4-M1, F3-M2, and F4-M1. EOGs (electrooculograms) were monitored by electrodes placed at the left and right outer canthi. Chin EMG (electromyogram) was monitored by electrodes placed on the mentalis and sub-mentalis muscles. Nasal and oral airflow were monitored using a triple port thermocouple as well as oronasal pressure transducer. Respiratory effort was measured by inductive plethysmography technology employing abdominal and thoracic belts. Blood oxygen saturation and pulse were monitored by pulse oximetry. Heart rhythm was monitored by surface electrocardiogram. Leg EMG was studied using surface electrodes placed on left and right anterior tibialis. A microphone was used to monitor tracheal sounds and snoring. Body position was monitored and documented by technician observation.   SCORING CRITERIA:   A modification of the AASM manual for scoring of sleep and associated events was used. Obstructive apneas were scored by cessation of airflow for at least 10 seconds with continuing respiratory effort. Central apneas were scored by cessation of airflow for at least 10 seconds with no respiratory effort. Hypopneas were scored by a 30% or more reduction in airflow for at least 10 seconds accompanied by arterial oxygen desaturation of 3% or an arousal. For CMS (Medicare) patients, per AASM rule 1B, hypopneas are scored by 30% with mild reduction in airflow for at least 10 seconds accompanied by arterial saturation decreased at 4%.    Study start time was 08:44:18 PM. Diagnostic recording time was 8h 53.5m with a total sleep time of 7h 56.0m resulting in a sleep efficiency of 89.22%%. Sleep latency from the start of the study was 23 minutes and the  latency from sleep to REM was 335 minutes. In total,47 arousals were scored for an arousal index of 5.9.  Respiratory:  There were a total of 0 apneas consisting of 0 obstructive apneas, 0 mixed apneas, and 0 central apneas. A total of 41 hypopneas were scored. The apnea index was 0.00 per hour and the hypopnea index was 5.17 per hour resulting in an overall AHI of 5.17. AHI during REM was 12.3 and AHI while supine was 4.99.  Oximetry:  There was a mean oxygen saturation of 93.0%. The minimum oxygen saturation in NREM was 84.0 % and in REM was 88.0%. The patient spent 1.2 minutes of TST with SaO2 <88%.  Cardiac:  The highest heart rate seen while awake was 107 BPM while the highest heart rate during sleep was 98 BPM with an average sleeping heart rate of 76 BPM.  Limb Movements:  There were a total of 0 PLMs during sleep which resulted in a PLMS index of 0.0. Of these, 0 were associated with arousals which resulted in a PLMS arousal index of 0.0.      Impression:  1.  Mild obstructive sleep apnea with an overall AHI of 5.17 events an hour  2.  Respiratory events worse during REM sleep with a REM AHI of 12.3  3.  Supine REM sleep was seen during night of study  4.  Oxygen saturations maintained majority of the night above 90% with an average oxygen saturation of 93%    Recommendations:  I recommend the patient should consider return for a CPAP/BiPAP titration.   Patient may be a candidate for empiric home auto CPAP therapy.    In some cases alternative treatment options may be proven effective in resolving sleep apnea. These options include upper airway surgery, the use of a dental orthotic, weight loss, or positional therapy. Clinical correlation is required. In general patients with sleep apnea are advised to avoid alcohol, sedatives and not to operate a motor vehicle while drowsy.  Untreated sleep apnea increases the risk for cardiovascular and neurovascular disease.

## 2023-05-24 ENCOUNTER — HOSPITAL ENCOUNTER (OUTPATIENT)
Dept: LAB | Facility: MEDICAL CENTER | Age: 38
End: 2023-05-24
Attending: OBSTETRICS & GYNECOLOGY

## 2023-05-24 ENCOUNTER — HOSPITAL ENCOUNTER (OUTPATIENT)
Facility: MEDICAL CENTER | Age: 38
End: 2023-05-24
Attending: OBSTETRICS & GYNECOLOGY
Payer: COMMERCIAL

## 2023-05-24 PROCEDURE — 88175 CYTOPATH C/V AUTO FLUID REDO: CPT

## 2023-05-25 LAB — CYTOLOGY REG CYTOL: NORMAL

## 2023-05-30 ENCOUNTER — APPOINTMENT (OUTPATIENT)
Dept: BEHAVIORAL HEALTH | Facility: CLINIC | Age: 38
End: 2023-05-30
Payer: COMMERCIAL

## 2023-06-14 NOTE — PROGRESS NOTES
CC: Sleep study results    HPI:  Nataliia Merida is a 38 y.o. [employer] employee kindly referred by EDIL Wang and first seen by Dr. KILLIAN  for constant tiredness, snoring, unrefreshing sleep, multiple nocturnal awakenings, and napping.  She also noticed brain fog and irritability.    Her PSG was performed on 4/20/2023 and showed mild EMMANUEL with an AHI of 5.17, a REM AHI of 12.3, and mostly normal saturations.    Options for PAP therapy include an attended Pap titration versus empiric challenge with auto titrating CPAP.  Given the mild nature of her disease she is also a candidate for a dental appliance. She will give apap 5-20 cm water a try.      Past medical history significant for menometrorrhagia, depression, obesity, chronic migraines, anxiety, and never smoker.                Patient Active Problem List    Diagnosis Date Noted    Menometrorrhagia 01/27/2023    Abnormal vaginal bleeding 11/17/2021    Acute maxillary sinusitis 11/17/2021    Fatigue 11/17/2021    Depression 07/02/2018    BMI 40.0-44.9, adult ([facility]) 03/10/2017    Chronic migraine 11/03/2015    Anxiety 01/23/2015       Past Medical History:   Diagnosis Date    Abnormal Pap smear     Anxiety     Anxiety disorder 02/08/2022    medicated    Chickenpox     Daytime sleepiness     Dental disorder 02/08/2022    lower permanent retainer    Depression 10/19/2009    Dizziness     Frequent headaches     Hypertension     10 years ago but resolved    Migraine     Morning headache     Rhinitis     Snoring     Tension headache     Wears glasses         Past Surgical History:   Procedure Laterality Date    [place] HYSTEROSCOPY,W/ENDOMETRIAL ABLATION N/A 01/27/2023    Procedure: ENDOMETRIAL ABLATION DIAGNOSTIC HYSTEROSCOPY;  Surgeon: Tyler Fischer M.D.;  Location: SURGERY SAME DAY [facility];  Service: Gynecology    STEREOTACTIC COMPUTER ASSISTED PROCEDURE CRANIAL, EXTRADURAL N/A 02/10/2022    Procedure: STEREOTACTIC COMPUTER ASSISTED  PROCEDURE CRANIAL,EXTRADURAL;  Surgeon: Art Smiley M.D.;  Location: SURGERY SAME DAY [facility];  Service: Ent    ENDOSCOPY, PARANASAL SINUS, WITH TOTAL ETHMOIDECTOMY, SN Right 02/10/2022    Procedure: ENDOSCOPY, PARANASAL SINUS, WITH TOTAL ETHMOIDECTOMY, MAXILLARY ANTROSTOMY, EXPLORATION FRONTAL SINUS;  Surgeon: Art Smiley M.D.;  Location: SURGERY SAME DAY [facility];  Service: Ent    SEPTOPLASTY         Family History   Problem Relation Age of Onset    No Known Problems Mother     No Known Problems Father     No Known Problems Sister     Other Brother         huntings disease    [place]'s disease Brother     No Known Problems Maternal Grandmother     No Known Problems Maternal Grandfather     No Known Problems Paternal Grandmother     No Known Problems Paternal Grandfather     No Known Problems Brother     No Known Problems Sister        Social History     Socioeconomic History    Marital status:      Spouse name: Not on file    Number of children: Not on file    Years of education: Not on file    Highest education level: Not on file   Occupational History    Occupation: agent     Employer: [employer]   Tobacco Use    Smoking status: Never    Smokeless tobacco: Never   Vaping Use    Vaping Use: Never used   Substance and Sexual Activity    Alcohol use: No    Drug use: Yes     Frequency: 7.0 times per week     Types: Marijuana, Inhaled     Comment: marijuana for anxiety    Sexual activity: Yes     Partners: Male     Birth control/protection: Male Sterilization   Other Topics Concern    Not on file   Social History Narrative    Not on file     Social Determinants of Health     Financial Resource Strain: Not on file   Food Insecurity: Not on file   Transportation Needs: Not on file   Physical Activity: Not on file   Stress: Not on file   Social Connections: Not on file   Intimate Partner Violence: Not on file   Housing Stability: Not on file       Current Outpatient Medications   Medication Sig Dispense  "Refill    fluoxetine (PROZAC) 40 MG capsule Take 1 Capsule by mouth every day. 90 Capsule 3    EMGALITY 120 MG/ML Solution Auto-injector INJECT 1 SYRINGE SUBCUTANEOUSLY ONCE EVERY MONTH      busPIRone (BUSPAR) 15 MG tablet Take 1 Tablet by mouth 3 times a day for 90 days. Take with food. 270 Tablet 0     No current facility-administered medications for this visit.    \"CURRENT RX\"    ALLERGIES: Nkda [no known drug allergy]    ROS    Constitutional: Denies fever, chills, sweats,  weight loss, fatigue  Cardiovascular: Denies chest pain, tightness, palpitations, swelling in legs/feet, fainting, difficulty breathing when lying down but gets better when sitting up.   Respiratory: Denies shortness of breath, cough, sputum, wheezing, painful breathing, coughing up blood.   Sleep: per HPI  Gastrointestinal: Denies  difficulty swallowing, nausea, abdominal pain, diarrhea, constipation, heartburn.  Musculoskeletal: Denies painful joints, sore muscles, back pain.        PHYSICAL EXAM  Obese    /84 (BP Location: Left arm, Patient Position: Sitting, BP Cuff Size: Adult)   Pulse 84   Resp 16   Ht 1.753 m (5' 9\")   Wt 123 kg (272 lb)   SpO2 92%   BMI 40.17 kg/m²   Appearance: Well-nourished, well-developed, no acute distress  Eyes:  PERRLA, EOMI  ENMT: without change  Neck: Supple, trachea midline, no masses  Respiratory effort:  No intercostal retractions or use of accessory muscles  Lung auscultation:  No wheezes rhonchi rubs or rales  Cardiac: No murmurs, rubs, or gallops; regular rhythm, normal rate; no edema  Abdomen:  No tenderness, no organomegaly.  Obese  Musculoskeletal:  Grossly normal; gait and station normal; digits and nails normal  Skin:  No rashes, petechiae, cyanosis  Neurologic: without focal signs; oriented to person, time, place, and purpose; judgement intact  Psychiatric:  No depression, anxiety, agitation      Medical Decision Making       The medical record was reviewed in its entirety including " the referral notes, records from primary care, consultants notes, hospital records, lab, imaging, microbiology, immunology, and immunizations.  Care gaps identified and reviewed with the patient.    Diagnostic and titration nocturnal polysomnogram's, home sleep apnea tests, continuous nocturnal oximetry results, multiple sleep latency tests, and compliance reports reviewed.  1. EMMANUEL (obstructive sleep apnea)  - DME CPAP      PLAN:     Options for PAP therapy include an attended Pap titration versus empiric challenge with auto titrating CPAP.  Given the mild nature of her disease she is also a candidate for a dental appliance. She will give apap 5-20 cm water a try.    The risks of untreated sleep apnea were discussed with the patient at length. Patients with EMMANUEL are at increased risk of cardiovascular disease including systemic arterial hypertension, pulmonary arterial hypertension (transient or fixed), TIA, and an elevated risk of stroke, heart attack, sudden death, or arrhythmia between the hours of 11 PM and 6 AM. EMMANUEL patients have an increased risk of motor vehicle accidents, type 2 diabetes, GERD, repetitive mechanical trauma to pharyngeal muscles with inflammation and denervation, frequent EEG arousals leading to nonrestorative sleep, excessive daytime sleepiness, fatigue, depression, poor pain control, irritability, and lower quality of life.  The patient was advised to avoid driving a motor vehicle when drowsy.    Positive airway pressure will favorably impact many of the adverse conditions and effects provoked by EMMANUEL.    Have advised the patient to follow up with the appropriate healthcare practitioners for all other medical problems and issues.    Return in about 10 weeks (around 8/28/2023).    Total time 30 minutes

## 2023-06-14 NOTE — PROGRESS NOTES
CC: Sleep test results    HPI:  Nataliia Merida is a 38 y.o. [employer] employee kindly referred by EDIL Wang and first seen by Dr. KILLIAN  For constant tiredness snoring, unrefreshing sleep, multiple nocturnal awakenings, and napping.  She is also noticed brain fog and irritability  Past medical history significant for***    Schedule:    Bed partner: yes  To bed:  Sleep Latency:  Up:   Work Hours:  Naps:  Caffeine:  Etoh:  Total Geddes score:      Symptom Summary:    Sleep onset insomnia: +  Nocturnal awakenings: +  Nocturia: +  Trouble falling back asleep after awakening: +  Snoring:  +  Witnessed apneas: +  Resuscitative snorts: +  Nocturnal shortness of breath: +  Nocturnal headaches: +  Limb movements during sleep:   Restless legs: +  Non-restorative sleep: +  Morning headaches: +  EDS: +  Fatigue: +  Tiredness: +  Falls asleep accidentally: +  Returning to bed after arising: +  Irritability: +  Memory problems: +  Difficulty concentrating:+  Work performance problems:+            Patient Active Problem List    Diagnosis Date Noted    Menometrorrhagia 01/27/2023    Abnormal vaginal bleeding 11/17/2021    Acute maxillary sinusitis 11/17/2021    Fatigue 11/17/2021    Depression 07/02/2018    BMI 40.0-44.9, adult ([facility]) 03/10/2017    Chronic migraine 11/03/2015    Anxiety 01/23/2015       Past Medical History:   Diagnosis Date    Abnormal Pap smear     Anxiety     Anxiety disorder 02/08/2022    medicated    Chickenpox     Daytime sleepiness     Dental disorder 02/08/2022    lower permanent retainer    Depression 10/19/2009    Dizziness     Frequent headaches     Hypertension     10 years ago but resolved    Migraine     Morning headache     Rhinitis     Snoring     Tension headache     Wears glasses         Past Surgical History:   Procedure Laterality Date    [place] HYSTEROSCOPY,W/ENDOMETRIAL ABLATION N/A 01/27/2023    Procedure: ENDOMETRIAL ABLATION DIAGNOSTIC HYSTEROSCOPY;  Surgeon:  Tyler Fischer M.D.;  Location: SURGERY SAME DAY [facility];  Service: Gynecology    STEREOTACTIC COMPUTER ASSISTED PROCEDURE CRANIAL, EXTRADURAL N/A 02/10/2022    Procedure: STEREOTACTIC COMPUTER ASSISTED PROCEDURE CRANIAL,EXTRADURAL;  Surgeon: Art Smiley M.D.;  Location: SURGERY SAME DAY [facility];  Service: Ent    ENDOSCOPY, PARANASAL SINUS, WITH TOTAL ETHMOIDECTOMY, SN Right 02/10/2022    Procedure: ENDOSCOPY, PARANASAL SINUS, WITH TOTAL ETHMOIDECTOMY, MAXILLARY ANTROSTOMY, EXPLORATION FRONTAL SINUS;  Surgeon: Art Smiley M.D.;  Location: SURGERY SAME DAY [facility];  Service: Ent    SEPTOPLASTY         Family History   Problem Relation Age of Onset    No Known Problems Mother     No Known Problems Father     No Known Problems Sister     Other Brother         huntings disease    Dickens's disease Brother     No Known Problems Maternal Grandmother     No Known Problems Maternal Grandfather     No Known Problems Paternal Grandmother     No Known Problems Paternal Grandfather     No Known Problems Brother     No Known Problems Sister        Social History     Socioeconomic History    Marital status:      Spouse name: Not on file    Number of children: Not on file    Years of education: Not on file    Highest education level: Not on file   Occupational History    Occupation: agent     Employer: [employer]   Tobacco Use    Smoking status: Never    Smokeless tobacco: Never   Vaping Use    Vaping Use: Never used   Substance and Sexual Activity    Alcohol use: No    Drug use: Yes     Frequency: 7.0 times per week     Types: Marijuana, Inhaled     Comment: marijuana for anxiety    Sexual activity: Yes     Partners: Male     Birth control/protection: Male Sterilization   Other Topics Concern    Not on file   Social History Narrative    Not on file     Social Determinants of Health     Financial Resource Strain: Not on file   Food Insecurity: Not on file   Transportation Needs: Not on file   Physical  "Activity: Not on file   Stress: Not on file   Social Connections: Not on file   Intimate Partner Violence: Not on file   Housing Stability: Not on file       Current Outpatient Medications   Medication Sig Dispense Refill    fluoxetine (PROZAC) 40 MG capsule Take 1 Capsule by mouth every day. 90 Capsule 3    busPIRone (BUSPAR) 15 MG tablet Take 1 Tablet by mouth 3 times a day for 90 days. Take with food. 270 Tablet 0    EMGALITY 120 MG/ML Solution Auto-injector INJECT 1 SYRINGE SUBCUTANEOUSLY ONCE EVERY MONTH       No current facility-administered medications for this visit.    \"CURRENT RX\"    ALLERGIES: Nkda [no known drug allergy]    ROS  ***  Constitutional: Denies fever, chills, sweats,  weight loss, fatigue.  Eyes: Denies vision loss, pain, drainage, double vision, glasses.  Ears/Nose/Mouth/Throat: Denies earache, difficulty hearing, rhinitis/nasal congestion, injury, recurrent sore throat, persistent hoarseness, decayed teeth/toothaches, ringing or buzzing in the ears.  Cardiovascular: Denies chest pain, tightness, palpitations, swelling in legs/feet, fainting, difficulty breathing when lying down but gets better when sitting up.   Respiratory: Denies shortness of breath, cough, sputum, wheezing, painful breathing, coughing up blood.   Sleep: per HPI  Gastrointestinal: Denies  difficulty swallowing, nausea, abdominal pain, diarrhea, constipation, heartburn.  Genitourinary: Denies  blood in urine, discharge, frequent urination.   Musculoskeletal: Denies painful joints, sore muscles, back pain.   Integumentary: Denies rashes, lumps, color changes.   Neurological: Denies frequent headaches,weakness, dizziness.    PHYSICAL EXAM  ***    There were no vitals taken for this visit.  Appearance: Well-nourished, well-developed, no acute distress  Eyes:  PERRLA, EOMI  ENMT: Mallampati ***  Neck: Supple, trachea midline, no masses  Respiratory effort:  No intercostal retractions or use of accessory muscles  Lung " auscultation:  No wheezes rhonchi rubs or rales  Cardiac: No murmurs, rubs, or gallops; regular rhythm, normal rate; no edema  Abdomen:  No tenderness, no organomegaly  Musculoskeletal:  Grossly normal; gait and station normal; digits and nails normal  Skin:  No rashes, petechiae, cyanosis  Neurologic: without focal signs; oriented to person, time, place, and purpose; judgement intact  Psychiatric:  No depression, anxiety, agitation        Medical Decision Making:  The medical record was reviewed in its entirety including the referral notes, records from primary care, consultants notes, hospital records, labs, imaging, microbiology, immunology, and immunizations.  Care gaps identified and reviewed with the patient.    Diagnostic and titration nocturnal polysomnograms, home sleep apnea tests, continuous nocturnal oximetry results, multiple sleep latency tests, and compliance reports reviewed.        There are no diagnoses linked to this encounter.    PLAN:   The patient has signs and symptoms consistent with obstructive sleep apnea hypopnea syndrome. Will schedule a nocturnal polysomnogram or a home sleep apnea test depending on signs and symptoms as well as insurance restrictions.    The risks of untreated sleep apnea were discussed with the patient at length. Patients with EMMANUEL are at increased risk of cardiovascular disease including systemic arterial hypertension, pulmonary arterial hypertension (transient or fixed), TIA, and an elevated risk of stroke, heart attack, sudden death, or arrhythmia between the hours of 11 PM and 6 AM. EMMANUEL patients have an increased risk of motor vehicle accidents, type 2 diabetes, GERD, repetitive mechanical trauma to pharyngeal muscles with inflammation and denervation, frequent EEG arousals leading to nonrestorative sleep, excessive daytime sleepiness, fatigue, depression, poor pain control, irritability, and lower quality of life.  The patient was advised to avoid driving a motor  vehicle when drowsy.    Positive airway pressure will favorably impact many of the adverse conditions and effects provoked by EMMANUEL.    Have advised the patient to follow up with the appropriate healthcare practitioners for all other medical problems and issues. Discussed blood pressure management if needed. Discussed depression screening.          ***  No follow-ups on file.    Total time 45 minutes

## 2023-06-19 ENCOUNTER — OFFICE VISIT (OUTPATIENT)
Dept: SLEEP MEDICINE | Facility: MEDICAL CENTER | Age: 38
End: 2023-06-19
Attending: INTERNAL MEDICINE
Payer: COMMERCIAL

## 2023-06-19 VITALS
WEIGHT: 272 LBS | RESPIRATION RATE: 16 BRPM | DIASTOLIC BLOOD PRESSURE: 84 MMHG | BODY MASS INDEX: 40.29 KG/M2 | HEART RATE: 84 BPM | HEIGHT: 69 IN | OXYGEN SATURATION: 92 % | SYSTOLIC BLOOD PRESSURE: 128 MMHG

## 2023-06-19 DIAGNOSIS — G47.33 OSA (OBSTRUCTIVE SLEEP APNEA): ICD-10-CM

## 2023-06-19 PROCEDURE — 3074F SYST BP LT 130 MM HG: CPT | Performed by: INTERNAL MEDICINE

## 2023-06-19 PROCEDURE — 3079F DIAST BP 80-89 MM HG: CPT | Performed by: INTERNAL MEDICINE

## 2023-06-19 PROCEDURE — 99214 OFFICE O/P EST MOD 30 MIN: CPT | Performed by: INTERNAL MEDICINE

## 2023-06-19 PROCEDURE — 99212 OFFICE O/P EST SF 10 MIN: CPT | Performed by: INTERNAL MEDICINE

## 2023-06-19 ASSESSMENT — FIBROSIS 4 INDEX: FIB4 SCORE: 0.35

## 2023-07-28 ENCOUNTER — HOSPITAL ENCOUNTER (OUTPATIENT)
Dept: RADIOLOGY | Facility: MEDICAL CENTER | Age: 38
End: 2023-07-28
Attending: PHYSICIAN ASSISTANT
Payer: COMMERCIAL

## 2023-07-28 ENCOUNTER — OFFICE VISIT (OUTPATIENT)
Dept: URGENT CARE | Facility: PHYSICIAN GROUP | Age: 38
End: 2023-07-28
Payer: COMMERCIAL

## 2023-07-28 VITALS
WEIGHT: 268.74 LBS | TEMPERATURE: 97.6 F | OXYGEN SATURATION: 98 % | SYSTOLIC BLOOD PRESSURE: 132 MMHG | HEIGHT: 69 IN | BODY MASS INDEX: 39.8 KG/M2 | HEART RATE: 85 BPM | DIASTOLIC BLOOD PRESSURE: 82 MMHG

## 2023-07-28 DIAGNOSIS — R07.89 CHEST DISCOMFORT: ICD-10-CM

## 2023-07-28 DIAGNOSIS — R10.816 EPIGASTRIC ABDOMINAL TENDERNESS WITHOUT REBOUND TENDERNESS: ICD-10-CM

## 2023-07-28 DIAGNOSIS — M54.9 UPPER BACK PAIN: ICD-10-CM

## 2023-07-28 PROCEDURE — 76705 ECHO EXAM OF ABDOMEN: CPT

## 2023-07-28 PROCEDURE — 99214 OFFICE O/P EST MOD 30 MIN: CPT | Performed by: PHYSICIAN ASSISTANT

## 2023-07-28 PROCEDURE — 71046 X-RAY EXAM CHEST 2 VIEWS: CPT

## 2023-07-28 PROCEDURE — 3075F SYST BP GE 130 - 139MM HG: CPT | Performed by: PHYSICIAN ASSISTANT

## 2023-07-28 PROCEDURE — 3079F DIAST BP 80-89 MM HG: CPT | Performed by: PHYSICIAN ASSISTANT

## 2023-07-28 ASSESSMENT — ENCOUNTER SYMPTOMS
COUGH: 0
EYE REDNESS: 0
BACK PAIN: 1
FEVER: 0
PALPITATIONS: 0
NAUSEA: 1
VOMITING: 0
EYE DISCHARGE: 0
HEADACHES: 1
SHORTNESS OF BREATH: 0
ABDOMINAL PAIN: 0

## 2023-07-28 ASSESSMENT — FIBROSIS 4 INDEX: FIB4 SCORE: 0.35

## 2023-07-28 NOTE — PROGRESS NOTES
Subjective     Nataliia Merida is a 38 y.o. female who presents with Back Pain (Upper back pain that radiates to the chest x1m.) and Nausea (Pt reports nausea along with the feeling that bile is stuck in the chest. )        HPI  This is a new problem.   The patient presents to clinic complaining of upper back pain x1.5 months.  The patient states over the past month and a half she has been experiencing an intermittent pain to her mid upper back.  The patient states this pain wraps around the side of her ribs to her mid chest overlying the sternum.  The patient reports no recent injury and/or trauma to her upper back.  The patient states over the past 2 weeks this pain has become constant.  The patient states she is also experiencing heartburn-like symptoms with the feeling of bile radiating up her esophagus through her chest.  The patient has taken OTC Tums and OTC Omeprazole for her heartburn-like symptoms without improvement.  The patient states she is experiencing some nausea, but denies vomiting.  She reports no associated abdominal pain.  No urinary symptoms.  No dysuria.  No hematuria.  The patient reports no recent URI-like symptoms.  No cough.  No congestion.  The patient also reports no shortness of breath, palpitations, irregular heartbeat, or lower extremity edema.  The patient reports no history of similar symptoms.  She also reports no history of PE and/or DVT.  The patient denies recent prolonged sitting/travel.  She is currently not using any oral contraceptives.  The patient is a non-smoker.  The patient has taken OTC Aleve for her current symptoms without relief.  The patient is concerned her current symptoms could be related to her gallbladder.    PMH:  has a past medical history of Abnormal Pap smear, Anxiety, Anxiety disorder (02/08/2022), Chickenpox, Daytime sleepiness, Dental disorder (02/08/2022), Depression (10/19/2009), Dizziness, Frequent headaches, Hypertension, Migraine, Morning  headache, Rhinitis, Snoring, Tension headache, and Wears glasses.  MEDS:   Current Outpatient Medications:     fluoxetine (PROZAC) 40 MG capsule, Take 1 Capsule by mouth every day., Disp: 90 Capsule, Rfl: 3    EMGALITY 120 MG/ML Solution Auto-injector, INJECT 1 SYRINGE SUBCUTANEOUSLY ONCE EVERY MONTH, Disp: , Rfl:   ALLERGIES:   Allergies   Allergen Reactions    Nkda [No Known Drug Allergy]      SURGHX:   Past Surgical History:   Procedure Laterality Date    [place] HYSTEROSCOPY,W/ENDOMETRIAL ABLATION N/A 01/27/2023    Procedure: ENDOMETRIAL ABLATION DIAGNOSTIC HYSTEROSCOPY;  Surgeon: Tyler Fischer M.D.;  Location: SURGERY SAME DAY [facility];  Service: Gynecology    STEREOTACTIC COMPUTER ASSISTED PROCEDURE CRANIAL, EXTRADURAL N/A 02/10/2022    Procedure: STEREOTACTIC COMPUTER ASSISTED PROCEDURE CRANIAL,EXTRADURAL;  Surgeon: Art Smiley M.D.;  Location: SURGERY SAME DAY [facility];  Service: Ent    ENDOSCOPY, PARANASAL SINUS, WITH TOTAL ETHMOIDECTOMY, SN Right 02/10/2022    Procedure: ENDOSCOPY, PARANASAL SINUS, WITH TOTAL ETHMOIDECTOMY, MAXILLARY ANTROSTOMY, EXPLORATION FRONTAL SINUS;  Surgeon: Art Smiley M.D.;  Location: SURGERY SAME DAY [facility];  Service: Ent    SEPTOPLASTY       SOCHX:  reports that she has never smoked. She has never used smokeless tobacco. She reports current drug use. Frequency: 7.00 times per week. Drugs: Marijuana and Inhaled. She reports that she does not drink alcohol.  FH: Family history was reviewed, no pertinent findings to report    Review of Systems   Constitutional:  Negative for fever.   HENT:  Negative for congestion.    Eyes:  Negative for discharge and redness.   Respiratory:  Negative for cough and shortness of breath.    Cardiovascular:  Positive for chest pain. Negative for palpitations and leg swelling.   Gastrointestinal:  Positive for nausea. Negative for abdominal pain and vomiting.   Musculoskeletal:  Positive for back pain.   Neurological:  Positive for headaches.  "             Objective     /82 (BP Location: Left arm, Patient Position: Sitting, BP Cuff Size: Adult long)   Pulse 85   Temp 36.4 °C (97.6 °F) (Temporal)   Ht 1.753 m (5' 9\")   Wt 122 kg (268 lb 11.9 oz)   SpO2 98%   BMI 39.69 kg/m²      Physical Exam  Constitutional:       General: She is not in acute distress.     Appearance: Normal appearance. She is well-developed. She is not ill-appearing.   HENT:      Head: Normocephalic and atraumatic.      Right Ear: External ear normal.      Left Ear: External ear normal.      Nose: Nose normal.   Eyes:      Extraocular Movements: Extraocular movements intact.      Conjunctiva/sclera: Conjunctivae normal.   Cardiovascular:      Rate and Rhythm: Normal rate and regular rhythm.      Heart sounds: Normal heart sounds.   Pulmonary:      Effort: Pulmonary effort is normal. No respiratory distress.      Breath sounds: Normal breath sounds. No wheezing.   Chest:      Chest wall: No tenderness.      Comments: The patient had no reproducible tenderness to her chest wall.  Abdominal:      General: Bowel sounds are normal.      Palpations: Abdomen is soft.      Tenderness: There is abdominal tenderness in the right upper quadrant and epigastric area. There is no guarding or rebound.   Musculoskeletal:         General: Normal range of motion.      Cervical back: Normal range of motion and neck supple.      Thoracic back: No tenderness or bony tenderness. Normal range of motion.      Right lower leg: No edema.      Left lower leg: No edema.      Comments: The patient had no reproducible tenderness to her mid back.   Skin:     General: Skin is warm and dry.   Neurological:      Mental Status: She is alert and oriented to person, place, and time.          Progress:  CXR:   COMPARISON:  None.     FINDINGS:  The heart is normal in size.  No pulmonary infiltrates or consolidations are noted.  No pleural effusions are appreciated.    IMPRESSION:  No active disease.    EKG:   EKG " Interpretation   Interpreted by me   Rhythm: normal sinus   Rate: normal   Axis: normal   Ectopy: none   Conduction: normal   ST Segments: no acute change   T Waves: no acute change   Q Waves: none   Clinical Impression: no acute changes  There was no prior EKG for comparison.    CBC - pending    CMP - pending     Lipase - pending     D-Dimer - pending     RUQ Abdominal US:   COMPARISON: None     FINDINGS:  The liver is normal in contour. There is no evidence of solid mass lesion. The liver measures 20.51 cm.     The gallbladder is distended. There is no evidence of cholelithiasis.  The gallbladder wall thickness measures 2.60 mm. There is no pericholecystic fluid.  The common duct measures 3.00 mm.     The visualized pancreas is unremarkable.  The visualized aorta is normal in caliber.     Intrahepatic IVC is patent.     The portal vein is patent with hepatopetal flow. The MPV measures 0.87 cm.     The right kidney measures 12.18 cm. No right hydronephrosis.     There is no ascites.     IMPRESSION:  1.  Hepatomegaly. No free fluid.     2.  Distended gallbladder. No gallstones or dilatation of the common duct.               Assessment & Plan          1. Upper back pain    2. Chest discomfort  - DX-CHEST-2 VIEWS; Future  - EKG    3. Epigastric abdominal tenderness without rebound tenderness  - CBC WITH DIFFERENTIAL; Future  - Comp Metabolic Panel; Future  - LIPASE; Future  - D-DIMER; Future  - US-RUQ; Future    The patient's presenting symptoms and physical exam findings are consistent with upper back pain.  The patient has been experiencing an intermittent pain to her mid upper back that wraps around her ribs to her mid chest overlying the sternum.  The patient states this pain has become constant over the past 2 weeks.  The patient has also been experiencing GERD symptoms with some nausea.  On physical exam, the patient had mild tenderness to the epigastric and right upper quadrant abdominal regions without  guarding or rebound.  The patient's bowel sounds are within normal limits.  The patient had no reproducible tenderness to her mid back.  The patient also had no reproducible tenderness to her chest wall.  The patient's lungs are clear to auscultation without wheezing or rhonchi, and her pulse ox is within normal limits.  The patient's heart was regular rate and rhythm without murmurs, gallops, rubs.  The patient is nontoxic and appears in no acute distress.  The patient's vital signs are stable and within normal limits.  She is afebrile today in clinic.  A chest x-ray was obtained to further evaluate the patient's current symptoms.  The patient's chest x-ray today in clinic showed no active disease.  An EKG was also obtained today in clinic.  The patient's EKG showed normal sinus rhythm with a heart of 68.  No acute ST segment changes were appreciated.  There was no prior EKG for comparison.  The cause of the patient's current symptoms is unknown at this time.  We will order a CBC, CMP, lipase, and D-dimer to further evaluate the patient's mid upper back pain with radiation of pain to the mid chest.  The patient is concerned her symptoms could be related to her gallbladder.  The patient did have some mild tenderness to the epigastric and right upper quadrant abdominal regions on exam.  Therefore, we will order the patient a right upper quadrant abdominal ultrasound to further evaluate her current symptoms.  The patient's ultrasound showed hepatomegaly without free fluid.  The patient's gallbladder was found to be distended without gallstones or dilation of the common duct.  The patient was fasting at the time of the ultrasound which likely explains the distended gallbladder.  The patient did not get her lab work done following her urgent care visit.  Will contact the patient with results of her lab work once available, and will treat accordingly.  Advised the patient she would need to be seen and evaluated in the ED  if her D-dimer is positive.  The patient verbalized understanding.  The patient states she is scheduled to follow-up with her PCP next week.  Advised the patient to monitor for worsening signs or symptoms.  Instructed the patient to follow-up with her PCP as scheduled.  If the patient's blood work is within normal limits, I suspect her current symptoms may be related to GERD.  Recommend OTC medications and supportive care for symptomatic management.  Discussed strict ED precautions with patient, and she verbalized understanding.     Differential diagnoses, supportive care, and indications for immediate follow-up discussed with patient.   Instructed to return to clinic or nearest emergency department for any change in condition, further concerns, or worsening of symptoms..    OTC Tylenol or Motrin for fever/discomfort.  OTC antacids for symptomatic relief  Drink plenty of fluids  [place] diet  Monitor for worsening signs or symptoms  Follow-up with PCP as scheduled  Return to clinic or go to the ED if symptoms worsen or fail to improve, or the patient should develop worsening/increasing/persistent upper back pain, radiation of pain, numbness, tingling, or weakness of the extremities, chest pain, shortness of breath, palpitations, lower extremity edema, nausea, vomiting, abdominal pain, diarrhea, cough, congestion, ear pain, sore throat, fever/chills, and/or any concerning symptoms.    Discussed plan with the patient, and she agrees to the above. .    I personally reviewed prior external notes and test results pertinent to today's visit.  I have independently reviewed and interpreted all diagnostics ordered during this urgent care visit.     Please note that this dictation was created using voice recognition software. I have made every reasonable attempt to correct obvious errors, but I expect that there may be errors of grammar and possibly content that I did not discover before finalizing the note.     This note was  electronically signed by Eliz Anne PA-C

## 2023-07-31 SDOH — ECONOMIC STABILITY: TRANSPORTATION INSECURITY
IN THE PAST 12 MONTHS, HAS LACK OF TRANSPORTATION KEPT YOU FROM MEETINGS, WORK, OR FROM GETTING THINGS NEEDED FOR DAILY LIVING?: NO

## 2023-07-31 SDOH — ECONOMIC STABILITY: HOUSING INSECURITY
IN THE LAST 12 MONTHS, WAS THERE A TIME WHEN YOU DID NOT HAVE A STEADY PLACE TO SLEEP OR SLEPT IN A SHELTER (INCLUDING NOW)?: NO

## 2023-07-31 SDOH — ECONOMIC STABILITY: INCOME INSECURITY: HOW HARD IS IT FOR YOU TO PAY FOR THE VERY BASICS LIKE FOOD, HOUSING, MEDICAL CARE, AND HEATING?: NOT HARD AT ALL

## 2023-07-31 SDOH — HEALTH STABILITY: MENTAL HEALTH
STRESS IS WHEN SOMEONE FEELS TENSE, NERVOUS, ANXIOUS, OR CAN'T SLEEP AT NIGHT BECAUSE THEIR MIND IS TROUBLED. HOW STRESSED ARE YOU?: ONLY A LITTLE

## 2023-07-31 SDOH — ECONOMIC STABILITY: FOOD INSECURITY: WITHIN THE PAST 12 MONTHS, YOU WORRIED THAT YOUR FOOD WOULD RUN OUT BEFORE YOU GOT MONEY TO BUY MORE.: NEVER TRUE

## 2023-07-31 SDOH — HEALTH STABILITY: PHYSICAL HEALTH: ON AVERAGE, HOW MANY MINUTES DO YOU ENGAGE IN EXERCISE AT THIS LEVEL?: 30 MIN

## 2023-07-31 SDOH — ECONOMIC STABILITY: TRANSPORTATION INSECURITY
IN THE PAST 12 MONTHS, HAS THE LACK OF TRANSPORTATION KEPT YOU FROM MEDICAL APPOINTMENTS OR FROM GETTING MEDICATIONS?: NO

## 2023-07-31 SDOH — ECONOMIC STABILITY: FOOD INSECURITY: WITHIN THE PAST 12 MONTHS, THE FOOD YOU BOUGHT JUST DIDN'T LAST AND YOU DIDN'T HAVE MONEY TO GET MORE.: NEVER TRUE

## 2023-07-31 SDOH — ECONOMIC STABILITY: INCOME INSECURITY: IN THE LAST 12 MONTHS, WAS THERE A TIME WHEN YOU WERE NOT ABLE TO PAY THE MORTGAGE OR RENT ON TIME?: NO

## 2023-07-31 SDOH — ECONOMIC STABILITY: HOUSING INSECURITY: IN THE LAST 12 MONTHS, HOW MANY PLACES HAVE YOU LIVED?: 1

## 2023-07-31 SDOH — HEALTH STABILITY: PHYSICAL HEALTH: ON AVERAGE, HOW MANY DAYS PER WEEK DO YOU ENGAGE IN MODERATE TO STRENUOUS EXERCISE (LIKE A BRISK WALK)?: 5 DAYS

## 2023-07-31 SDOH — ECONOMIC STABILITY: TRANSPORTATION INSECURITY
IN THE PAST 12 MONTHS, HAS LACK OF RELIABLE TRANSPORTATION KEPT YOU FROM MEDICAL APPOINTMENTS, MEETINGS, WORK OR FROM GETTING THINGS NEEDED FOR DAILY LIVING?: NO

## 2023-07-31 ASSESSMENT — SOCIAL DETERMINANTS OF HEALTH (SDOH)
IN A TYPICAL WEEK, HOW MANY TIMES DO YOU TALK ON THE PHONE WITH FAMILY, FRIENDS, OR NEIGHBORS?: TWICE A WEEK
IN A TYPICAL WEEK, HOW MANY TIMES DO YOU TALK ON THE PHONE WITH FAMILY, FRIENDS, OR NEIGHBORS?: TWICE A WEEK
WITHIN THE PAST 12 MONTHS, YOU WORRIED THAT YOUR FOOD WOULD RUN OUT BEFORE YOU GOT THE MONEY TO BUY MORE: NEVER TRUE
HOW OFTEN DO YOU HAVE SIX OR MORE DRINKS ON ONE OCCASION: NEVER
DO YOU BELONG TO ANY CLUBS OR ORGANIZATIONS SUCH AS CHURCH GROUPS UNIONS, FRATERNAL OR ATHLETIC GROUPS, OR SCHOOL GROUPS?: YES
HOW OFTEN DO YOU HAVE A DRINK CONTAINING ALCOHOL: NEVER
HOW HARD IS IT FOR YOU TO PAY FOR THE VERY BASICS LIKE FOOD, HOUSING, MEDICAL CARE, AND HEATING?: NOT HARD AT ALL
HOW OFTEN DO YOU GET TOGETHER WITH FRIENDS OR RELATIVES?: ONCE A WEEK
DO YOU BELONG TO ANY CLUBS OR ORGANIZATIONS SUCH AS CHURCH GROUPS UNIONS, FRATERNAL OR ATHLETIC GROUPS, OR SCHOOL GROUPS?: YES
HOW OFTEN DO YOU ATTEND CHURCH OR RELIGIOUS SERVICES?: MORE THAN 4 TIMES PER YEAR
HOW OFTEN DO YOU ATTEND CHURCH OR RELIGIOUS SERVICES?: MORE THAN 4 TIMES PER YEAR
HOW OFTEN DO YOU ATTENT MEETINGS OF THE CLUB OR ORGANIZATION YOU BELONG TO?: MORE THAN 4 TIMES PER YEAR
HOW OFTEN DO YOU ATTENT MEETINGS OF THE CLUB OR ORGANIZATION YOU BELONG TO?: MORE THAN 4 TIMES PER YEAR
HOW OFTEN DO YOU GET TOGETHER WITH FRIENDS OR RELATIVES?: ONCE A WEEK
HOW MANY DRINKS CONTAINING ALCOHOL DO YOU HAVE ON A TYPICAL DAY WHEN YOU ARE DRINKING: PATIENT DOES NOT DRINK

## 2023-07-31 ASSESSMENT — LIFESTYLE VARIABLES
HOW OFTEN DO YOU HAVE A DRINK CONTAINING ALCOHOL: NEVER
SKIP TO QUESTIONS 9-10: 1
AUDIT-C TOTAL SCORE: 0
HOW MANY STANDARD DRINKS CONTAINING ALCOHOL DO YOU HAVE ON A TYPICAL DAY: PATIENT DOES NOT DRINK
HOW OFTEN DO YOU HAVE SIX OR MORE DRINKS ON ONE OCCASION: NEVER

## 2023-08-01 ENCOUNTER — OFFICE VISIT (OUTPATIENT)
Dept: MEDICAL GROUP | Facility: PHYSICIAN GROUP | Age: 38
End: 2023-08-01
Payer: COMMERCIAL

## 2023-08-01 ENCOUNTER — HOSPITAL ENCOUNTER (OUTPATIENT)
Facility: MEDICAL CENTER | Age: 38
End: 2023-08-01
Attending: INTERNAL MEDICINE
Payer: COMMERCIAL

## 2023-08-01 VITALS
WEIGHT: 262.38 LBS | HEIGHT: 69 IN | RESPIRATION RATE: 18 BRPM | DIASTOLIC BLOOD PRESSURE: 80 MMHG | BODY MASS INDEX: 38.86 KG/M2 | HEART RATE: 78 BPM | SYSTOLIC BLOOD PRESSURE: 132 MMHG | TEMPERATURE: 97.4 F | OXYGEN SATURATION: 97 %

## 2023-08-01 DIAGNOSIS — K21.9 GASTROESOPHAGEAL REFLUX DISEASE WITHOUT ESOPHAGITIS: ICD-10-CM

## 2023-08-01 DIAGNOSIS — R30.0 DYSURIA: ICD-10-CM

## 2023-08-01 DIAGNOSIS — M54.12 CERVICAL RADICULOPATHY: ICD-10-CM

## 2023-08-01 DIAGNOSIS — G43.009 MIGRAINE WITHOUT AURA AND WITHOUT STATUS MIGRAINOSUS, NOT INTRACTABLE: ICD-10-CM

## 2023-08-01 DIAGNOSIS — F32.A DEPRESSION, UNSPECIFIED DEPRESSION TYPE: ICD-10-CM

## 2023-08-01 DIAGNOSIS — F41.9 ANXIETY: Chronic | ICD-10-CM

## 2023-08-01 PROBLEM — M54.2 CERVICALGIA: Status: ACTIVE | Noted: 2023-08-01

## 2023-08-01 LAB
APPEARANCE UR: CLEAR
BILIRUB UR STRIP-MCNC: NORMAL MG/DL
COLOR UR AUTO: NORMAL
GLUCOSE UR STRIP.AUTO-MCNC: NEGATIVE MG/DL
KETONES UR STRIP.AUTO-MCNC: 15 MG/DL
LEUKOCYTE ESTERASE UR QL STRIP.AUTO: NORMAL
NITRITE UR QL STRIP.AUTO: NEGATIVE
PH UR STRIP.AUTO: 5.5 [PH] (ref 5–8)
PROT UR QL STRIP: 30 MG/DL
RBC UR QL AUTO: NORMAL
SP GR UR STRIP.AUTO: >1.03
UROBILINOGEN UR STRIP-MCNC: 0.2 MG/DL

## 2023-08-01 PROCEDURE — 87086 URINE CULTURE/COLONY COUNT: CPT

## 2023-08-01 PROCEDURE — 99215 OFFICE O/P EST HI 40 MIN: CPT | Performed by: INTERNAL MEDICINE

## 2023-08-01 PROCEDURE — 3079F DIAST BP 80-89 MM HG: CPT | Performed by: INTERNAL MEDICINE

## 2023-08-01 PROCEDURE — 3075F SYST BP GE 130 - 139MM HG: CPT | Performed by: INTERNAL MEDICINE

## 2023-08-01 PROCEDURE — 81002 URINALYSIS NONAUTO W/O SCOPE: CPT | Performed by: INTERNAL MEDICINE

## 2023-08-01 RX ORDER — FLUOXETINE HYDROCHLORIDE 40 MG/1
40 CAPSULE ORAL DAILY
Qty: 90 CAPSULE | Refills: 3 | Status: SHIPPED | OUTPATIENT
Start: 2023-08-01

## 2023-08-01 RX ORDER — SULFAMETHOXAZOLE AND TRIMETHOPRIM 800; 160 MG/1; MG/1
1 TABLET ORAL 2 TIMES DAILY
Qty: 10 TABLET | Refills: 0 | Status: SHIPPED | OUTPATIENT
Start: 2023-08-01 | End: 2023-08-18

## 2023-08-01 RX ORDER — PANTOPRAZOLE SODIUM 40 MG/1
40 TABLET, DELAYED RELEASE ORAL DAILY
Qty: 90 TABLET | Refills: 3 | Status: SHIPPED | OUTPATIENT
Start: 2023-08-01

## 2023-08-01 RX ORDER — NITROFURANTOIN 25; 75 MG/1; MG/1
CAPSULE ORAL
COMMUNITY
Start: 2023-05-24 | End: 2023-08-01

## 2023-08-01 ASSESSMENT — FIBROSIS 4 INDEX: FIB4 SCORE: 0.35

## 2023-08-01 NOTE — PROGRESS NOTES
PRIMARY CARE CLINIC VISIT    Chief complaint:    New patient presents today to establish care  Dysuria  Acid reflux  Neck pain  Migraine  Anxiety and depression    History of Present Illness     Dysuria  This is a new condition noted since the last couple of weeks.  The patient reported dysuria and urinary frequency.  Patient denies fever or chills.  She denies vaginal discharge or gross hematuria.    Anxiety  Chronic stable condition per the patient is taking Prozac 40 Mg daily.  Patient tolerating medication well.    Migraine without aura or status migrainosus  This is a chronic condition per the patient followed by neurology service.  The patient is being treated with Emgality injection monthly.  Her condition is well controlled    BMI 40.0-44.9, adult (HCC)  Chronic condition.    Counseling on health consequences related to obesity.  Discussed with the patient regarding diet, exercise, and lifestyle modification to help achieve and maintain healthy weight      With diet and exercise the patient has lost weight    Depression  This is a chronic and stable condition.  The patient is taking Prozac 40 Mg daily.  Patient denies SI.  Patient tolerating medication well.    GERD (gastroesophageal reflux disease)  This is a new condition noticed since her last several weeks.  Patient stated that patient has taken omeprazole without improvement.      Cervical radiculopathy  This is a chronic condition.  Patient denies recent trauma or injury.  She denies motor weakness.  Patient is using computer daily for work.  Patient reported intermittent numbness radiating to the shoulders bilaterally    Current Outpatient Medications on File Prior to Visit   Medication Sig Dispense Refill    EMGALITY 120 MG/ML Solution Auto-injector INJECT 1 SYRINGE SUBCUTANEOUSLY ONCE EVERY MONTH       No current facility-administered medications on file prior to visit.        Allergies: Nkda [no known drug allergy]    Current Outpatient Medications  Ordered in [facility]   Medication Sig Dispense Refill    sulfamethoxazole-trimethoprim (BACTRIM DS) 800-160 MG tablet Take 1 Tablet by mouth 2 times a day. 10 Tablet 0    fluoxetine (PROZAC) 40 MG capsule Take 1 Capsule by mouth every day. 90 Capsule 3    pantoprazole (PROTONIX) 40 MG Tablet Delayed Response Take 1 Tablet by mouth every day. 90 Tablet 3    EMGALITY 120 MG/ML Solution Auto-injector INJECT 1 SYRINGE SUBCUTANEOUSLY ONCE EVERY MONTH       No current Epic-ordered facility-administered medications on file.       Past Medical History:   Diagnosis Date    Abnormal Pap smear     Anxiety     Anxiety disorder 02/08/2022    medicated    Chickenpox     Daytime sleepiness     Dental disorder 02/08/2022    lower permanent retainer    Depression 10/19/2009    Dizziness     Frequent headaches     Hypertension     10 years ago but resolved    Migraine     Morning headache     Rhinitis     Snoring     Tension headache     Wears glasses        Past Surgical History:   Procedure Laterality Date    CA HYSTEROSCOPY,W/ENDOMETRIAL ABLATION N/A 01/27/2023    Procedure: ENDOMETRIAL ABLATION DIAGNOSTIC HYSTEROSCOPY;  Surgeon: Tyler Fischer M.D.;  Location: SURGERY SAME DAY [facility];  Service: Gynecology    STEREOTACTIC COMPUTER ASSISTED PROCEDURE CRANIAL, EXTRADURAL N/A 02/10/2022    Procedure: STEREOTACTIC COMPUTER ASSISTED PROCEDURE CRANIAL,EXTRADURAL;  Surgeon: Art Smiley M.D.;  Location: SURGERY SAME DAY [facility];  Service: Ent    ENDOSCOPY, PARANASAL SINUS, WITH TOTAL ETHMOIDECTOMY, SN Right 02/10/2022    Procedure: ENDOSCOPY, PARANASAL SINUS, WITH TOTAL ETHMOIDECTOMY, MAXILLARY ANTROSTOMY, EXPLORATION FRONTAL SINUS;  Surgeon: Art Smiley M.D.;  Location: SURGERY SAME DAY [facility];  Service: Ent    SEPTOPLASTY         Family History   Problem Relation Age of Onset    Hyperlipidemia Father     Hypertension Father     No Known Problems Sister     No Known Problems Sister     Other Brother         huntings disease  "   [place]'s disease Brother     No Known Problems Brother     No Known Problems Maternal Grandmother     No Known Problems Maternal Grandfather     No Known Problems Paternal Grandmother     No Known Problems Paternal Grandfather        Social History     Tobacco Use   Smoking Status Never   Smokeless Tobacco Never       Social History     Substance and Sexual Activity   Alcohol Use No       Review of systems.  As per HPI above. All other systems reviewed and negative.      Past Medical, Social, and Family history reviewed and updated in EPIC     Objective     /80 (BP Location: Left arm, Patient Position: Sitting, BP Cuff Size: Large adult)   Pulse 78   Temp 36.3 °C (97.4 °F) (Temporal)   Resp 18   Ht 1.753 m (5' 9\")   Wt 119 kg (262 lb 6 oz)   SpO2 97%    Body mass index is 38.75 kg/m².    General: alert in no apparent distress.  Cardiovascular: regular rate and rhythm  Pulmonary: lungs : no wheezing   Gastrointestinal: BS present. No obvious mass noted  Neck: no significant deformity, redness or swelling.   ROM of neck limited due to pain especially neck flexion/extension and lateral rotation.   Moderate tightness noted in the upper trapezius M region bilat.       Urine dipstick negative glucose trace blood protein positive leukocyte esterase small        Lab Results   Component Value Date/Time    WBC 10.8 05/25/2022 10:20 AM    HEMOGLOBIN 14.5 05/25/2022 10:20 AM    HEMATOCRIT 44.3 05/25/2022 10:20 AM    MCV 90.4 05/25/2022 10:20 AM    PLATELETCT 352 05/25/2022 10:20 AM         Lab Results   Component Value Date/Time    SODIUM 140 12/13/2021 06:07 AM    POTASSIUM 4.1 12/13/2021 06:07 AM    GLUCOSE 91 12/13/2021 06:07 AM    BUN 17 12/13/2021 06:07 AM    CREATININE 0.60 12/13/2021 06:07 AM       Lab Results   Component Value Date/Time    CHOLSTRLTOT 175 07/17/2019 07:30 AM    TRIGLYCERIDE 85 07/17/2019 07:30 AM    HDL 43 07/17/2019 07:30 AM     (H) 07/17/2019 07:30 AM       Lab Results "   Component Value Date/Time    ALTSGPT 25 12/13/2021 06:07 AM             Assessment and Plan     1. Dysuria  This is a new condition.  Urine culture ordered.  Patient was started apparently on antibiotic Bactrim potential side effect of medication discussed with the patient advised to increase fluid intake follow-up if not better.  Other orders  - sulfamethoxazole-trimethoprim (BACTRIM DS) 800-160 MG tablet; Take 1 Tablet by mouth 2 times a day.  Dispense: 10 Tablet; Refill: 0  - POCT Urinalysis  - URINE CULTURE(NEW); Future    2. Migraine without aura and without status migrainosus, not intractable  Chronic stable condition.  Continue Emgality injection monthly.  Continue follow-up with neurology service.    3. Depression, unspecified depression type  Chronic stable condition.  Continue with Prozac 40 Mg daily.    - fluoxetine (PROZAC) 40 MG capsule; Take 1 Capsule by mouth every day.  Dispense: 90 Capsule; Refill: 3    5. Cervical radiculopathy  Chronic condition.  Uncontrolled.    - Referral to Physical Therapy  - Advised pt re: neck/ back care, posture and regular stretching exercises.   Rec to maintain ideal weight.   Ice/heat application as directed.  Avoid heavy lifting or activities which may aggravate pt's condition.   Consider the use of over the counter topical treatment such as Icy/hot or Biofreeze    Consider doing MRI if no improvement noted after physical therapy treatment.      6. Gastroesophageal reflux disease without esophagitis  - DX-UPPER GI-AIR CONTRAST; Future  This is a new condition.  Prescribed Protonix 40 Mg daily.  Upper GI test ordered.  Recommend follow-up after imaging is done.    7. Anxiety  Chronic stable condition.  Continue with Prozac 40 Mg daily.    8. BMI 40.0-44.9, adult (HCC)  Chronic condition.  With diet and exercise the patient reported weight loss.      Attestation: I spent:   43  min -  That includes time for chart review before the visit, the actual patient visit, and  time spent on documentation in EMR after the visit.  Chart review/prep, review of other providers' records, imaging/lab review, face-to-face time for history/examination, ordering, prescribing,  review of results/meds/ treatment plan with patient, and care coordination.                    Please note that this dictation was created using voice recognition software. I have made every reasonable attempt to correct obvious errors, but I expect that there are errors of grammar and possibly content that I did not discover before finalizing the note.    Iftikhar Sparks MD  Internal Medicine  [facility]

## 2023-08-01 NOTE — ASSESSMENT & PLAN NOTE
This is a chronic condition.  Patient denies recent trauma or injury.  She denies motor weakness.  Patient is using computer daily for work.  Patient reported intermittent numbness radiating to the shoulders bilaterally

## 2023-08-01 NOTE — ASSESSMENT & PLAN NOTE
This is a chronic and stable condition.  The patient is taking Prozac 40 Mg daily.  Patient denies SI.  Patient tolerating medication well.

## 2023-08-01 NOTE — ASSESSMENT & PLAN NOTE
This is a chronic condition per the patient followed by neurology service.  The patient is being treated with Emgality injection monthly.  Her condition is well controlled

## 2023-08-01 NOTE — ASSESSMENT & PLAN NOTE
This is a new condition noticed since her last several weeks.  Patient stated that patient has taken omeprazole without improvement.

## 2023-08-01 NOTE — ASSESSMENT & PLAN NOTE
Chronic stable condition per the patient is taking Prozac 40 Mg daily.  Patient tolerating medication well.

## 2023-08-01 NOTE — ASSESSMENT & PLAN NOTE
Chronic condition.    Counseling on health consequences related to obesity.  Discussed with the patient regarding diet, exercise, and lifestyle modification to help achieve and maintain healthy weight      With diet and exercise the patient has lost weight

## 2023-08-01 NOTE — ASSESSMENT & PLAN NOTE
This is a new condition noted since the last couple of weeks.  The patient reported dysuria and urinary frequency.  Patient denies fever or chills.  She denies vaginal discharge or gross hematuria.

## 2023-08-03 LAB
BACTERIA UR CULT: NORMAL
SIGNIFICANT IND 70042: NORMAL
SITE SITE: NORMAL
SOURCE SOURCE: NORMAL

## 2023-08-09 ENCOUNTER — TELEPHONE (OUTPATIENT)
Dept: NEUROLOGY | Facility: MEDICAL CENTER | Age: 38
End: 2023-08-09
Payer: COMMERCIAL

## 2023-08-09 ENCOUNTER — HOSPITAL ENCOUNTER (OUTPATIENT)
Dept: LAB | Facility: MEDICAL CENTER | Age: 38
End: 2023-08-09
Attending: PHYSICIAN ASSISTANT
Payer: COMMERCIAL

## 2023-08-09 DIAGNOSIS — R10.816 EPIGASTRIC ABDOMINAL TENDERNESS WITHOUT REBOUND TENDERNESS: ICD-10-CM

## 2023-08-09 LAB
ALBUMIN SERPL BCP-MCNC: 4.2 G/DL (ref 3.2–4.9)
ALBUMIN/GLOB SERPL: 1.3 G/DL
ALP SERPL-CCNC: 107 U/L (ref 30–99)
ALT SERPL-CCNC: 23 U/L (ref 2–50)
ANION GAP SERPL CALC-SCNC: 13 MMOL/L (ref 7–16)
AST SERPL-CCNC: 19 U/L (ref 12–45)
BASOPHILS # BLD AUTO: 0.6 % (ref 0–1.8)
BASOPHILS # BLD: 0.05 K/UL (ref 0–0.12)
BILIRUB SERPL-MCNC: 0.4 MG/DL (ref 0.1–1.5)
BUN SERPL-MCNC: 22 MG/DL (ref 8–22)
CALCIUM ALBUM COR SERPL-MCNC: 9.5 MG/DL (ref 8.5–10.5)
CALCIUM SERPL-MCNC: 9.7 MG/DL (ref 8.5–10.5)
CHLORIDE SERPL-SCNC: 103 MMOL/L (ref 96–112)
CO2 SERPL-SCNC: 22 MMOL/L (ref 20–33)
CREAT SERPL-MCNC: 0.73 MG/DL (ref 0.5–1.4)
D DIMER PPP IA.FEU-MCNC: 0.35 UG/ML (FEU) (ref 0–0.5)
EOSINOPHIL # BLD AUTO: 0.08 K/UL (ref 0–0.51)
EOSINOPHIL NFR BLD: 1 % (ref 0–6.9)
ERYTHROCYTE [DISTWIDTH] IN BLOOD BY AUTOMATED COUNT: 42.6 FL (ref 35.9–50)
FASTING STATUS PATIENT QL REPORTED: NORMAL
GFR SERPLBLD CREATININE-BSD FMLA CKD-EPI: 109 ML/MIN/1.73 M 2
GLOBULIN SER CALC-MCNC: 3.3 G/DL (ref 1.9–3.5)
GLUCOSE SERPL-MCNC: 80 MG/DL (ref 65–99)
HCT VFR BLD AUTO: 45.2 % (ref 37–47)
HGB BLD-MCNC: 14.8 G/DL (ref 12–16)
IMM GRANULOCYTES # BLD AUTO: 0.02 K/UL (ref 0–0.11)
IMM GRANULOCYTES NFR BLD AUTO: 0.3 % (ref 0–0.9)
LIPASE SERPL-CCNC: 41 U/L (ref 11–82)
LYMPHOCYTES # BLD AUTO: 2.54 K/UL (ref 1–4.8)
LYMPHOCYTES NFR BLD: 32.8 % (ref 22–41)
MCH RBC QN AUTO: 28.9 PG (ref 27–33)
MCHC RBC AUTO-ENTMCNC: 32.7 G/DL (ref 32.2–35.5)
MCV RBC AUTO: 88.3 FL (ref 81.4–97.8)
MONOCYTES # BLD AUTO: 0.53 K/UL (ref 0–0.85)
MONOCYTES NFR BLD AUTO: 6.8 % (ref 0–13.4)
NEUTROPHILS # BLD AUTO: 4.52 K/UL (ref 1.82–7.42)
NEUTROPHILS NFR BLD: 58.5 % (ref 44–72)
NRBC # BLD AUTO: 0 K/UL
NRBC BLD-RTO: 0 /100 WBC (ref 0–0.2)
PLATELET # BLD AUTO: 273 K/UL (ref 164–446)
PMV BLD AUTO: 11.7 FL (ref 9–12.9)
POTASSIUM SERPL-SCNC: 4 MMOL/L (ref 3.6–5.5)
PROT SERPL-MCNC: 7.5 G/DL (ref 6–8.2)
RBC # BLD AUTO: 5.12 M/UL (ref 4.2–5.4)
SODIUM SERPL-SCNC: 138 MMOL/L (ref 135–145)
WBC # BLD AUTO: 7.7 K/UL (ref 4.8–10.8)

## 2023-08-09 PROCEDURE — 85379 FIBRIN DEGRADATION QUANT: CPT

## 2023-08-09 PROCEDURE — 83690 ASSAY OF LIPASE: CPT

## 2023-08-09 PROCEDURE — 85025 COMPLETE CBC W/AUTO DIFF WBC: CPT

## 2023-08-09 PROCEDURE — 36415 COLL VENOUS BLD VENIPUNCTURE: CPT

## 2023-08-09 PROCEDURE — 80053 COMPREHEN METABOLIC PANEL: CPT

## 2023-08-09 NOTE — TELEPHONE ENCOUNTER
NEUROLOGY PATIENT PRE-VISIT PLANNING     Patient was NOT contacted to complete PVP.  Note: Patient will not be contacted if there is no indication to call.     Patient Appointment is scheduled as: Established Patient     Is visit type and length scheduled correctly? Yes    [employer] Patient is checked in Patient Demographics? Yes    3.   Is referral attached to visit? Yes    4. Were records received from referring provider? Yes    4. Patient was NOT contacted to have someone accompany them to visit.     5. Is this appointment scheduled as a Hospital Follow-Up?  No    6. Does the patient require any pre procedure or post procedure follow up? No    7. If any orders were placed at last visit or intended to be done for this visit do we have Results/Consult Notes? No  Labs - Labs were not ordered at last office visit.  Imaging - Imaging was not ordered at last office visit.  Referrals -Referral not ordered   Note: If patient appointment is for lab or imaging review and patient did not complete the studies, check with provider if OK to reschedule patient until completed.    8. If patient appointment is for Botox - is order pended for provider? N/A

## 2023-08-14 DIAGNOSIS — N30.00 ACUTE CYSTITIS WITHOUT HEMATURIA: ICD-10-CM

## 2023-08-16 ENCOUNTER — HOSPITAL ENCOUNTER (OUTPATIENT)
Facility: MEDICAL CENTER | Age: 38
End: 2023-08-16
Attending: INTERNAL MEDICINE
Payer: COMMERCIAL

## 2023-08-16 DIAGNOSIS — N30.00 ACUTE CYSTITIS WITHOUT HEMATURIA: ICD-10-CM

## 2023-08-16 PROCEDURE — 87086 URINE CULTURE/COLONY COUNT: CPT

## 2023-08-18 ENCOUNTER — TELEMEDICINE (OUTPATIENT)
Dept: NEUROLOGY | Facility: MEDICAL CENTER | Age: 38
End: 2023-08-18
Attending: NURSE PRACTITIONER
Payer: COMMERCIAL

## 2023-08-18 ENCOUNTER — TELEPHONE (OUTPATIENT)
Dept: NEUROLOGY | Facility: MEDICAL CENTER | Age: 38
End: 2023-08-18
Payer: COMMERCIAL

## 2023-08-18 VITALS — HEART RATE: 80 BPM

## 2023-08-18 DIAGNOSIS — H83.3X3 NOISE-INDUCED HEARING LOSS OF BOTH EARS: ICD-10-CM

## 2023-08-18 DIAGNOSIS — G43.101 MIGRAINE WITH AURA AND WITH STATUS MIGRAINOSUS, NOT INTRACTABLE: ICD-10-CM

## 2023-08-18 PROCEDURE — 99213 OFFICE O/P EST LOW 20 MIN: CPT | Mod: 95 | Performed by: NURSE PRACTITIONER

## 2023-08-18 RX ORDER — GALCANEZUMAB 120 MG/ML
1 INJECTION, SOLUTION SUBCUTANEOUS
Qty: 3 ML | Refills: 3 | Status: SHIPPED | OUTPATIENT
Start: 2023-08-18

## 2023-08-18 ASSESSMENT — PAIN SCALES - GENERAL: PAINLEVEL: NO PAIN

## 2023-08-18 NOTE — TELEPHONE ENCOUNTER
Emgality 120 MG/ML SOAJ    TC paid copay 105.00 #3ml/90 DS patient eligible for copay card insurance is commercial    Request rec'd via ROSA, DANIEL Rios paid copay 105.00 #3ml/90 DS patient eligible for copay card insurance is commercial - notifying liaison [employer]. - 08/18/2023 2:07pm

## 2023-08-18 NOTE — PROGRESS NOTES
"Virtual Visit: Established Patient   This visit was conducted via Zoom using secure and encrypted videoconferencing technology.   The patient was in their home in the state of [place].    The patient's identity was confirmed and verbal consent was obtained for this virtual visit.     Subjective:   CC: Chronic Migraine    Last seen per myself in Dr Yang's office, [facility]    Nataliia Merida is a 38 y.o. female presenting for evaluation and management of:    Emgality \"has been a life saver\" which was started 2-3 years ago.    Uses the injection on the 12th of the month.  If she misses a few days of the injection then her headaches ramp-up.    Occasional headache primarily related to dehydration.    Typical migraine:  Aura-- frontal onset, foggy headed, feels tired.    Rapid onset of headache pain from close to the time of aura onset.  Historically    Prior to Emgality injections being started--- she experienced status migrainous.    Abortive Plan:  Excedrin migraine tablets  Vicodin tablets    Vitamins/Supplements:  Daily MVI    Hearing: feels like higher frequencies are harder    Most recent labs: 8/2023 [facility]    Brain MRI 2015:    Impression    1.  There are a few scattered punctate areas of of increased T2 signal intensity in the periventricular white matter consistent with areas of chronic ischemia, demyelination, or gliosis.     2.  No change from previous exam..      ROS     GERD symptoms: unrelieved with TUMS--   Upper GI scope scheduled per [facility] PCP    Current medicines (including changes today)  Current Outpatient Medications   Medication Sig Dispense Refill    sulfamethoxazole-trimethoprim (BACTRIM DS) 800-160 MG tablet Take 1 Tablet by mouth 2 times a day. 10 Tablet 0    fluoxetine (PROZAC) 40 MG capsule Take 1 Capsule by mouth every day. 90 Capsule 3    pantoprazole (PROTONIX) 40 MG Tablet Delayed Response Take 1 Tablet by mouth every day. 90 Tablet 3    EMGALITY 120 MG/ML " Solution Auto-injector INJECT 1 SYRINGE SUBCUTANEOUSLY ONCE EVERY MONTH       No current facility-administered medications for this visit.       Patient Active Problem List    Diagnosis Date Noted    Dysuria 08/01/2023    Depression 08/01/2023    Cervical radiculopathy 08/01/2023    GERD (gastroesophageal reflux disease) 08/01/2023    Menometrorrhagia 01/27/2023    BMI 40.0-44.9, adult ([facility]) 03/10/2017    Migraine without aura or status migrainosus 11/03/2015    Anxiety 01/23/2015        Objective:   There were no vitals taken for this visit.    Physical Exam:  Constitutional: Alert, no distress, well-groomed.  Skin: No rashes in visible areas.  Eye: Round. Conjunctiva clear, lids normal. No icterus.   ENMT: Lips pink without lesions, good dentition, moist mucous membranes. Phonation normal.  Neck: No masses, no thyromegaly. Moves freely without pain.  Respiratory: Unlabored respiratory effort, no cough or audible wheeze  Psych: Alert and oriented x3, normal affect and mood.     Assessment and Plan:   The following treatment plan was discussed:     Chronic Intractable Migraine Headaches:  Overall greatly improved with the addition of Emgality 2-3 years ago.  Finds that if she is overdue 2-3 days with the monthly Emgality dose that her migraines ramp-up.    Minimal abortive rescue medication used.  Well-educated regarding limiting usage of Excedrin migraine and Vicodin prescribed.    Bilateral Hearing Loss:  Referral to audiology placed.    New Rx for Emgality, Qt 3, Refills: 3  No abortive treatments prescribed at this time.    Consider serial Brain MRI if migraine symptoms evolve or experiences new symptoms.    Follow-up: in 6 months      Discussed migraines and migraine treatment.  Discussed importance of healthy lifestyle including eating routine meals and snacks, staying well hydrated, good sleep hygiene and exercise.

## 2023-08-19 LAB
BACTERIA UR CULT: NORMAL
SIGNIFICANT IND 70042: NORMAL
SITE SITE: NORMAL
SOURCE SOURCE: NORMAL

## 2023-08-22 ENCOUNTER — HOSPITAL ENCOUNTER (OUTPATIENT)
Dept: RADIOLOGY | Facility: MEDICAL CENTER | Age: 38
End: 2023-08-22
Attending: INTERNAL MEDICINE
Payer: COMMERCIAL

## 2023-08-22 DIAGNOSIS — K21.9 GASTROESOPHAGEAL REFLUX DISEASE WITHOUT ESOPHAGITIS: ICD-10-CM

## 2023-08-22 DIAGNOSIS — K21.9 GASTROESOPHAGEAL REFLUX DISEASE WITHOUT ESOPHAGITIS: Chronic | ICD-10-CM

## 2023-08-22 DIAGNOSIS — R10.13 DYSPEPSIA: ICD-10-CM

## 2023-08-22 PROBLEM — R93.3 ABNORMAL FINDING ON GI TRACT IMAGING: Status: ACTIVE | Noted: 2023-08-22

## 2023-08-22 PROCEDURE — 700112 HCHG RX REV CODE 229: Performed by: INTERNAL MEDICINE

## 2023-08-22 PROCEDURE — A9270 NON-COVERED ITEM OR SERVICE: HCPCS | Performed by: INTERNAL MEDICINE

## 2023-08-22 PROCEDURE — 74246 X-RAY XM UPR GI TRC 2CNTRST: CPT

## 2023-08-22 RX ADMIN — ANTACID/ANTIFLATULENT 1 PACKET: 380; 550; 10; 10 GRANULE, EFFERVESCENT ORAL at 10:00

## 2023-09-19 DIAGNOSIS — G43.101 MIGRAINE WITH AURA AND WITH STATUS MIGRAINOSUS, NOT INTRACTABLE: ICD-10-CM

## 2023-09-19 RX ORDER — HYDROCODONE BITARTRATE AND ACETAMINOPHEN 5; 325 MG/1; MG/1
1 TABLET ORAL EVERY 6 HOURS PRN
Qty: 20 TABLET | Refills: 0 | Status: SHIPPED | OUTPATIENT
Start: 2023-09-19 | End: 2023-12-18

## 2023-10-02 ENCOUNTER — APPOINTMENT (OUTPATIENT)
Dept: PHYSICAL THERAPY | Facility: REHABILITATION | Age: 38
End: 2023-10-02
Attending: INTERNAL MEDICINE
Payer: COMMERCIAL

## 2023-10-11 ENCOUNTER — APPOINTMENT (OUTPATIENT)
Dept: PHYSICAL THERAPY | Facility: REHABILITATION | Age: 38
End: 2023-10-11
Attending: INTERNAL MEDICINE
Payer: COMMERCIAL

## 2023-10-18 ENCOUNTER — APPOINTMENT (OUTPATIENT)
Dept: PHYSICAL THERAPY | Facility: REHABILITATION | Age: 38
End: 2023-10-18
Attending: INTERNAL MEDICINE
Payer: COMMERCIAL

## 2023-10-25 ENCOUNTER — APPOINTMENT (OUTPATIENT)
Dept: PHYSICAL THERAPY | Facility: REHABILITATION | Age: 38
End: 2023-10-25
Attending: INTERNAL MEDICINE
Payer: COMMERCIAL

## 2023-11-01 ENCOUNTER — APPOINTMENT (OUTPATIENT)
Dept: PHYSICAL THERAPY | Facility: REHABILITATION | Age: 38
End: 2023-11-01
Attending: INTERNAL MEDICINE
Payer: COMMERCIAL

## 2024-02-01 ENCOUNTER — TELEMEDICINE (OUTPATIENT)
Dept: MEDICAL GROUP | Facility: PHYSICIAN GROUP | Age: 39
End: 2024-02-01
Payer: COMMERCIAL

## 2024-02-01 VITALS — HEIGHT: 69 IN | BODY MASS INDEX: 37.77 KG/M2 | WEIGHT: 255 LBS

## 2024-02-01 DIAGNOSIS — F32.A DEPRESSION, UNSPECIFIED DEPRESSION TYPE: Chronic | ICD-10-CM

## 2024-02-01 DIAGNOSIS — F12.90 MARIJUANA USER: ICD-10-CM

## 2024-02-01 DIAGNOSIS — G43.009 MIGRAINE WITHOUT AURA AND WITHOUT STATUS MIGRAINOSUS, NOT INTRACTABLE: Chronic | ICD-10-CM

## 2024-02-01 DIAGNOSIS — K21.9 GASTROESOPHAGEAL REFLUX DISEASE WITHOUT ESOPHAGITIS: Chronic | ICD-10-CM

## 2024-02-01 DIAGNOSIS — R30.0 DYSURIA: Chronic | ICD-10-CM

## 2024-02-01 PROBLEM — E78.5 DYSLIPIDEMIA: Chronic | Status: ACTIVE | Noted: 2024-02-01

## 2024-02-01 PROBLEM — E78.5 DYSLIPIDEMIA: Status: ACTIVE | Noted: 2024-02-01

## 2024-02-01 PROCEDURE — 99214 OFFICE O/P EST MOD 30 MIN: CPT | Mod: 95 | Performed by: INTERNAL MEDICINE

## 2024-02-01 ASSESSMENT — FIBROSIS 4 INDEX: FIB4 SCORE: 0.52

## 2024-02-01 ASSESSMENT — PATIENT HEALTH QUESTIONNAIRE - PHQ9: CLINICAL INTERPRETATION OF PHQ2 SCORE: 0

## 2024-02-02 NOTE — ASSESSMENT & PLAN NOTE
Chronic condition.  Patient is being treated with Protonix 40 Mg daily.  Patient denies nausea vomiting dysphagia or unexplained weight loss.

## 2024-02-02 NOTE — PROGRESS NOTES
This visit was conducted via  [employer] Video Virtual Visit using secure and encrypted videoconferencing technology.   The patient was at home in the state [facility]  The patient's identity was confirmed and verbal consent was obtained for this virtual visit.  -------------------------------------------------------------------------------    Chief Complaint   Patient presents with    Follow-Up     Weight loss      Overweight treatment options  Depression  Acid reflux  Migraine    HPI: This is a 38 y.o. pt.  Pt's medical history is notable for:      BMI 40.0-44.9, adult (HCC)  Chronic condition.  Discussed with the patient regarding diet, exercise, and lifestyle modification to help achieve and maintain healthy weight      Patient is interested in trying Wegovy or Ozempic.  The patient stated that she will contact her insurance to check for any requirement.    We also discussed with the patient regarding the use of phentermine.  The patient has taken this medication before without any side effects  This provider was about to write prescription for the patient for phentermine.  I have also informed the patient that this is a controlled substance and that the patient would need to sign the consent form and to do a urine drug test.    Patient stated that she is presently using marijuana.  Informed the patient that this is against [facility] policy and that I am unable to prescribe any controlled substance while the patient is using marijuana.    Therefore the patient is requesting to be referred to [facility] the medical weight loss referral program.    Referral form filled out .         Depression  Chronic condition.  Patient being treated with Prozac 40 Mg daily.  Patient tolerating medication well.  Patient denies SI.    GERD (gastroesophageal reflux disease)  Chronic condition.  Patient is being treated with Protonix 40 Mg daily.  Patient denies nausea vomiting dysphagia or unexplained weight loss.    Migraine without aura  or status migrainosus  This is a chronic condition.  Patient followed by neurology service.  Patient is being treated with Emgality injection every 30 days.  Patient tolerating medication well..  No new symptoms reported.    Marijuana user  Chronic condition.  Strongly advised the patient to discontinue.         Allergies: Nkda [no known drug allergy]    Current Outpatient Medications Ordered in Epic   Medication Sig Dispense Refill    EMGALITY 120 MG/ML Solution Auto-injector Inject 1 Syringe under the skin every 30 (thirty) days. 3 mL 3    fluoxetine (PROZAC) 40 MG capsule Take 1 Capsule by mouth every day. 90 Capsule 3    pantoprazole (PROTONIX) 40 MG Tablet Delayed Response Take 1 Tablet by mouth every day. 90 Tablet 3     No current Epic-ordered facility-administered medications on file.       Past Medical History:   Diagnosis Date    Abnormal Pap smear     Anxiety     Anxiety disorder 02/08/2022    medicated    Chickenpox     Daytime sleepiness     Dental disorder 02/08/2022    lower permanent retainer    Depression 10/19/2009    Dizziness     Frequent headaches     Hypertension     10 years ago but resolved    Migraine     Morning headache     Rhinitis     Snoring     Tension headache     Wears glasses        Past Surgical History:   Procedure Laterality Date    [place] HYSTEROSCOPY,W/ENDOMETRIAL ABLATION N/A 01/27/2023    Procedure: ENDOMETRIAL ABLATION DIAGNOSTIC HYSTEROSCOPY;  Surgeon: Tyler Fischer M.D.;  Location: SURGERY SAME DAY [facility];  Service: Gynecology    STEREOTACTIC COMPUTER ASSISTED PROCEDURE CRANIAL, EXTRADURAL N/A 02/10/2022    Procedure: STEREOTACTIC COMPUTER ASSISTED PROCEDURE CRANIAL,EXTRADURAL;  Surgeon: Art Smiley M.D.;  Location: SURGERY SAME DAY [facility];  Service: Ent    ENDOSCOPY, PARANASAL SINUS, WITH TOTAL ETHMOIDECTOMY, SN Right 02/10/2022    Procedure: ENDOSCOPY, PARANASAL SINUS, WITH TOTAL ETHMOIDECTOMY, MAXILLARY ANTROSTOMY, EXPLORATION FRONTAL SINUS;  Surgeon: Art GAO  "LUL Smiley;  Location: SURGERY [facility] DAY [facility];  Service: Ent    SEPTOPLASTY         Family History   Problem Relation Age of Onset    Hyperlipidemia Father     Hypertension Father     No Known Problems Sister     No Known Problems Sister     Other Brother         huntings disease    [place]'s disease Brother     No Known Problems Brother     No Known Problems Maternal Grandmother     No Known Problems Maternal Grandfather     No Known Problems Paternal Grandmother     No Known Problems Paternal Grandfather        Social History     Tobacco Use   Smoking Status Never   Smokeless Tobacco Never       Social History     Substance and Sexual Activity   Alcohol Use No         Review of systems:  As per HPI above. All other systems reviewed and negative.      Past Medical, Social, and Family history reviewed and updated in EPIC    ------------------------------------------------------------------    There were no vitals filed for this visit.  Vitals:    02/01/24 1607   Weight: 116 kg (255 lb)   Height: 1.753 m (5' 9\")        PHYSICAL EXAM:   Psych:  A&O x 3, mood and affect appropriate  Constitutional: no distress  Skin: No apparent rashes  ENMT: Lips without lesions. Phonation normal.  Respiratory: Unlabored respiratory effort        No results found for: \"HBA1C\"    Lab Results   Component Value Date/Time    WBC 7.7 08/09/2023 01:31 PM    HEMOGLOBIN 14.8 08/09/2023 01:31 PM    HEMATOCRIT 45.2 08/09/2023 01:31 PM    MCV 88.3 08/09/2023 01:31 PM    PLATELETCT 273 08/09/2023 01:31 PM         Lab Results   Component Value Date/Time    SODIUM 138 08/09/2023 01:31 PM    POTASSIUM 4.0 08/09/2023 01:31 PM    GLUCOSE 80 08/09/2023 01:31 PM    BUN 22 08/09/2023 01:31 PM    CREATININE 0.73 08/09/2023 01:31 PM       Lab Results   Component Value Date/Time    ALTSGPT 23 08/09/2023 01:31 PM           ---------------------------------------------------------------------    ASSESSMENT and PLAN:     1. BMI 40.0-44.9, adult " (HCC)  Chronic condition.  Recommended diet and lifestyle modification.  Encouraged the patient to lose weight  Referral form filled out today for the medical weight loss program form health.    In the meantime the patient will contact her insurance to check for any requirement regarding Wegovy or Ozempic or Mounjaro treatment.    2. Depression, unspecified depression type  Chronic stable.  Continue Prozac 40 Mg daily.    3. Gastroesophageal reflux disease without esophagitis  Chronic stable condition.  Continue pantoprazole 40 Mg daily.    4. Migraine without aura and without status migrainosus, not intractable  Chronic condition.  Stable.  Continue Emgality injection 1 every 30 days.  Continue follow-up with neurology service as directed.    6. Marijuana user  Chronic condition.  Strongly advised the patient to discontinue using marijuana.            Attestation: I spent:   33   min -  That includes time for chart review before the visit, the actual patient visit, and time spent on documentation in EMR after the visit.  Chart review/prep, review of other providers' records, imaging/lab review, face-to-face time for history/examination, pt's counseling/education, ordering, prescribing,  review of results/meds/ treatment plan with patient, and care coordination.             Please note that this dictation was created using voice recognition software. I have made every reasonable attempt to correct obvious errors, but I expect that there are errors of grammar and possibly content that I did not discover before finalizing the note.       Iftikhar Sparks MD  Internal Medicine  [facility]

## 2024-02-02 NOTE — ASSESSMENT & PLAN NOTE
Chronic condition.  Discussed with the patient regarding diet, exercise, and lifestyle modification to help achieve and maintain healthy weight      Patient is interested in trying Wegovy or Ozempic.  The patient stated that she will contact her insurance to check for any requirement.    We also discussed with the patient regarding the use of phentermine.  The patient has taken this medication before without any side effects  This provider was about to write prescription for the patient for phentermine.  I have also informed the patient that this is a controlled substance and that the patient would need to sign the consent form and to do a urine drug test.    Patient stated that she is presently using marijuana.  Informed the patient that this is against [facility] policy and that I am unable to prescribe any controlled substance while the patient is using marijuana.    Therefore the patient is requesting to be referred to [facility] the medical weight loss referral program.    Referral form filled out .

## 2024-02-02 NOTE — ASSESSMENT & PLAN NOTE
Chronic condition.  Patient being treated with Prozac 40 Mg daily.  Patient tolerating medication well.  Patient denies SI.

## 2024-02-02 NOTE — ASSESSMENT & PLAN NOTE
This is a chronic condition.  Patient followed by neurology service.  Patient is being treated with Emgality injection every 30 days.  Patient tolerating medication well..  No new symptoms reported.

## 2024-02-22 RX ORDER — SEMAGLUTIDE 0.25 MG/.5ML
0.25 INJECTION, SOLUTION SUBCUTANEOUS
Qty: 4 EACH | Refills: 0 | Status: SHIPPED | OUTPATIENT
Start: 2024-02-22

## 2024-02-22 RX ORDER — SEMAGLUTIDE 0.5 MG/.5ML
0.5 INJECTION, SOLUTION SUBCUTANEOUS
Qty: 12 EACH | Refills: 3 | Status: SHIPPED | OUTPATIENT
Start: 2024-02-22

## 2024-03-26 ENCOUNTER — PATIENT MESSAGE (OUTPATIENT)
Dept: NEUROLOGY | Facility: MEDICAL CENTER | Age: 39
End: 2024-03-26
Payer: COMMERCIAL

## 2024-03-29 NOTE — PATIENT COMMUNICATION
Patient need to establish with a different provider. Previous [facility] Du. Patient notified. Rcahel Shields MA.

## 2024-05-02 ENCOUNTER — APPOINTMENT (OUTPATIENT)
Dept: NEUROLOGY | Facility: MEDICAL CENTER | Age: 39
End: 2024-05-02
Payer: COMMERCIAL

## 2024-05-06 ENCOUNTER — TELEMEDICINE (OUTPATIENT)
Dept: MEDICAL GROUP | Facility: PHYSICIAN GROUP | Age: 39
End: 2024-05-06
Payer: COMMERCIAL

## 2024-05-06 VITALS — BODY MASS INDEX: 38.51 KG/M2 | WEIGHT: 260 LBS | HEIGHT: 69 IN

## 2024-05-06 DIAGNOSIS — K21.9 GASTROESOPHAGEAL REFLUX DISEASE WITHOUT ESOPHAGITIS: Chronic | ICD-10-CM

## 2024-05-06 DIAGNOSIS — E78.5 DYSLIPIDEMIA: Chronic | ICD-10-CM

## 2024-05-06 DIAGNOSIS — G43.009 MIGRAINE WITHOUT AURA AND WITHOUT STATUS MIGRAINOSUS, NOT INTRACTABLE: Chronic | ICD-10-CM

## 2024-05-06 DIAGNOSIS — F32.A DEPRESSION, UNSPECIFIED DEPRESSION TYPE: Chronic | ICD-10-CM

## 2024-05-06 DIAGNOSIS — J34.89 SINUS PRESSURE: ICD-10-CM

## 2024-05-06 DIAGNOSIS — Z00.00 WELL ADULT EXAM: ICD-10-CM

## 2024-05-06 PROCEDURE — 99214 OFFICE O/P EST MOD 30 MIN: CPT | Performed by: INTERNAL MEDICINE

## 2024-05-06 RX ORDER — AMOXICILLIN AND CLAVULANATE POTASSIUM 875; 125 MG/1; MG/1
1 TABLET, FILM COATED ORAL 2 TIMES DAILY
Qty: 14 TABLET | Refills: 0 | Status: SHIPPED | OUTPATIENT
Start: 2024-05-06

## 2024-05-06 ASSESSMENT — FIBROSIS 4 INDEX: FIB4 SCORE: 0.54

## 2024-05-06 NOTE — PROGRESS NOTES
This visit was conducted via  Zoom Video Virtual Visit using secure and encrypted videoconferencing technology.   The patient was at home in the state of [place]  The patient's identity was confirmed and verbal consent was obtained for this virtual visit.  -------------------------------------------------------------------------------    Chief Complaint   Patient presents with    Sinus Problem      Sinus pressure  Migraine  Overweight  Depression  Acid reflux  Hyperlipidemia      HPI: This is a 39 y.o. pt.  Pt's medical history is notable for:      Migraine without aura or status migrainosus  Chronic condition.  Patient followed by neurology specialist.  Patient using Emgality injection every 30 days.  Patient tolerated medication well.    BMI 40.0-44.9, adult (HCC)  Chronic condition.  Discussed with the patient regarding diet, exercise, and lifestyle modification to help achieve and maintain healthy weight         Depression  Chronic condition.  Patient presently taking Prozac 40 Mg daily.  Patient tolerating medication well.  The patient denies SI.    GERD (gastroesophageal reflux disease)  Chronic condition.  The patient is taking pantoprazole 40 Mg daily.  Patient tolerating medication well.    Dyslipidemia  This is a chronic condition.  The patient currently on diet therapy.    Sinus pressure  New condition.  The patient reported symptoms started since the past 3 weeks.  Patient has tried different medication counter without improvement.  The patient denies fever or chills.  She denies shortness of breath or wheezing.         Allergies: Nkda [no known drug allergy]    Current Outpatient Medications Ordered in Epic   Medication Sig Dispense Refill    amoxicillin-clavulanate (AUGMENTIN) 875-125 MG Tab Take 1 Tablet by mouth 2 times a day. 14 Tablet 0    EMGALITY 120 MG/ML Solution Auto-injector Inject 1 Syringe under the skin every 30 (thirty) days. 3 mL 3    fluoxetine (PROZAC) 40 MG capsule Take 1 Capsule by  mouth every day. 90 Capsule 3    pantoprazole (PROTONIX) 40 MG Tablet Delayed Response Take 1 Tablet by mouth every day. 90 Tablet 3    Semaglutide (WEGOVY) 0.25 MG/0.5ML Solution Auto-injector Pen-injector Inject 0.5 mL under the skin every 7 days. 4 Each 0    Semaglutide (WEGOVY) 0.5 MG/0.5ML Solution Auto-injector Pen-injector Inject 0.5 mL under the skin every 7 days. 12 Each 3     No current Epic-ordered facility-administered medications on file.       Past Medical History:   Diagnosis Date    Abnormal Pap smear     Anxiety     Anxiety disorder 02/08/2022    medicated    Chickenpox     Daytime sleepiness     Dental disorder 02/08/2022    lower permanent retainer    Depression 10/19/2009    Dizziness     Frequent headaches     Hypertension     10 years ago but resolved    Migraine     Morning headache     Rhinitis     Snoring     Tension headache     Wears glasses        Past Surgical History:   Procedure Laterality Date    NM HYSTEROSCOPY,W/ENDOMETRIAL ABLATION N/A 01/27/2023    Procedure: ENDOMETRIAL ABLATION DIAGNOSTIC HYSTEROSCOPY;  Surgeon: Tyler Fischer M.D.;  Location: SURGERY SAME DAY [facility];  Service: Gynecology    STEREOTACTIC COMPUTER ASSISTED PROCEDURE CRANIAL, EXTRADURAL N/A 02/10/2022    Procedure: STEREOTACTIC COMPUTER ASSISTED PROCEDURE CRANIAL,EXTRADURAL;  Surgeon: Art Smiley M.D.;  Location: SURGERY SAME DAY [facility];  Service: Ent    ENDOSCOPY, PARANASAL SINUS, WITH TOTAL ETHMOIDECTOMY, SN Right 02/10/2022    Procedure: ENDOSCOPY, PARANASAL SINUS, WITH TOTAL ETHMOIDECTOMY, MAXILLARY ANTROSTOMY, EXPLORATION FRONTAL SINUS;  Surgeon: Art Smiley M.D.;  Location: SURGERY SAME DAY [facility];  Service: Ent    SEPTOPLASTY         Family History   Problem Relation Age of Onset    Hyperlipidemia Father     Hypertension Father     No Known Problems Sister     No Known Problems Sister     Other Brother         huntings disease    Livingston's disease Brother     No Known Problems Brother   "   No Known Problems Maternal Grandmother     No Known Problems Maternal Grandfather     No Known Problems Paternal Grandmother     No Known Problems Paternal Grandfather        Social History     Tobacco Use   Smoking Status Never   Smokeless Tobacco Never       Social History     Substance and Sexual Activity   Alcohol Use No         Review of systems:  As per HPI above. All other systems reviewed and negative.      Past Medical, Social, and Family history reviewed and updated in EPIC    ---------------------------------------------------------------     LAB DATA:    No results found for: \"HBA1C\"    Lab Results   Component Value Date/Time    WBC 7.7 08/09/2023 01:31 PM    HEMOGLOBIN 14.8 08/09/2023 01:31 PM    HEMATOCRIT 45.2 08/09/2023 01:31 PM    MCV 88.3 08/09/2023 01:31 PM    PLATELETCT 273 08/09/2023 01:31 PM       Lab Results   Component Value Date/Time    SODIUM 138 08/09/2023 01:31 PM    POTASSIUM 4.0 08/09/2023 01:31 PM    GLUCOSE 80 08/09/2023 01:31 PM    BUN 22 08/09/2023 01:31 PM    CREATININE 0.73 08/09/2023 01:31 PM       Lab Results   Component Value Date/Time    CHOLSTRLTOT 175 07/17/2019 07:30 AM    TRIGLYCERIDE 85 07/17/2019 07:30 AM    HDL 43 07/17/2019 07:30 AM     (H) 07/17/2019 07:30 AM       Lab Results   Component Value Date/Time    ALTSGPT 23 08/09/2023 01:31 PM       ------------------------------------------------------------------    There were no vitals filed for this visit.  Vitals:    05/06/24 1114   Weight: 118 kg (260 lb)   Height: 1.753 m (5' 9\")        PHYSICAL EXAM:   Psych:  A&O x 3, mood and affect appropriate  Constitutional: no distress  Skin: No apparent rashes  ENMT: Lips without lesions. Phonation normal.  Respiratory: Unlabored respiratory effort      ---------------------------------------------------------------------    ASSESSMENT and PLAN:     1. Sinus pressure  This is an acute problem.  Uncontrolled  Discussed with the patient that ideally the patient should " be seen in person to be evaluated  Shared decision making.  Will start the patient empirically on antibiotic Augmentin  Twice daily for 1 week.  If no improvement the patient recommend to return in person to be evaluated.  The patient voiced understanding.  Potential side effect of medication discussed with the patient    Other orders  - amoxicillin-clavulanate (AUGMENTIN) 875-125 MG Tab; Take 1 Tablet by mouth 2 times a day.  Dispense: 14 Tablet; Refill: 0         2. Migraine without aura and without status migrainosus, not intractable  Chronic stable condition.  Continue with Emgality 120 Mg injection every 30 days.  Continue follow-up with neurology service.    3. BMI 40.0-44.9, adult (HCC)  Chronic condition per uncontrolled but recommend diet and lifestyle modification.  Encouraged patient to maintain ideal weight.  The patient will check with her insurance whether or not Ozempic/Wegovy treatment would be covered.    4. Depression, unspecified depression type  Stable condition.  Continue Prozac 40 Mg daily.      5. Gastroesophageal reflux disease without esophagitis  Chronic stable condition.  Continue pantoprazole 40 Mg daily    6. Dyslipidemia  - ALANINE AMINO-TRANS; Future  - Lipid Profile; Future  Chronic condition.  Current status unclear.  Lab test ordered for follow-up.    7. Well adult exam  - HEMOGLOBIN A1C; Future  - Basic Metabolic Panel; Future  - TSH; Future                Attestation: I spent:   31    minutes   -That includes time for chart review before the visit, the actual patient visit, and time spent on documentation in EMR after the visit.  Chart review/prep, review of other providers' records, imaging/lab review, face-to-face time for history/examination, pt's counseling/education, ordering, prescribing,  review of results/meds/ treatment plan with patient, and care coordination.   --------------------------------------------------------------------                   Please note that this  dictation was created using voice recognition software. I have made every reasonable attempt to correct obvious errors, but I expect that there are errors of grammar and possibly content that I did not discover before finalizing the note.       Iftikhar Sparks MD  Internal Medicine  [facility]

## 2024-05-06 NOTE — ASSESSMENT & PLAN NOTE
Chronic condition.  Patient presently taking Prozac 40 Mg daily.  Patient tolerating medication well.  The patient denies SI.

## 2024-05-06 NOTE — ASSESSMENT & PLAN NOTE
Chronic condition.  Patient followed by neurology specialist.  Patient using Emgality injection every 30 days.  Patient tolerated medication well.

## 2024-05-06 NOTE — ASSESSMENT & PLAN NOTE
New condition.  The patient reported symptoms started since the past 3 weeks.  Patient has tried different medication counter without improvement.  The patient denies fever or chills.  She denies shortness of breath or wheezing.

## 2024-05-06 NOTE — ASSESSMENT & PLAN NOTE
Chronic condition.  The patient is taking pantoprazole 40 Mg daily.  Patient tolerating medication well.

## 2024-06-14 NOTE — PROGRESS NOTES
[facility]  GENERAL NEUROLOGY  NEW PATIENT VISIT      HISTORY OF ILLNESS:  Nataliia Merida is a 39 y.o. woman with a history most notable for migraine. She is a former patient of Chiquita PULIDO and was being treated with emgality. Today, Nataliia provided the following history:    ***    The following is a summary of headache symptoms, presented in my standard format:    Family History:   Age at onset (years):   Location:   Radiation:   Frequency:   Duration:   Headache Days/Month:   Quality:   Intensity:   Aura:   Photophobia/Phonophobia/Nausea/Vomiting:   Provoked by Physical Activity?:   Triggers:   Associated Symptoms:   Autonomic Signs (such as ptosis, miosis, conjunctival injection, rhinorrhea, increased lacrimation):   Head Trauma:   Association with Menses:   ED Visits:   Hospitalizations:   Missed Work Days ():   Sleep (hours/night):   Caffeine Intake:   Hydration:   Nutrition:   Exercise:   Analgesic Overuse:     Current Medication Regimen:  - Emgality  - Prozac    Medications Tried: Response  Preventive:  - Topamax    Rescue:  - Sumatriptan  - Zolmitriptan  - Eletriptan  - Rizatriptan  Medications Not Tried:  -     MEDICAL AND SURGICAL HISTORY:  Past Medical History:   Diagnosis Date    Abnormal Pap smear     Anxiety     Anxiety disorder 02/08/2022    medicated    Chickenpox     Daytime sleepiness     Dental disorder 02/08/2022    lower permanent retainer    Depression 10/19/2009    Dizziness     Frequent headaches     Hypertension     10 years ago but resolved    Migraine     Morning headache     Rhinitis     Snoring     Tension headache     Wears glasses      Past Surgical History:   Procedure Laterality Date    [place] HYSTEROSCOPY,W/ENDOMETRIAL ABLATION N/A 01/27/2023    Procedure: ENDOMETRIAL ABLATION DIAGNOSTIC HYSTEROSCOPY;  Surgeon: Tyler Fischer M.D.;  Location: SURGERY SAME DAY [facility];  Service: Gynecology    STEREOTACTIC COMPUTER ASSISTED PROCEDURE CRANIAL, EXTRADURAL N/A  02/10/2022    Procedure: STEREOTACTIC COMPUTER ASSISTED PROCEDURE CRANIAL,EXTRADURAL;  Surgeon: Art Smiley M.D.;  Location: SURGERY SAME DAY [facility];  Service: Ent    ENDOSCOPY, PARANASAL SINUS, WITH TOTAL ETHMOIDECTOMY, SN Right 02/10/2022    Procedure: ENDOSCOPY, PARANASAL SINUS, WITH TOTAL ETHMOIDECTOMY, MAXILLARY ANTROSTOMY, EXPLORATION FRONTAL SINUS;  Surgeon: Art Smiley M.D.;  Location: SURGERY SAME DAY [facility];  Service: Ent    SEPTOPLASTY       MEDICATIONS:  Current Outpatient Medications   Medication Sig    amoxicillin-clavulanate (AUGMENTIN) 875-125 MG Tab Take 1 Tablet by mouth 2 times a day.    Semaglutide (WEGOVY) 0.25 MG/0.5ML Solution Auto-injector Pen-injector Inject 0.5 mL under the skin every 7 days.    Semaglutide (WEGOVY) 0.5 MG/0.5ML Solution Auto-injector Pen-injector Inject 0.5 mL under the skin every 7 days.    EMGALITY 120 MG/ML Solution Auto-injector Inject 1 Syringe under the skin every 30 (thirty) days.    fluoxetine (PROZAC) 40 MG capsule Take 1 Capsule by mouth every day.    pantoprazole (PROTONIX) 40 MG Tablet Delayed Response Take 1 Tablet by mouth every day.     SOCIAL HISTORY:  Social History     Tobacco Use    Smoking status: Never    Smokeless tobacco: Never   Substance Use Topics    Alcohol use: No     Social History     Social History Narrative    Not on file     FAMILY HISTORY:  Family History   Problem Relation Age of Onset    Hyperlipidemia Father     Hypertension Father     No Known Problems Sister     No Known Problems Sister     Other Brother         huntings disease    Feura Bush's disease Brother     No Known Problems Brother     No Known Problems Maternal Grandmother     No Known Problems Maternal Grandfather     No Known Problems Paternal Grandmother     No Known Problems Paternal Grandfather        Vitals ***    REVIEW OF SYSTEMS:  A ROS was completed.  Pertinent positives and negatives were included in the HPI, above.  All other systems were reviewed and  "are negative.    PHYSICAL EXAM:  General/Medical:  - NAD  - hair, skin, nails, and joints were normal    Neuro:  MENTAL STATUS: awake and alert; no deficits of speech or language; oriented to person, place, and time; affect was appropriate to situation    CRANIAL NERVES:    II: acuity: J***/J***, fields: intact to confrontation, pupils: 3/3 to 2/2 without a relative afferent pupillary defect, discs: sharp, no red desaturation noted    III/IV/VI: versions: intact without nystagmus    V: facial sensation: symmetric to light touch    VII: facial expression: symmetric    VIII: hearing: intact to finger rub    IX/X: palate: elevates symmetrically    XI: shoulder shrug: symmetric    XII: tongue: midline    MOTOR:  - bulk: normal throughout  - tone: normal throughout  Upper Extremity Strength  (R/L)    5/5   Elbow flexion 5/5   Elbow extension 5/5   Shoulder abduction 5/5     Lower Extremity Strength  (R/L)   Hip flexion 5/5   Knee extension 5/5   Knee flexion 5/5   Ankle plantarflexion 5/5   Ankle dorsiflexion 5/5     - pronator drift: absent  - abnormal movements: none    SENSATION:  - light touch: ***  - vibration (R/L, seconds): ***/*** at the great toes  - temperature:***  - pinprick: ***  - proprioception: ***  - Romberg: absent    COORDINATION:  - finger to nose: normal, no ataxia on exam  - finger tapping: rapid and accurate, bilaterally  - foot tapping:***    REFLEXES:  Reflex Right Left   BR 2+ 2+   Biceps 2+ 2+   Triceps 2+ 2+   Patellae 2+ 2+   Achilles 2+ 2+   Toes down down     GAIT:  - narrow base and normal  - heel-walk:   - toe-walk:   - tandem gait: intact    REVIEW OF IMAGING STUDIES: I reviewed the following studies:  MRI Brain:  Date: 4/14/15  W/o and w/ contrast?: with and without  Indication: \"headache\"  Comparison: 12/5/13  Impression:  1.  There are a few scattered punctate areas of of increased T2 signal intensity in the periventricular white matter consistent with areas of chronic ischemia, " demyelination, or gliosis.   2.  No change from previous exam..     REVIEW OF LABORATORY STUDIES:  No current pertinent labs    ASSESSMENT& PLAN:

## 2024-06-18 ENCOUNTER — APPOINTMENT (OUTPATIENT)
Dept: NEUROLOGY | Facility: MEDICAL CENTER | Age: 39
End: 2024-06-18
Payer: COMMERCIAL

## 2024-06-24 ENCOUNTER — HOSPITAL ENCOUNTER (OUTPATIENT)
Facility: MEDICAL CENTER | Age: 39
End: 2024-06-24
Attending: PHYSICIAN ASSISTANT
Payer: COMMERCIAL

## 2024-06-24 PROCEDURE — 87624 HPV HI-RISK TYP POOLED RSLT: CPT

## 2024-06-24 PROCEDURE — 88175 CYTOPATH C/V AUTO FLUID REDO: CPT

## 2024-06-28 LAB
CYTOLOGIST CVX/VAG CYTO: NORMAL
CYTOLOGY CVX/VAG DOC CYTO: NORMAL
CYTOLOGY CVX/VAG DOC THIN PREP: NORMAL
HPV I/H RISK 4 DNA CVX QL PROBE+SIG AMP: NEGATIVE
NOTE NL11727A: NORMAL
OTHER STN SPEC: NORMAL
STAT OF ADQ CVX/VAG CYTO-IMP: NORMAL

## 2024-07-12 ENCOUNTER — TELEPHONE (OUTPATIENT)
Dept: NEUROLOGY | Facility: MEDICAL CENTER | Age: 39
End: 2024-07-12

## 2024-07-12 ENCOUNTER — OFFICE VISIT (OUTPATIENT)
Dept: NEUROLOGY | Facility: MEDICAL CENTER | Age: 39
End: 2024-07-12
Payer: COMMERCIAL

## 2024-07-12 VITALS
DIASTOLIC BLOOD PRESSURE: 76 MMHG | OXYGEN SATURATION: 98 % | BODY MASS INDEX: 39.12 KG/M2 | TEMPERATURE: 96.4 F | HEIGHT: 69 IN | WEIGHT: 264.11 LBS | SYSTOLIC BLOOD PRESSURE: 142 MMHG | HEART RATE: 79 BPM

## 2024-07-12 DIAGNOSIS — G43.101 MIGRAINE WITH AURA AND WITH STATUS MIGRAINOSUS, NOT INTRACTABLE: ICD-10-CM

## 2024-07-12 PROCEDURE — 3078F DIAST BP <80 MM HG: CPT

## 2024-07-12 PROCEDURE — 3077F SYST BP >= 140 MM HG: CPT

## 2024-07-12 PROCEDURE — 99212 OFFICE O/P EST SF 10 MIN: CPT

## 2024-07-12 PROCEDURE — 99215 OFFICE O/P EST HI 40 MIN: CPT

## 2024-07-12 RX ORDER — FREMANEZUMAB-VFRM 225 MG/1.5ML
225 INJECTION SUBCUTANEOUS
Qty: 1 EACH | Refills: 11 | Status: SHIPPED | OUTPATIENT
Start: 2024-07-12

## 2024-07-12 RX ORDER — RIMEGEPANT SULFATE 75 MG/75MG
1 TABLET, ORALLY DISINTEGRATING ORAL PRN
Qty: 10 TABLET | Refills: 5 | Status: SHIPPED | OUTPATIENT
Start: 2024-07-12

## 2024-07-12 ASSESSMENT — FIBROSIS 4 INDEX: FIB4 SCORE: 0.54

## 2024-07-17 DIAGNOSIS — G43.101 MIGRAINE WITH AURA AND WITH STATUS MIGRAINOSUS, NOT INTRACTABLE: ICD-10-CM

## 2024-08-02 ENCOUNTER — APPOINTMENT (OUTPATIENT)
Dept: NEUROLOGY | Facility: MEDICAL CENTER | Age: 39
End: 2024-08-02
Payer: COMMERCIAL

## 2024-09-25 ENCOUNTER — IMMUNIZATION (OUTPATIENT)
Dept: OCCUPATIONAL MEDICINE | Facility: CLINIC | Age: 39
End: 2024-09-25

## 2024-09-25 DIAGNOSIS — Z23 NEED FOR VACCINATION: Primary | ICD-10-CM

## 2024-10-01 DIAGNOSIS — G43.101 MIGRAINE WITH AURA AND WITH STATUS MIGRAINOSUS, NOT INTRACTABLE: ICD-10-CM

## 2024-10-01 RX ORDER — GALCANEZUMAB 120 MG/ML
120 INJECTION, SOLUTION SUBCUTANEOUS
Qty: 1 ML | Refills: 5 | Status: SHIPPED | OUTPATIENT
Start: 2024-10-01 | End: 2024-10-15 | Stop reason: SDUPTHER

## 2024-10-01 RX ORDER — GALCANEZUMAB 120 MG/ML
240 INJECTION, SOLUTION SUBCUTANEOUS ONCE
Qty: 2 ML | Refills: 0 | Status: SHIPPED | OUTPATIENT
Start: 2024-10-01 | End: 2024-10-17

## 2024-10-02 ENCOUNTER — TELEPHONE (OUTPATIENT)
Dept: PHARMACY | Facility: MEDICAL CENTER | Age: 39
End: 2024-10-02
Payer: COMMERCIAL

## 2024-10-15 ENCOUNTER — TELEMEDICINE (OUTPATIENT)
Dept: NEUROLOGY | Facility: MEDICAL CENTER | Age: 39
End: 2024-10-15
Payer: COMMERCIAL

## 2024-10-15 ENCOUNTER — APPOINTMENT (OUTPATIENT)
Dept: NEUROLOGY | Facility: MEDICAL CENTER | Age: 39
End: 2024-10-15
Payer: COMMERCIAL

## 2024-10-15 ENCOUNTER — DOCUMENTATION (OUTPATIENT)
Dept: PHARMACY | Facility: MEDICAL CENTER | Age: 39
End: 2024-10-15

## 2024-10-15 VITALS — WEIGHT: 260 LBS | BODY MASS INDEX: 38.51 KG/M2 | HEIGHT: 69 IN

## 2024-10-15 DIAGNOSIS — G43.101 MIGRAINE WITH AURA AND WITH STATUS MIGRAINOSUS, NOT INTRACTABLE: ICD-10-CM

## 2024-10-15 PROCEDURE — 99212 OFFICE O/P EST SF 10 MIN: CPT

## 2024-10-15 PROCEDURE — RXMED WILLOW AMBULATORY MEDICATION CHARGE

## 2024-10-15 RX ORDER — GALCANEZUMAB 120 MG/ML
120 INJECTION, SOLUTION SUBCUTANEOUS
Qty: 1 ML | Refills: 9 | Status: SHIPPED | OUTPATIENT
Start: 2024-10-15

## 2024-10-15 RX ORDER — VERAPAMIL HYDROCHLORIDE 120 MG/1
120 TABLET, FILM COATED, EXTENDED RELEASE ORAL DAILY
Qty: 30 TABLET | Refills: 11 | Status: SHIPPED | OUTPATIENT
Start: 2024-10-15

## 2024-10-15 RX ORDER — RIMEGEPANT SULFATE 75 MG/75MG
1 TABLET, ORALLY DISINTEGRATING ORAL PRN
Qty: 10 TABLET | Refills: 9 | Status: SHIPPED | OUTPATIENT
Start: 2024-10-15

## 2024-10-15 ASSESSMENT — FIBROSIS 4 INDEX: FIB4 SCORE: 0.54

## 2024-10-15 ASSESSMENT — ENCOUNTER SYMPTOMS
DIZZINESS: 1
HEADACHES: 1
DOUBLE VISION: 1

## 2024-10-15 ASSESSMENT — PATIENT HEALTH QUESTIONNAIRE - PHQ9: CLINICAL INTERPRETATION OF PHQ2 SCORE: 0

## 2024-10-16 ENCOUNTER — PHARMACY VISIT (OUTPATIENT)
Dept: PHARMACY | Facility: MEDICAL CENTER | Age: 39
End: 2024-10-16
Payer: COMMERCIAL

## 2024-10-17 ENCOUNTER — TELEPHONE (OUTPATIENT)
Dept: PHARMACY | Facility: MEDICAL CENTER | Age: 39
End: 2024-10-17
Payer: COMMERCIAL

## 2024-10-22 DIAGNOSIS — F32.A DEPRESSION, UNSPECIFIED DEPRESSION TYPE: ICD-10-CM

## 2024-10-22 RX ORDER — FLUOXETINE 40 MG/1
40 CAPSULE ORAL DAILY
Qty: 90 CAPSULE | Refills: 0 | Status: SHIPPED | OUTPATIENT
Start: 2024-10-22

## 2024-11-06 ENCOUNTER — HOSPITAL ENCOUNTER (OUTPATIENT)
Facility: MEDICAL CENTER | Age: 39
End: 2024-11-06
Attending: NURSE PRACTITIONER
Payer: COMMERCIAL

## 2024-11-06 ENCOUNTER — HOSPITAL ENCOUNTER (OUTPATIENT)
Dept: LAB | Facility: MEDICAL CENTER | Age: 39
End: 2024-11-06
Attending: PHYSICIAN ASSISTANT
Payer: COMMERCIAL

## 2024-11-06 LAB
EST. AVERAGE GLUCOSE BLD GHB EST-MCNC: 108 MG/DL
HBA1C MFR BLD: 5.4 % (ref 4–5.6)
HCG SERPL QL: NEGATIVE
T4 FREE SERPL-MCNC: 1.07 NG/DL (ref 0.93–1.7)
TSH SERPL-ACNC: 2.51 UIU/ML (ref 0.35–5.5)

## 2024-11-06 PROCEDURE — 80061 LIPID PANEL: CPT

## 2024-11-06 PROCEDURE — 84703 CHORIONIC GONADOTROPIN ASSAY: CPT

## 2024-11-06 PROCEDURE — 80053 COMPREHEN METABOLIC PANEL: CPT

## 2024-11-06 PROCEDURE — 84439 ASSAY OF FREE THYROXINE: CPT

## 2024-11-06 PROCEDURE — 83690 ASSAY OF LIPASE: CPT

## 2024-11-06 PROCEDURE — 84443 ASSAY THYROID STIM HORMONE: CPT

## 2024-11-06 PROCEDURE — 83036 HEMOGLOBIN GLYCOSYLATED A1C: CPT

## 2024-11-06 PROCEDURE — 36415 COLL VENOUS BLD VENIPUNCTURE: CPT

## 2024-11-06 PROCEDURE — 82607 VITAMIN B-12: CPT

## 2024-11-07 LAB
ALBUMIN SERPL BCP-MCNC: 3.8 G/DL (ref 3.2–4.9)
ALBUMIN/GLOB SERPL: 1.2 G/DL
ALP SERPL-CCNC: 111 U/L (ref 30–99)
ALT SERPL-CCNC: 17 U/L (ref 2–50)
ANION GAP SERPL CALC-SCNC: 9 MMOL/L (ref 7–16)
AST SERPL-CCNC: 21 U/L (ref 12–45)
BILIRUB SERPL-MCNC: 0.6 MG/DL (ref 0.1–1.5)
BUN SERPL-MCNC: 14 MG/DL (ref 8–22)
CALCIUM ALBUM COR SERPL-MCNC: 9 MG/DL (ref 8.5–10.5)
CALCIUM SERPL-MCNC: 8.8 MG/DL (ref 8.5–10.5)
CHLORIDE SERPL-SCNC: 105 MMOL/L (ref 96–112)
CHOLEST SERPL-MCNC: 194 MG/DL (ref 100–199)
CO2 SERPL-SCNC: 23 MMOL/L (ref 20–33)
CREAT SERPL-MCNC: 0.82 MG/DL (ref 0.5–1.4)
GFR SERPLBLD CREATININE-BSD FMLA CKD-EPI: 94 ML/MIN/1.73 M 2
GLOBULIN SER CALC-MCNC: 3.2 G/DL (ref 1.9–3.5)
GLUCOSE SERPL-MCNC: 88 MG/DL (ref 65–99)
HDLC SERPL-MCNC: 51 MG/DL
LDLC SERPL CALC-MCNC: 127 MG/DL
LIPASE SERPL-CCNC: 31 U/L (ref 11–82)
POTASSIUM SERPL-SCNC: 4.3 MMOL/L (ref 3.6–5.5)
PROT SERPL-MCNC: 7 G/DL (ref 6–8.2)
SODIUM SERPL-SCNC: 137 MMOL/L (ref 135–145)
TRIGL SERPL-MCNC: 82 MG/DL (ref 0–149)
VIT B12 SERPL-MCNC: 707 PG/ML (ref 211–911)

## 2024-11-14 DIAGNOSIS — G43.101 MIGRAINE WITH AURA AND WITH STATUS MIGRAINOSUS, NOT INTRACTABLE: ICD-10-CM

## 2024-11-19 ENCOUNTER — TELEPHONE (OUTPATIENT)
Dept: PHARMACY | Facility: MEDICAL CENTER | Age: 39
End: 2024-11-19
Payer: COMMERCIAL

## 2024-11-19 DIAGNOSIS — G43.101 MIGRAINE WITH AURA AND WITH STATUS MIGRAINOSUS, NOT INTRACTABLE: ICD-10-CM

## 2024-11-19 RX ORDER — GALCANEZUMAB 120 MG/ML
240 INJECTION, SOLUTION SUBCUTANEOUS ONCE
Qty: 2 ML | Refills: 0 | OUTPATIENT
Start: 2024-11-19 | End: 2024-11-19

## 2024-11-19 RX ORDER — GALCANEZUMAB 120 MG/ML
120 INJECTION, SOLUTION SUBCUTANEOUS
Qty: 1 ML | Refills: 9 | Status: SHIPPED | OUTPATIENT
Start: 2024-11-19 | End: 2024-11-26

## 2024-11-19 NOTE — TELEPHONE ENCOUNTER
Received New Start  request via MSOT  for (EMGALITY) 120 MG/ML Solution Auto-injector. (Quantity:1, Day Supply:28)     Insurance: [employer] 2  Member ID:  [id number]  BIN: [id number]  PCN: [facility]  Group: [id number]     Ran Test claim via [place] & medication  Ins does not cover. Pt uses [employer] copay card.

## 2024-11-19 NOTE — TELEPHONE ENCOUNTER
Please set pt up for virtual visit in 3mo Received request via: Pharmacy    Medication Name/Dosage Galcanezumab-gnlm (EMGALITY) 120 MG/ML Solution Auto-injector     When was medication last prescribed 10/15/2024    How many refills were previously provided 0    How many Refills does he patient have left from last prescription 0    Was the patient seen in the last year in this department? Yes   Date of last office visit 10/15/2024     Per last Neurology Office Visit, when was the date of next follow up visit set for?                            Date of office visit follow up request 9 months     Does the patient have an upcoming appointment? No   If yes, when none             If no, schedule appointment Left Voicemail    Does the patient have [facility] Plus and need 100 day supply (blood pressure, diabetes and cholesterol meds only)? Patient does not have SCP  Requested Renewals     Galcanezumab-gnlm (EMGALITY) 120 MG/ML Solution Auto-injector         Sig: Inject 2 mL under the skin one time for 1 dose.    Disp: 2 mL    Refills: 0    Start: 11/19/2024 - 11/19/2024    Class: Normal    Non-formulary For: Migraine with aura and with status migrainosus, not intractable    To pharmacy: Put on hold for now    Last ordered: 1 month ago (10/1/2024) by APURVA [email or web address]    Last dispensed: 10/16/2024    Rx #: [id number]    Pharmacy comment: Can we get more refills for pt?       To be filled at: [facility]

## 2024-11-25 ENCOUNTER — PATIENT MESSAGE (OUTPATIENT)
Dept: NEUROLOGY | Facility: MEDICAL CENTER | Age: 39
End: 2024-11-25
Payer: COMMERCIAL

## 2024-11-25 NOTE — PATIENT COMMUNICATION
Please see below and let me know if you need anything from me     Shanell Hauser. So [facility] is being silly and won’t approve the Emgality unless I fail 12 weeks of the amovig. Can we get a script for that so that we can play this game? I believe I did the aiovy. Now they want the amovig I believe

## 2024-11-26 DIAGNOSIS — G43.101 MIGRAINE WITH AURA AND WITH STATUS MIGRAINOSUS, NOT INTRACTABLE: ICD-10-CM

## 2024-11-26 RX ORDER — ERENUMAB-AOOE 140 MG/ML
1 INJECTION, SOLUTION SUBCUTANEOUS
Qty: 1 EACH | Refills: 11 | Status: SHIPPED | OUTPATIENT
Start: 2024-11-26

## 2024-11-27 ENCOUNTER — TELEPHONE (OUTPATIENT)
Dept: PHARMACY | Facility: MEDICAL CENTER | Age: 39
End: 2024-11-27
Payer: COMMERCIAL

## 2024-11-27 NOTE — TELEPHONE ENCOUNTER
Received New Start PA request via MSOT  for   Erenumab-aooe (AIMOVIG) 140 MG/ML Solution Auto-injector . (Quantity:1, Day Supply:28)     Insurance: [employer] 2  Member ID:  [id number]  BIN: [id number]  PCN: [facility]  Group: [id number]     Ran Test claim via [place] & medication Rejects stating prior authorization is required.

## 2024-11-27 NOTE — TELEPHONE ENCOUNTER
Prior Authorization for Erenumab-aooe (AIMOVIG) 140 MG/ML Solution Auto-injector  has been approved for a quantity of 1 , day supply 28    Prior Authorization reference number: [id number]  Insurance: [employer] 2  Effective dates: 11/26/24 to 5/25/25  Copay: $25     Is patient eligible to fill with [facility]? Yes    Next Steps: The patient's copay exceeds $5.00. Proceed with contacting patient to offer financial assistance.

## 2024-11-27 NOTE — TELEPHONE ENCOUNTER
Prior Authorization for Erenumab-aooe (AIMOVIG) 140 MG/ML Solution Auto-injector  (Quantity: 1, Days: 28) has been submitted via  [id number]    Insurance: [employer] 2    Will follow up in 24-48 business hours.

## 2024-12-02 ENCOUNTER — DOCUMENTATION (OUTPATIENT)
Dept: PHARMACY | Facility: MEDICAL CENTER | Age: 39
End: 2024-12-02
Payer: COMMERCIAL

## 2024-12-02 PROCEDURE — RXMED WILLOW AMBULATORY MEDICATION CHARGE

## 2024-12-02 NOTE — PROGRESS NOTES
PHARMACIST CLINICAL ONBOARDING - Abbreviated Onboarding -   Result = Complete, Next card status: New Start    Therapeutic Category: Neurology - Migraine  ICD10: G43.101  Diagnosis Details: Migraine with aura and with status migrainosus, not intractable [G43.101]  Medication(s) associated with Therapeutic Category: Aimovig Solution Auto-injector 140 MG/ML  Medication(s) prescribed for FDA approved indication?   -Aimovig Solution Auto-injector 140 MG/ML: Yes      Med not actively followed by specialty pharmacists team due to low risk. We will assist as needed for any questions or concerns.

## 2024-12-03 ENCOUNTER — PHARMACY VISIT (OUTPATIENT)
Dept: PHARMACY | Facility: MEDICAL CENTER | Age: 39
End: 2024-12-03
Payer: COMMERCIAL

## 2024-12-09 NOTE — PATIENT COMMUNICATION
Please see below     Shanell Hauser. I spoke to [facility] and was told that Emgality is covered if it’s the 100mg one? Is that a big difference then what I was taking? Can we get a prior auth submitted for that?

## 2024-12-30 PROCEDURE — RXMED WILLOW AMBULATORY MEDICATION CHARGE

## 2025-01-02 ENCOUNTER — PHARMACY VISIT (OUTPATIENT)
Dept: PHARMACY | Facility: MEDICAL CENTER | Age: 40
End: 2025-01-02
Payer: COMMERCIAL

## 2025-01-02 NOTE — ASSESSMENT & PLAN NOTE
Acute condition.  Patient states that she has had abnormal vaginal bleeding for eight months with clotting after her periods. Her periods are heavier than normal. Her period stops and then the clots begin four to five days later, and those last about 1 to 2 days.  She was supposed to have a transvaginal ultrasound with her GYN provider, but the order did not get placed.  She reports she does have pain with these episodes.  Discussed with patient that I will order the ultrasound and have her follow up with her GYN provider for review.  She is in agreement with this plan.   
Acute condition. Patient reports that she has had a sinus infection for over five weeks. She was seen in urgent care on 11/1/2021 and given a course of antibiotics.  She is still symptomatic with sinus pain, pressure and headaches despite the course of antibiotics. She does have sinus drainage. She denies fever or chills.  She reports significant fatigue associated with this infection.   
No

## 2025-01-07 DIAGNOSIS — F32.A DEPRESSION, UNSPECIFIED DEPRESSION TYPE: ICD-10-CM

## 2025-01-07 RX ORDER — PANTOPRAZOLE SODIUM 40 MG/1
40 TABLET, DELAYED RELEASE ORAL 2 TIMES DAILY
Qty: 180 TABLET | Refills: 1 | Status: SHIPPED | OUTPATIENT
Start: 2025-01-07

## 2025-01-10 RX ORDER — FLUOXETINE 40 MG/1
40 CAPSULE ORAL DAILY
Qty: 90 CAPSULE | Refills: 0 | Status: SHIPPED | OUTPATIENT
Start: 2025-01-10

## 2025-01-10 NOTE — TELEPHONE ENCOUNTER
Received request via: Patient    Was the patient seen in the last year in this department? Yes    Does the patient have an active prescription (recently filled or refills available) for medication(s) requested? No    Pharmacy Name:   [facility] [address] [place] [id number]  Phone: [phone]  Fax: [phone]       Does the patient have intermediate Plus and need 100-day supply? (This applies to ALL medications) Patient does not have SCP

## 2025-01-22 DIAGNOSIS — G43.101 MIGRAINE WITH AURA AND WITH STATUS MIGRAINOSUS, NOT INTRACTABLE: ICD-10-CM

## 2025-01-22 RX ORDER — GALCANEZUMAB 120 MG/ML
120 INJECTION, SOLUTION SUBCUTANEOUS
Qty: 1 EACH | Refills: 5 | Status: SHIPPED | OUTPATIENT
Start: 2025-01-22

## 2025-01-22 RX ORDER — GALCANEZUMAB 120 MG/ML
240 INJECTION, SOLUTION SUBCUTANEOUS ONCE
Qty: 2 EACH | Refills: 0 | Status: SHIPPED | OUTPATIENT
Start: 2025-01-22 | End: 2025-01-22

## 2025-01-30 ENCOUNTER — TELEPHONE (OUTPATIENT)
Dept: PHARMACY | Facility: MEDICAL CENTER | Age: 40
End: 2025-01-30
Payer: COMMERCIAL

## 2025-01-31 NOTE — TELEPHONE ENCOUNTER
Prior Authorization for (EMGALITY) 120 MG/ML Solution Auto-injector  (Quantity: 2, Days: 30) has been submitted via Cover My Meds: Key ([id number])    Insurance: [employer] 2    Will follow up in 24-48 business hours.

## 2025-01-31 NOTE — TELEPHONE ENCOUNTER
Received New Start PA request via MSOT  for  (EMGALITY) 120 MG/ML Solution Auto-injector . (Quantity:2, Day Supply:30)     Insurance: [facility] 2  Member ID:  [id number]  BIN: [id number]  PCN: [facility]  Group: [id number]     Ran Test claim via [place] & medication Rejects stating prior authorization is required.

## 2025-02-03 ENCOUNTER — DOCUMENTATION (OUTPATIENT)
Dept: PHARMACY | Facility: MEDICAL CENTER | Age: 40
End: 2025-02-03
Payer: COMMERCIAL

## 2025-02-03 PROCEDURE — RXMED WILLOW AMBULATORY MEDICATION CHARGE

## 2025-02-03 NOTE — PROGRESS NOTES
PHARMACIST CLINICAL ONBOARDING - Abbreviated Onboarding -   Result = Complete, Next card status: Transfer/Restart    Therapeutic Category: Neurology - Migraine  ICD10: R61989  Diagnosis Details: Migraine with aura and with status migrainosus, not intractable [G43.101]  Medication(s) associated with Therapeutic Category: Emgality Solution Auto-injector 120 MG/ML  Medication(s) prescribed for FDA approved indication?   -Emgality Solution Auto-injector 120 MG/ML: Yes      Med not actively followed by specialty pharmacists team due to low risk. We will assist as needed for any questions or concerns.

## 2025-02-04 ENCOUNTER — PHARMACY VISIT (OUTPATIENT)
Dept: PHARMACY | Facility: MEDICAL CENTER | Age: 40
End: 2025-02-04
Payer: COMMERCIAL

## 2025-02-14 DIAGNOSIS — G43.101 MIGRAINE WITH AURA AND WITH STATUS MIGRAINOSUS, NOT INTRACTABLE: ICD-10-CM

## 2025-02-18 ENCOUNTER — TELEPHONE (OUTPATIENT)
Dept: PHARMACY | Facility: MEDICAL CENTER | Age: 40
End: 2025-02-18
Payer: COMMERCIAL

## 2025-02-18 RX ORDER — SUMATRIPTAN SUCCINATE 100 MG/1
100 TABLET ORAL
Qty: 10 TABLET | Refills: 3 | Status: SHIPPED | OUTPATIENT
Start: 2025-02-18

## 2025-02-18 NOTE — TELEPHONE ENCOUNTER
Received New Start  request via [facility]  for   sumatriptan (IMITREX) 100 MG tablet . (Quantity:9, Day Supply:30)     Insurance: [facility] 2  Member ID:  [id number]  BIN: [id number]  PCN: [facility]  Group: [id number]     Ran Test claim via [place] & medication Pays for a $7.59 copay. Will outreach to patient to offer specialty pharmacy services and or release to preferred pharmacy    Pt already fills at the [facility] pharmacy.

## 2025-02-26 PROCEDURE — RXMED WILLOW AMBULATORY MEDICATION CHARGE

## 2025-03-03 ENCOUNTER — PHARMACY VISIT (OUTPATIENT)
Dept: PHARMACY | Facility: MEDICAL CENTER | Age: 40
End: 2025-03-03
Payer: COMMERCIAL

## 2025-03-25 ENCOUNTER — TELEPHONE (OUTPATIENT)
Dept: MEDICAL GROUP | Facility: PHYSICIAN GROUP | Age: 40
End: 2025-03-25
Payer: COMMERCIAL

## 2025-03-25 NOTE — TELEPHONE ENCOUNTER
DOCUMENTATION OF PAR STATUS:    1. Name of Medication & Dose: pantoprazole     2. Name of Prescription Coverage Company & phone #: [facility] [employer]    3. Date Prior Auth Submitted: 03/25/25    4. What information was given to obtain insurance decision? Cover My Meds    5. Prior Auth Status? Pending    6. Patient Notified: yes

## 2025-03-26 DIAGNOSIS — F32.A DEPRESSION, UNSPECIFIED DEPRESSION TYPE: ICD-10-CM

## 2025-03-26 PROCEDURE — RXMED WILLOW AMBULATORY MEDICATION CHARGE

## 2025-03-27 ENCOUNTER — PHARMACY VISIT (OUTPATIENT)
Dept: PHARMACY | Facility: MEDICAL CENTER | Age: 40
End: 2025-03-27
Payer: COMMERCIAL

## 2025-03-27 RX ORDER — PANTOPRAZOLE SODIUM 40 MG/1
40 TABLET, DELAYED RELEASE ORAL DAILY
Qty: 90 TABLET | Refills: 3 | Status: SHIPPED | OUTPATIENT
Start: 2025-03-27

## 2025-03-27 RX ORDER — FLUOXETINE HYDROCHLORIDE 40 MG/1
40 CAPSULE ORAL DAILY
Qty: 90 CAPSULE | Refills: 0 | Status: SHIPPED | OUTPATIENT
Start: 2025-03-27

## 2025-03-27 NOTE — TELEPHONE ENCOUNTER
FINAL PRIOR AUTHORIZATION STATUS:     1.  Name of Medication & Dose: pantoprazole      2. Prior Auth Status: Denied.  Reason: insurance will only pay for 1 pill per day     3. Action Taken: Pharmacy Notified: no Patient Notified: no

## 2025-03-27 NOTE — TELEPHONE ENCOUNTER
Received request via: Pharmacy    Was the patient seen in the last year in this department? Yes    Does the patient have an active prescription (recently filled or refills available) for medication(s) requested? No    Pharmacy Name:   [facility] [place] - [address] [place] [address]  [address] [place] [id number]  Phone: [phone] Fax: [phone]        Does the patient have USP Plus and need 100-day supply? (This applies to ALL medications)no

## 2025-04-24 PROCEDURE — RXMED WILLOW AMBULATORY MEDICATION CHARGE

## 2025-04-28 ENCOUNTER — PHARMACY VISIT (OUTPATIENT)
Dept: PHARMACY | Facility: MEDICAL CENTER | Age: 40
End: 2025-04-28
Payer: COMMERCIAL

## 2025-05-02 ENCOUNTER — TELEPHONE (OUTPATIENT)
Dept: PHARMACY | Facility: MEDICAL CENTER | Age: 40
End: 2025-05-02
Payer: COMMERCIAL

## 2025-05-02 NOTE — TELEPHONE ENCOUNTER
Received Renewal PA request via [facility]  for  [facility] 75 MG TABLET DISPERSIBLE. (Quantity:10, Day Supply:30)     Insurance: [facility] 2  Member ID:  [id number]  BIN: [id number]  PCN: [facility]  Group: [id number]     Ran Test claim via [place] & medication Rejects stating prior authorization is required.

## 2025-05-02 NOTE — TELEPHONE ENCOUNTER
Prior Authorization for (NURTEC) 75 MG TABLET DISPERSIBLE  has been approved for a quantity of 10 , day supply 30    Prior Authorization reference number: [id number]  Insurance: [employer] 2  Effective dates: 05/02/25 to 5/2/27  Copay: $0     Is patient eligible to fill with [facility]? Yes    Next Steps: The Patients copay is less than $5.00. Will contact the patient to determine choice of pharmacy, if applicable.

## 2025-05-02 NOTE — TELEPHONE ENCOUNTER
Prior Authorization for (NURTEC) 75 MG TABLET DISPERSIBLE  (Quantity: 10, Days: 30) has been submitted via Cover My Meds: Key ([id number])    Insurance: [employer] 2    Will follow up in 24-48 business hours.

## 2025-05-22 PROCEDURE — RXMED WILLOW AMBULATORY MEDICATION CHARGE

## 2025-05-27 ENCOUNTER — PHARMACY VISIT (OUTPATIENT)
Dept: PHARMACY | Facility: MEDICAL CENTER | Age: 40
End: 2025-05-27
Payer: COMMERCIAL

## 2025-06-11 DIAGNOSIS — F32.A DEPRESSION, UNSPECIFIED DEPRESSION TYPE: ICD-10-CM

## 2025-06-11 RX ORDER — FLUOXETINE HYDROCHLORIDE 40 MG/1
40 CAPSULE ORAL DAILY
Qty: 60 CAPSULE | Refills: 0 | Status: SHIPPED | OUTPATIENT
Start: 2025-06-11

## 2025-06-12 NOTE — TELEPHONE ENCOUNTER
Received request via: Pharmacy    Was the patient seen in the last year in this department? Yes    Does the patient have an active prescription (recently filled or refills available) for medication(s) requested? No    Pharmacy Name:   [facility] [address] [place], [place] - [address] [facility]  [address] [facility] [address]  Phone: [phone] Fax: [phone]    [facility] [address] [facility] [address] [address]  Phone: [phone] Fax: [phone]    Does the patient have long-term Plus and need 100-day supply? (This applies to ALL medications) Patient does not have SCP

## 2025-06-19 ENCOUNTER — PATIENT MESSAGE (OUTPATIENT)
Dept: PHARMACY | Facility: MEDICAL CENTER | Age: 40
End: 2025-06-19
Payer: COMMERCIAL

## 2025-06-20 PROCEDURE — RXMED WILLOW AMBULATORY MEDICATION CHARGE

## 2025-06-23 ENCOUNTER — PHARMACY VISIT (OUTPATIENT)
Dept: PHARMACY | Facility: MEDICAL CENTER | Age: 40
End: 2025-06-23
Payer: COMMERCIAL

## 2025-07-11 ENCOUNTER — OFFICE VISIT (OUTPATIENT)
Dept: URGENT CARE | Facility: PHYSICIAN GROUP | Age: 40
End: 2025-07-11
Payer: COMMERCIAL

## 2025-07-11 VITALS
HEART RATE: 77 BPM | TEMPERATURE: 97.1 F | SYSTOLIC BLOOD PRESSURE: 114 MMHG | RESPIRATION RATE: 14 BRPM | OXYGEN SATURATION: 98 % | WEIGHT: 218 LBS | DIASTOLIC BLOOD PRESSURE: 82 MMHG | BODY MASS INDEX: 32.29 KG/M2 | HEIGHT: 69 IN

## 2025-07-11 DIAGNOSIS — G43.009 MIGRAINE WITHOUT AURA AND WITHOUT STATUS MIGRAINOSUS, NOT INTRACTABLE: Chronic | ICD-10-CM

## 2025-07-11 DIAGNOSIS — H61.21 IMPACTED CERUMEN OF RIGHT EAR: Primary | ICD-10-CM

## 2025-07-11 PROCEDURE — 3079F DIAST BP 80-89 MM HG: CPT | Performed by: FAMILY MEDICINE

## 2025-07-11 PROCEDURE — 69210 REMOVE IMPACTED EAR WAX UNI: CPT | Performed by: FAMILY MEDICINE

## 2025-07-11 PROCEDURE — 3074F SYST BP LT 130 MM HG: CPT | Performed by: FAMILY MEDICINE

## 2025-07-11 PROCEDURE — 99213 OFFICE O/P EST LOW 20 MIN: CPT | Mod: 25 | Performed by: FAMILY MEDICINE

## 2025-07-11 RX ORDER — ONDANSETRON 8 MG/1
TABLET, ORALLY DISINTEGRATING ORAL
COMMUNITY
Start: 2025-06-11

## 2025-07-11 ASSESSMENT — FIBROSIS 4 INDEX: FIB4 SCORE: 0.71

## 2025-07-11 NOTE — PROGRESS NOTES
Subjective:      Chief Complaint   Patient presents with    Otalgia    Headache               Otalgia - rt  This is a new problem. The current episode started in the past 2 days. The problem occurs constantly. The problem has been unchanged. Associated symptoms include rt sided headache.         Pertinent negatives include no abdominal pain, chest pain, chills, fever,  , joint swelling, myalgias, nausea, neck pain, rash or visual change. Nothing aggravates the symptoms. She has tried nothing for the symptoms.     Social History[1]      Medications Ordered Prior to Encounter[2]      Past Medical History:   Diagnosis Date    Dental disorder 02/08/2022    lower permanent retainer    Anxiety disorder 02/08/2022    medicated    Depression 10/19/2009    Abnormal Pap smear     Anxiety     Chickenpox     Daytime sleepiness     Dizziness     Frequent headaches     Hypertension     10 years ago but resolved    Migraine     Morning headache     Rhinitis     Snoring     Tension headache     Wears glasses          Family History   Problem Relation Age of Onset    Hyperlipidemia Father     Hypertension Father     No Known Problems Sister     No Known Problems Sister     Other Brother         huntings disease    [place]'s disease Brother     No Known Problems Brother     No Known Problems Maternal Grandmother     No Known Problems Maternal Grandfather     No Known Problems Paternal Grandmother     No Known Problems Paternal Grandfather           Review of Systems   Constitutional: +fatigue  HENT: Positive for  ear pain. Negative for hearing loss and tinnitus.    Respiratory:   Negative for hemoptysis, shortness of breath and wheezing.    Cardiovascular: Negative for chest pain, palpitations and leg swelling.   Gastrointestinal: Negative for nausea and abdominal pain.   Musculoskeletal: Negative for myalgias, joint swelling and neck pain.   Skin: Negative for rash.   Neurological: + for headaches.   All other systems reviewed  "and are negative.         Objective:     /82   Pulse 77   Temp 36.2 °C (97.1 °F) (Temporal)   Resp 14   Ht 1.753 m (5' 9\")   Wt 98.9 kg (218 lb)   SpO2 98%     Physical Exam   Constitutional: Vital signs are normal.  No distress.   HENT:   Head: There is normal jaw occlusion.   Right Ear: External ear normal.   Canal with impacted cerumen.   After removal:   Tympanic membrane is normal. No middle ear effusion.      Nose:   No nasal discharge.   Mouth/Throat: Mucous membranes are moist. No oral lesions.   No oropharyngeal exudate, pharynx swelling or pharynx petechiae. . No   exudate.   Eyes: Conjunctivae and EOM are normal. Pupils are equal, round, and reactive to light. Right eye exhibits no discharge. Left eye exhibits no discharge.   Neck: Normal range of motion. Neck supple. .   Cardiovascular: Normal rate and regular rhythm.  Pulses are palpable.    No murmur heard.  Pulmonary/Chest: Effort normal and breath sounds normal. There is normal air entry. No respiratory distress. no wheezes, rhonchi,  retraction.   Musculoskeletal:   no edema.   Neurological: A/O x 3.   CN 2-12 intact   Skin: Skin is warm. Capillary refill takes less than 3 seconds. No purpura and no rash noted. Patient is not diaphoretic. No jaundice or pallor.   Nursing note and vitals reviewed.              Assessment/Plan:      1. Impacted cerumen of right ear (Primary)     ear canal was impacted with cerumen. Ear lavage was performed with normal saline, afterward impacted cerumen was removed with speculum. Subsequent to this, tympanic membrane was visualized.    Pt reports improved ear pain.     1. Impacted cerumen of right ear (Primary)       2. Migraine without aura and without status migrainosus, not intractable   Pt has chronic hx  Suspect migraine triggered by the ear pain/congestion.     Expect resolution soon - advised to RTC if headache is persistent.     Differential diagnosis, natural history, supportive care, and " indications for immediate follow-up discussed. All questions answered. Patient agrees with the plan of care.     Follow-up as needed if symptoms worsen or fail to improve to PCP, Urgent care or Emergency Room.     I have personally reviewed prior external notes and test results pertinent to today's visit.  I have independently reviewed and interpreted all diagnostics ordered during this urgent care acute visit.                [1]   Social History  Tobacco Use    Smoking status: Never    Smokeless tobacco: Never   Vaping Use    Vaping status: Never Used   Substance Use Topics    Alcohol use: No    Drug use: Yes     Frequency: 7.0 times per week     Types: Marijuana, Oral     Comment: marijuana for anxiety   [2]   Current Outpatient Medications on File Prior to Visit   Medication Sig Dispense Refill    fluoxetine (PROZAC) 40 MG capsule TAKE 1 CAPSULE BY MOUTH ONCE DAILY . APPOINTMENT REQUIRED FOR FUTURE REFILLS 60 Capsule 0    pantoprazole (PROTONIX) 40 MG Tablet Delayed Response Take 1 Tablet by mouth every day. 90 Tablet 3    sumatriptan (IMITREX) 100 MG tablet Take 1 Tablet by mouth one time as needed for Migraine for up to 1 dose. Can re-dose in 2 hours if no resolution of migraine. Max dose is 2 in 24 hours 10 Tablet 3    Galcanezumab-gnlm (EMGALITY) 120 MG/ML Solution Auto-injector Inject 1 mL under the skin every 4 weeks. 1 mL 5    Rimegepant Sulfate (NURTEC) 75 MG TABLET DISPERSIBLE Take 1 Tablet by mouth as needed (migraine). Take at headache onset. Repeat once in 24 hours 10 Tablet 9    ondansetron (ZOFRAN ODT) 8 MG TABLET DISPERSIBLE DISSOLVE 1 TABLET IN MOUTH EVERY 8 HOURS AS NEEDED FOR NAUSEA (Patient not taking: Reported on 7/11/2025)      verapamil SR (CALAN SR) 120 MG CR tablet Take 1 Tablet by mouth every day. (Patient not taking: Reported on 7/11/2025) 30 Tablet 11    Semaglutide (WEGOVY) 0.25 MG/0.5ML Solution Auto-injector Pen-injector Inject 0.5 mL under the skin every 7 days. (Patient not taking:  Reported on 7/11/2025) 4 Each 0    Semaglutide (WEGOVY) 0.5 MG/0.5ML Solution Auto-injector Pen-injector Inject 0.5 mL under the skin every 7 days. (Patient not taking: Reported on 7/11/2025) 12 Each 3     No current facility-administered medications on file prior to visit.

## 2025-07-16 ENCOUNTER — PATIENT MESSAGE (OUTPATIENT)
Dept: PHARMACY | Facility: MEDICAL CENTER | Age: 40
End: 2025-07-16
Payer: COMMERCIAL

## 2025-07-18 PROCEDURE — RXMED WILLOW AMBULATORY MEDICATION CHARGE

## 2025-07-21 ENCOUNTER — PHARMACY VISIT (OUTPATIENT)
Dept: PHARMACY | Facility: MEDICAL CENTER | Age: 40
End: 2025-07-21
Payer: COMMERCIAL

## 2025-08-15 ENCOUNTER — SPECIALTY PHARMACY (OUTPATIENT)
Dept: NEUROLOGY | Facility: MEDICAL CENTER | Age: 40
End: 2025-08-15
Payer: COMMERCIAL

## 2025-08-15 DIAGNOSIS — G43.101 MIGRAINE WITH AURA AND WITH STATUS MIGRAINOSUS, NOT INTRACTABLE: ICD-10-CM

## 2025-08-15 PROCEDURE — RXMED WILLOW AMBULATORY MEDICATION CHARGE

## 2025-08-19 ENCOUNTER — PHARMACY VISIT (OUTPATIENT)
Dept: PHARMACY | Facility: MEDICAL CENTER | Age: 40
End: 2025-08-19
Payer: COMMERCIAL

## 2025-08-19 ENCOUNTER — SPECIALTY PHARMACY (OUTPATIENT)
Dept: NEUROLOGY | Facility: MEDICAL CENTER | Age: 40
End: 2025-08-19
Payer: COMMERCIAL

## 2025-08-19 RX ORDER — GALCANEZUMAB 120 MG/ML
120 INJECTION, SOLUTION SUBCUTANEOUS
Qty: 1 ML | Refills: 5 | Status: SHIPPED | OUTPATIENT
Start: 2025-08-19

## 2025-08-20 PROCEDURE — RXMED WILLOW AMBULATORY MEDICATION CHARGE: Performed by: PSYCHIATRY & NEUROLOGY

## 2025-08-21 ENCOUNTER — PHARMACY VISIT (OUTPATIENT)
Dept: PHARMACY | Facility: MEDICAL CENTER | Age: 40
End: 2025-08-21
Payer: COMMERCIAL

## (undated) DEVICE — WATER IRRIGATION STERILE 1000ML (12EA/CA)

## (undated) DEVICE — TUBE CONNECT SUCTION CLEAR 120 X 1/4" (50EA/CA)"

## (undated) DEVICE — GLOVE BIOGEL SZ 7.5 SURGICAL PF LTX - (50PR/BX 4BX/CA)

## (undated) DEVICE — MASK ANESTHESIA ADULT  - (100/CA)

## (undated) DEVICE — GLOVE BIOGEL SZ 8.5 SURGICAL PF LTX - (50PR/BX 4BX/CA)

## (undated) DEVICE — TUBE CONNECTING SUCTION - CLEAR PLASTIC STERILE 72 IN (50EA/CA)

## (undated) DEVICE — GLOVE BIOGEL PI INDICATOR SZ 7.0 SURGICAL PF LF - (50/BX 4BX/CA)

## (undated) DEVICE — KIT  I.V. START (100EA/CA)

## (undated) DEVICE — SLEEVE VASO CALF MED - (10PR/CA)

## (undated) DEVICE — CANISTER SUCTION 3000ML MECHANICAL FILTER AUTO SHUTOFF MEDI-VAC NONSTERILE LF DISP  (40EA/CA)

## (undated) DEVICE — NEEDLE NON SAFETY HYPO 22 GA X 1 1/2 IN (100/BX)

## (undated) DEVICE — NEPTUNE 4 PORT MANIFOLD - (20/PK)

## (undated) DEVICE — TUBING ENDOSCRUB II (5EA/PK)

## (undated) DEVICE — TUBING CLEARLINK DUO-VENT - C-FLO (48EA/CA)

## (undated) DEVICE — SUCTION INSTRUMENT YANKAUER BULBOUS TIP W/O VENT (50EA/CA)

## (undated) DEVICE — TOWEL STOP TIMEOUT SAFETY FLAG (40EA/CA)

## (undated) DEVICE — ELECTRODE 850 FOAM ADHESIVE - HYDROGEL RADIOTRNSPRNT (50/PK)

## (undated) DEVICE — SUTURE 4-0 PLAIN GUT SC-1 ETHICON (36PK/BX)

## (undated) DEVICE — NEEDLE SPINAL NON-SAFETY 25GA X 3 (25EA/BX)"

## (undated) DEVICE — SENSOR OXIMETER ADULT SPO2 RD SET (20EA/BX)

## (undated) DEVICE — SODIUM CHL IRRIGATION 0.9% 1000ML (12EA/CA)

## (undated) DEVICE — MAT PATIENT POSITIONING PREVALON (10EA/CA)

## (undated) DEVICE — SENSOR SPO2 NEO LNCS ADHESIVE (20/BX) SEE USER NOTES

## (undated) DEVICE — HEAD HOLDER JUNIOR/ADULT

## (undated) DEVICE — SYRINGE DISP. 60 CC LL - (30/BX, 12BX/CA)**WHEN THESE ARE GONE ORDER #500206**

## (undated) DEVICE — SUTURE GENERAL

## (undated) DEVICE — CATHETER IV 20 GA X 1-1/4 ---SURG.& SDS ONLY--- (50EA/BX)

## (undated) DEVICE — MASK AIRWAY SIZE 3 UNIQUE SILICON (10/BX)

## (undated) DEVICE — TOWELS CLOTH SURGICAL - (4/PK 20PK/CA)

## (undated) DEVICE — GOWN SURGICAL X-LARGE ULTRA - FILM-REINFORCED (20/CA)

## (undated) DEVICE — SET IRRIGATION CYSTOSCOPY TUBE L80 IN (20EA/CA)

## (undated) DEVICE — SPONGE GAUZESTER. 2X2 4-PL - (2/PK 50PK/BX 30BX/CS)

## (undated) DEVICE — KIT ANESTHESIA W/CIRCUIT & 3/LT BAG W/FILTER (20EA/CA)

## (undated) DEVICE — LACTATED RINGERS INJ 1000 ML - (14EA/CA 60CA/PF)

## (undated) DEVICE — SHEATH SHARP SITE 30 DEGREE ENDOSCRUB II (5EA/PK)

## (undated) DEVICE — SODIUM CHL. IRRIGATION 0.9% 3000ML (4EA/CA 65CA/PF)

## (undated) DEVICE — TRACKER ENT INSTRUMENT

## (undated) DEVICE — SOD. CHL. INJ. 0.9% 250 ML - (36/CA 50CA/PF)

## (undated) DEVICE — CANISTER SUCTION RIGID RED 1500CC (40EA/CA)

## (undated) DEVICE — BLADE STRAIGHT SINUS (5EA/PK)

## (undated) DEVICE — PEN SKIN MARKER W/RULER - (50EA/BX)

## (undated) DEVICE — PACK ENT OR - (2EA/CA)

## (undated) DEVICE — Device

## (undated) DEVICE — SET LEADWIRE 5 LEAD BEDSIDE DISPOSABLE ECG (1SET OF 5/EA)

## (undated) DEVICE — ADVANCED NOVASURE STERILE DEVICE (3EA/PK)

## (undated) DEVICE — GOWN WARMING STANDARD FLEX - (30/CA)

## (undated) DEVICE — BOVIE FOOT CONTROL SUCTION - 6IN 10FR (25EA/CA)

## (undated) DEVICE — PAD SANITARY 11IN MAXI IND WRAPPED  (12EA/PK 24PK/CA)

## (undated) DEVICE — SET EXTENSION WITH 2 PORTS (48EA/CA) ***PART #2C8610 IS A SUBSTITUTE*****

## (undated) DEVICE — ANTI-FOG SOLUTION - 60BTL/CA

## (undated) DEVICE — DRESSING MASS EYE & EAR SIZE 2 (2EA/PK  50PK/BX  100EA/BX)

## (undated) DEVICE — ELECTRODE DUAL RETURN W/ CORD - (50/PK)

## (undated) DEVICE — GLOVE BIOGEL INDICATOR SZ 7.5 SURGICAL PF LTX - (50PR/BX 4BX/CA)

## (undated) DEVICE — TRACKER ENT PATIENT

## (undated) DEVICE — PATTIES SURG X-RAYCOTTONOID - 1/2 X 3 IN (200/CA)

## (undated) DEVICE — WIPE INSTRUMENT MICROWIPE (20EA/SP)

## (undated) DEVICE — SPECIMEN PLASTIC CONVERTOR - (10/CA)

## (undated) DEVICE — PROTECTOR ULNA NERVE - (36PR/CA)

## (undated) DEVICE — CANNULA W/ SUPPLY TUBING O2 - (50/CA)

## (undated) DEVICE — MASK OXYGEN VNYL ADLT MED CONC WITH 7 FOOT TUBING  - (50EA/CA)